# Patient Record
Sex: MALE | Race: WHITE | NOT HISPANIC OR LATINO | Employment: FULL TIME | URBAN - METROPOLITAN AREA
[De-identification: names, ages, dates, MRNs, and addresses within clinical notes are randomized per-mention and may not be internally consistent; named-entity substitution may affect disease eponyms.]

---

## 2017-02-02 ENCOUNTER — OFFICE VISIT (OUTPATIENT)
Dept: RHEUMATOLOGY | Facility: CLINIC | Age: 62
End: 2017-02-02
Attending: INTERNAL MEDICINE
Payer: COMMERCIAL

## 2017-02-02 VITALS
SYSTOLIC BLOOD PRESSURE: 140 MMHG | BODY MASS INDEX: 23.18 KG/M2 | WEIGHT: 161.9 LBS | TEMPERATURE: 97.5 F | HEART RATE: 75 BPM | DIASTOLIC BLOOD PRESSURE: 90 MMHG | OXYGEN SATURATION: 100 % | HEIGHT: 70 IN

## 2017-02-02 DIAGNOSIS — L40.50 PSORIATIC ARTHRITIS (H): Primary | ICD-10-CM

## 2017-02-02 PROCEDURE — 99212 OFFICE O/P EST SF 10 MIN: CPT | Mod: ZF

## 2017-02-02 ASSESSMENT — PAIN SCALES - GENERAL: PAINLEVEL: NO PAIN (0)

## 2017-02-02 NOTE — NURSING NOTE
Chief Complaint   Patient presents with     RECHECK     Psoriatic Arthritis   Pt roomed, vitals, meds, and allergies reviewed with pt. Pt ready for provider.  Nicholas Moyer, CMA

## 2017-02-02 NOTE — Clinical Note
"2/2/2017      RE: Prince Pereira  [address]  [facility] [address]       Rheumatology Visit     Prince Pereira MRN# [id number]   YOB: 1955 Age: 61 year old     Date of Visit: February 2, 2017  Primary care provider: Brendan Ndiaye          Assessment and Plan:   1. 60 yo male with 16+ year hx of Psoriatic Arthritis, well controlled on Enbrel. He did notice mild return of symptoms after about 7-8 weeks off drug in 2015 due to inguinal surgery complication. He is close to a clinical remission taking it less frequently often every 10 days.  2. Atrial Fibrillation, post ablation  3. Hx of Herpes Zoster, resolved.  4. Prior R temporal HA/facial pain, with negative evaluation including TA bx. This is resolved.  PLAN: discussed in detail with the patient  1. See lab orders, medication orders and follow-up plans for this encounter.   2. Additional treatment plan consists of continue Enbrel, renewed for one year  3. We discussed potential side effects   4. RTC: in 1 year(s)   5. Letter: no - he is going to see his PCP this spring  Tai Sanon MD            History of Present Illness:    60 yo  male with a past medical history of psoriatic arthritis and atrial fibrillation. He is here today for followup. I saw him for the first time in June 2012 and last in February 2016. EPIC Reviewed.  HISTORY CARRIED FORWARD:  To review, he was diagnosed with psoriatic arthritis 14+ years ago, and it has been well controlled with Enbrel  He manages to stretch his injections to once every 8-10 days, and he notes that the development of canker sores are reproducibly an indication to take his medication. He has not noticed any inflammation or irritation at the injection site. He is not experiencing any active inflammation or pain. The patient states that his range of motion is \"almost back to normal,\" with his continued use of Enbrel. He has some intermittent shoulder pain on " R that is rotator cuff related, exacerbated by tennis.  Additionally, the patient has a past medical history of atrial fibrillation since 1988, that required recent cardioversion in February; he had a second episode days later that resolved with Flecanide. He was given flecainide to use if his symptoms return, but has not needed to use the medication to date. The patient currently takes metoprolol XL and aspirin for his atrial fibrillation. He also occasionally experiences reflux, and this has been managed with omeprazole once a day.   Overall, the patient has been quite pleased with his medication, and plans to continue using Enbrel to manage his psoriatic arthritis. He has had only a small patch of psoriasis on L shin now resolved almost with Triamcinolone.  He had a bout of herpes zoster in February 2013 that resolved. He had Valtrex and has no post herpetic pain. We discussed in the past the lack of consensus about risk vs. Benefit of Zostavax in patients on anti TNF therapy. He had no questions or concerns.   He saw Nina Smith late 2014 and then Dr. Pandey emergently in late December for question of possible GCA. CRP and ESR were normal. He did have a TA bx by Dr. Ramon Lagunas which was negative. His symptoms  gradually abated and were absent a year ago.  He had a hernia repair in 2015 with pre op physical by Dr. Ndiaye, and interval followup by Dr. Hawthorne regarding prior stable pulmonary nodules and does not require followup.  Unfortunately he had a post op hematoma after surgery that opened and had to heal by secondary intention.  He was off Enbrel for about 8 weeks due to this. He did note right at the end of that slight return of hand stiffness and pain and slightly more psoriasis. This came back under excellent control after resuming and he is now close to asymptomatic taking the Enbrel on average every 10-14 days.  INTERVAL HISTORY:      Happily he has had an uneventful year.  No interval health  problems. No infections.     He has remained on long term Enbrel >14 years without problems. He often stretches it out to 10 days or so but after that will note some joint aching and mouth soreness.  No injection issues.    His mechanical foot issues are stable.    No recurrent of A Fib.     He is happy to continue on Enbrel.        Review of Systems:   Review Of Systems  Skin: see HPI  Eyes: negative  Ears/Nose/Throat: negative  Respiratory: No shortness of breath, dyspnea on exertion, cough, or hemoptysis  Cardiovascular: negative  Gastrointestinal: negative  Genitourinary: negative  Musculoskeletal: see HPI  Neurologic: negative  Psychiatric: negative  Hematologic/Lymphatic/Immunologic: negative  Endocrine: negative          Past Medical History:     Past Medical History   Diagnosis Date     psoriatic arthritis      Psoriasis      Atrial fibrillation (H)      paroxysmal with ablation 2014     Family history of prostate cancer      father  age 60     Pulmonary nodules 2014     multiple noncalcified up to 6 mm needs f/u     Herpes zoster 3/26/2013     Almendarez's neuroma of right foot      Arrhythmia        Patient Active Problem List    Diagnosis Date Noted     Herpes zoster      Priority: Medium     S/P ablation of atrial fibrillation 2014     Priority: Medium     Pulmonary nodules 2014     Priority: Medium     multiple noncalcified up to 6 mm needs f/u       Psoriatic arthritis (H) 2012     Priority: Medium     Atrial fibrillation (H) 1987     Priority: Medium             Past Surgical History:     Past Surgical History   Procedure Laterality Date     Anesthesia cardioversion  10/6/2011     Procedure:ANESTHESIA CARDIOVERSION; CARDIOVERSION; Surgeon:GENERIC ANESTHESIA PROVIDER; [facility]     Ent surgery       tonsillectomy     Anesthesia cardioversion  2/10/2012     Procedure:ANESTHESIA CARDIOVERSION; CARDIOVERSION; Surgeon:GENERIC ANESTHESIA PROVIDER; [facility]      Anesthesia cardioversion  2013     Procedure: ANESTHESIA CARDIOVERSION;  Cardioversion;  Surgeon: Provider, Generic Anesthesia;  [facility]     Anesthesia cardioversion  2013     Procedure: ANESTHESIA CARDIOVERSION;  Cardioversion;  Surgeon: Provider, Generic Anesthesia;  [facility]     Colonoscopy       Left atrial wide area circumferential radiofrequency ablation  2014       for atrial fib     Cataract iol, rt/lt Left 2015     Phacoemulsification clear cornea with standard intraocular lens implant Right 2015     Procedure: PHACOEMULSIFICATION CLEAR CORNEA WITH STANDARD INTRAOCULAR LENS IMPLANT;  Surgeon: Petr Moyer MD;  [facility]     Herniorrhaphy inguinal Right 7/15/2015     Procedure: HERNIORRHAPHY INGUINAL;  Surgeon: Bk Watts MD;  [facility]             Social History:     Social History   Substance Use Topics     Smoking status: Former Smoker -- 1 years     Types: Cigarettes     Smokeless tobacco: Never Used      Comment: 5-10 cips per day smoke only for a year     Alcohol Use: 5.0 oz/week     10 Standard drinks or equivalent per week      Comment: occasional             Family History:     Family History   Problem Relation Age of Onset     C.A.D. Maternal Grandfather       age 63     Hypertension Maternal Grandfather      Hypertension Mother      Prostate Cancer Father       age 60 agressive prostate ca     Glaucoma No family hx of      Macular Degeneration No family hx of      Alzheimer Disease Mother      Alzheimer Disease Maternal Grandmother             Allergies:     Allergies   Allergen Reactions     No Clinical Screening - See Comments Rash     Cardioversion pads cause rash.             Medications:     Current Outpatient Prescriptions   Medication Sig Dispense Refill     etanercept (ENBREL) 50 MG/ML SOSY prefilled syringe Inject 0.98 mLs (50 mg) Subcutaneous once a week Prefilled syringeHold for signs of infection, and  "then seek medical attention. 4 Syringe 11            Physical Exam:   /90 mmHg  Pulse 75  Temp(Src) 97.5  F (36.4  C) (Oral)  Ht 1.778 m (5' 10\")  Wt 73.437 kg (161 lb 14.4 oz)  BMI 23.23 kg/m2  SpO2 100%  Constitutional: WD-WN-WG cooperative   Eyes: nl conjunctiva, sclera   ENT: nl external ears, nose, throat   No mucous membrane lesions, normal saliva pool   Neck: no mass or thyroid enlargement    Lymph: no cervical, supraclavicular, inguinal or epitrochlear nodes   MS: All TMJ, neck, shoulder, elbow, wrist, MCP/PIP/DIP, spine, hip, knee, ankle, and foot MTP/IP joints were examined and found normal.. No active synovitis or deformity. Full ROM. Normal  strength   Skin: no nail pitting, alopecia, rash, nodules. He has a few patches of psoriasis on R shin  Neuro: nl cranial nerves, strength   Psych: nl affect.       Data:     Lab Results   Component Value Date    WBC 4.5 07/13/2015    WBC 6.4 11/18/2014    WBC 5.3 08/01/2014    HGB 13.5 07/13/2015    HGB 13.8 11/18/2014    HGB 13.2* 08/01/2014    HCT 39.2* 07/13/2015    HCT 40.2 11/18/2014    HCT 40.4 08/01/2014    MCV 86 07/13/2015    MCV 87 11/18/2014    MCV 88 08/01/2014     07/13/2015     11/18/2014     08/01/2014     Lab Results   Component Value Date    BUN 14 07/13/2015    BUN 12 08/01/2014    BUN 14 02/13/2014     No components found for: SEDRATE  Lab Results   Component Value Date    TSH 2.35 10/06/2011     Lab Results   Component Value Date    AST 26 08/01/2014    AST 26 11/06/2013    AST 24 04/11/2012    ALT 31 08/01/2014    ALT 30 11/06/2013    ALT 18 04/11/2012    ALKPHOS 77 08/01/2014    ALKPHOS 62 11/06/2013    ALKPHOS 59 04/11/2012     Reviewed Rheumatology lab flowsheet    Tai Sanon MD    "

## 2017-02-02 NOTE — PROGRESS NOTES
"Rheumatology Visit     Prince Pereira MRN# [id number]   YOB: 1955 Age: 61 year old     Date of Visit: February 2, 2017  Primary care provider: Brendan Ndiaye          Assessment and Plan:   1. 60 yo male with 16+ year hx of Psoriatic Arthritis, well controlled on Enbrel. He did notice mild return of symptoms after about 7-8 weeks off drug in 2015 due to inguinal surgery complication. He is close to a clinical remission taking it less frequently often every 10 days.  2. Atrial Fibrillation, post ablation  3. Hx of Herpes Zoster, resolved.  4. Prior R temporal HA/facial pain, with negative evaluation including TA bx. This is resolved.  PLAN: discussed in detail with the patient  1. See lab orders, medication orders and follow-up plans for this encounter.   2. Additional treatment plan consists of continue Enbrel, renewed for one year  3. We discussed potential side effects   4. RTC: in 1 year(s)   5. Letter: no - he is going to see his PCP this spring  Tai Sanon MD            History of Present Illness:    60 yo  male with a past medical history of psoriatic arthritis and atrial fibrillation. He is here today for followup. I saw him for the first time in June 2012 and last in February 2016. EPIC Reviewed.  HISTORY CARRIED FORWARD:  To review, he was diagnosed with psoriatic arthritis 14+ years ago, and it has been well controlled with Enbrel  He manages to stretch his injections to once every 8-10 days, and he notes that the development of canker sores are reproducibly an indication to take his medication. He has not noticed any inflammation or irritation at the injection site. He is not experiencing any active inflammation or pain. The patient states that his range of motion is \"almost back to normal,\" with his continued use of Enbrel. He has some intermittent shoulder pain on R that is rotator cuff related, exacerbated by tennis.  Additionally, the patient has a past " medical history of atrial fibrillation since 1988, that required recent cardioversion in February; he had a second episode days later that resolved with Flecanide. He was given flecainide to use if his symptoms return, but has not needed to use the medication to date. The patient currently takes metoprolol XL and aspirin for his atrial fibrillation. He also occasionally experiences reflux, and this has been managed with omeprazole once a day.   Overall, the patient has been quite pleased with his medication, and plans to continue using Enbrel to manage his psoriatic arthritis. He has had only a small patch of psoriasis on L shin now resolved almost with Triamcinolone.  He had a bout of herpes zoster in February 2013 that resolved. He had Valtrex and has no post herpetic pain. We discussed in the past the lack of consensus about risk vs. Benefit of Zostavax in patients on anti TNF therapy. He had no questions or concerns.   He saw Nina Smith late 2014 and then Dr. Pandey emergently in late December for question of possible GCA. CRP and ESR were normal. He did have a TA bx by Dr. Ramon Lagunas which was negative. His symptoms  gradually abated and were absent a year ago.  He had a hernia repair in 2015 with pre op physical by Dr. Ndiaye, and interval followup by Dr. Hawthorne regarding prior stable pulmonary nodules and does not require followup.  Unfortunately he had a post op hematoma after surgery that opened and had to heal by secondary intention.  He was off Enbrel for about 8 weeks due to this. He did note right at the end of that slight return of hand stiffness and pain and slightly more psoriasis. This came back under excellent control after resuming and he is now close to asymptomatic taking the Enbrel on average every 10-14 days.  INTERVAL HISTORY:      Happily he has had an uneventful year.  No interval health problems. No infections.     He has remained on long term Enbrel >14 years without  problems. He often stretches it out to 10 days or so but after that will note some joint aching and mouth soreness.  No injection issues.    His mechanical foot issues are stable.    No recurrent of A Fib.     He is happy to continue on Enbrel.        Review of Systems:   Review Of Systems  Skin: see HPI  Eyes: negative  Ears/Nose/Throat: negative  Respiratory: No shortness of breath, dyspnea on exertion, cough, or hemoptysis  Cardiovascular: negative  Gastrointestinal: negative  Genitourinary: negative  Musculoskeletal: see HPI  Neurologic: negative  Psychiatric: negative  Hematologic/Lymphatic/Immunologic: negative  Endocrine: negative          Past Medical History:     Past Medical History   Diagnosis Date     psoriatic arthritis      Psoriasis      Atrial fibrillation (H)      paroxysmal with ablation 2014     Family history of prostate cancer      father  age 60     Pulmonary nodules 2014     multiple noncalcified up to 6 mm needs f/u     Herpes zoster 3/26/2013     Almendarez's neuroma of right foot      Arrhythmia        Patient Active Problem List    Diagnosis Date Noted     Herpes zoster      Priority: Medium     S/P ablation of atrial fibrillation 2014     Priority: Medium     Pulmonary nodules 2014     Priority: Medium     multiple noncalcified up to 6 mm needs f/u       Psoriatic arthritis (H) 2012     Priority: Medium     Atrial fibrillation (H) 1987     Priority: Medium             Past Surgical History:     Past Surgical History   Procedure Laterality Date     Anesthesia cardioversion  10/6/2011     Procedure:ANESTHESIA CARDIOVERSION; CARDIOVERSION; Surgeon:GENERIC ANESTHESIA PROVIDER; [facility]     Ent surgery       tonsillectomy     Anesthesia cardioversion  2/10/2012     Procedure:ANESTHESIA CARDIOVERSION; CARDIOVERSION; Surgeon:GENERIC ANESTHESIA PROVIDER; [facility]     Anesthesia cardioversion  2013     Procedure: ANESTHESIA CARDIOVERSION;  Cardioversion;   Surgeon: Provider, Generic Anesthesia;  [facility]     Anesthesia cardioversion  2013     Procedure: ANESTHESIA CARDIOVERSION;  Cardioversion;  Surgeon: Provider, Generic Anesthesia;  [facility]     Colonoscopy       Left atrial wide area circumferential radiofrequency ablation  2014       for atrial fib     Cataract iol, rt/lt Left 2015     Phacoemulsification clear cornea with standard intraocular lens implant Right 2015     Procedure: PHACOEMULSIFICATION CLEAR CORNEA WITH STANDARD INTRAOCULAR LENS IMPLANT;  Surgeon: Petr Moyer MD;  [facility]     Herniorrhaphy inguinal Right 7/15/2015     Procedure: HERNIORRHAPHY INGUINAL;  Surgeon: Bk Watts MD;  [facility]             Social History:     Social History   Substance Use Topics     Smoking status: Former Smoker -- 1 years     Types: Cigarettes     Smokeless tobacco: Never Used      Comment: 5-10 cips per day smoke only for a year     Alcohol Use: 5.0 oz/week     10 Standard drinks or equivalent per week      Comment: occasional             Family History:     Family History   Problem Relation Age of Onset     C.A.D. Maternal Grandfather       age 63     Hypertension Maternal Grandfather      Hypertension Mother      Prostate Cancer Father       age 60 agressive prostate ca     Glaucoma No family hx of      Macular Degeneration No family hx of      Alzheimer Disease Mother      Alzheimer Disease Maternal Grandmother             Allergies:     Allergies   Allergen Reactions     No Clinical Screening - See Comments Rash     Cardioversion pads cause rash.             Medications:     Current Outpatient Prescriptions   Medication Sig Dispense Refill     etanercept (ENBREL) 50 MG/ML SOSY prefilled syringe Inject 0.98 mLs (50 mg) Subcutaneous once a week Prefilled syringeHold for signs of infection, and then seek medical attention. 4 Syringe 11            Physical Exam:   /90 mmHg   "Pulse 75  Temp(Src) 97.5  F (36.4  C) (Oral)  Ht 1.778 m (5' 10\")  Wt 73.437 kg (161 lb 14.4 oz)  BMI 23.23 kg/m2  SpO2 100%  Constitutional: WD-WN-WG cooperative   Eyes: nl conjunctiva, sclera   ENT: nl external ears, nose, throat   No mucous membrane lesions, normal saliva pool   Neck: no mass or thyroid enlargement    Lymph: no cervical, supraclavicular, inguinal or epitrochlear nodes   MS: All TMJ, neck, shoulder, elbow, wrist, MCP/PIP/DIP, spine, hip, knee, ankle, and foot MTP/IP joints were examined and found normal.. No active synovitis or deformity. Full ROM. Normal  strength   Skin: no nail pitting, alopecia, rash, nodules. He has a few patches of psoriasis on R shin  Neuro: nl cranial nerves, strength   Psych: nl affect.       Data:     Lab Results   Component Value Date    WBC 4.5 07/13/2015    WBC 6.4 11/18/2014    WBC 5.3 08/01/2014    HGB 13.5 07/13/2015    HGB 13.8 11/18/2014    HGB 13.2* 08/01/2014    HCT 39.2* 07/13/2015    HCT 40.2 11/18/2014    HCT 40.4 08/01/2014    MCV 86 07/13/2015    MCV 87 11/18/2014    MCV 88 08/01/2014     07/13/2015     11/18/2014     08/01/2014     Lab Results   Component Value Date    BUN 14 07/13/2015    BUN 12 08/01/2014    BUN 14 02/13/2014     No components found for: SEDRATE  Lab Results   Component Value Date    TSH 2.35 10/06/2011     Lab Results   Component Value Date    AST 26 08/01/2014    AST 26 11/06/2013    AST 24 04/11/2012    ALT 31 08/01/2014    ALT 30 11/06/2013    ALT 18 04/11/2012    ALKPHOS 77 08/01/2014    ALKPHOS 62 11/06/2013    ALKPHOS 59 04/11/2012     Reviewed Rheumatology lab flowsheet    Tai Sanon MD    "

## 2017-05-08 ENCOUNTER — RADIANT APPOINTMENT (OUTPATIENT)
Dept: GENERAL RADIOLOGY | Facility: CLINIC | Age: 62
End: 2017-05-08
Attending: PHYSICIAN ASSISTANT
Payer: COMMERCIAL

## 2017-05-08 ENCOUNTER — OFFICE VISIT (OUTPATIENT)
Dept: URGENT CARE | Facility: URGENT CARE | Age: 62
End: 2017-05-08
Payer: COMMERCIAL

## 2017-05-08 VITALS
DIASTOLIC BLOOD PRESSURE: 78 MMHG | WEIGHT: 160 LBS | TEMPERATURE: 97.7 F | BODY MASS INDEX: 22.96 KG/M2 | HEART RATE: 67 BPM | SYSTOLIC BLOOD PRESSURE: 140 MMHG | OXYGEN SATURATION: 99 %

## 2017-05-08 DIAGNOSIS — M54.6 ACUTE RIGHT-SIDED THORACIC BACK PAIN: ICD-10-CM
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
## 2017-05-08 DIAGNOSIS — M62.830 BACK MUSCLE SPASM: ICD-10-CM

## 2017-05-08 PROCEDURE — 71101 X-RAY EXAM UNILAT RIBS/CHEST: CPT | Mod: RT

## 2017-05-08 PROCEDURE — 99214 OFFICE O/P EST MOD 30 MIN: CPT | Performed by: PHYSICIAN ASSISTANT

## 2017-05-08 RX ORDER — HYDROCODONE BITARTRATE AND ACETAMINOPHEN 5; 325 MG/1; MG/1
1-2 TABLET ORAL EVERY 6 HOURS PRN
Qty: 20 TABLET | Refills: 0 | Status: SHIPPED | OUTPATIENT
Start: 2017-05-08 | End: 2018-01-31

## 2017-05-08 RX ORDER — METHOCARBAMOL 750 MG/1
750 TABLET, FILM COATED ORAL 4 TIMES DAILY PRN
Qty: 40 TABLET | Refills: 0 | Status: SHIPPED | OUTPATIENT
Start: 2017-05-08 | End: 2018-01-31

## 2017-05-08 NOTE — MR AVS SNAPSHOT
After Visit Summary   5/8/2017    Prince Pereira    MRN: [id number]           Patient Information     Date Of Birth          1955        Visit Information        Provider Department      5/8/2017 9:40 AM Greg Wing PA-C [facility]        Today's Diagnoses     Fall, initial encounter    -  1    Acute right-sided thoracic back pain        Back muscle spasm           Follow-ups after your visit        Your next 10 appointments already scheduled     Jan 31, 2018  7:00 AM CST   (Arrive by 6:45 AM)   Return Visit with Tai Sanon MD   [facility] ([facility])    [address]  3rd Floor  [place] [address] [phone]              Who to contact     If you have questions or need follow up information about today's clinic visit or your schedule please contact [facility] directly at [phone].  Normal or non-critical lab and imaging results will be communicated to you by MyChart, letter or phone within 4 business days after the clinic has received the results. If you do not hear from us within 7 days, please contact the clinic through [facility] or phone. If you have a critical or abnormal lab result, we will notify you by phone as soon as possible.  Submit refill requests through [employer] or call your pharmacy and they will forward the refill request to us. Please allow 3 business days for your refill to be completed.          Additional Information About Your Visit        MyChart Information     [employer] gives you secure access to your electronic health record. If you see a primary care provider, you can also send messages to your care team and make appointments. If you have questions, please call your primary care clinic.  If you do not have a primary care provider, please call [phone] and they will assist you.        Care EveryWhere ID     This is your Care EveryWhere  ID. This could be used by other organizations to access your [facility] medical records  [id number]        Your Vitals Were     Pulse Temperature Pulse Oximetry BMI (Body Mass Index)          67 97.7  F (36.5  C) 99% 22.96 kg/m2         Blood Pressure from Last 3 Encounters:   05/08/17 140/78   02/02/17 140/90   04/08/16 130/84    Weight from Last 3 Encounters:   05/08/17 160 lb (72.6 kg)   02/02/17 161 lb 14.4 oz (73.4 kg)   04/08/16 161 lb 4.8 oz (73.2 kg)                 Today's Medication Changes          These changes are accurate as of: 5/8/17 11:04 AM.  If you have any questions, ask your nurse or doctor.               Start taking these medicines.        Dose/Directions    HYDROcodone-acetaminophen 5-325 MG per tablet   Commonly known as:  NORCO   Used for:  Acute right-sided thoracic back pain   Started by:  Greg Wing PA-C        Dose:  1-2 tablet   Take 1-2 tablets by mouth every 6 hours as needed for moderate to severe pain   Quantity:  20 tablet   Refills:  0       methocarbamol 750 MG tablet   Commonly known as:  ROBAXIN   Used for:  Back muscle spasm   Started by:  Greg Wing PA-C        Dose:  750 mg   Take 1 tablet (750 mg) by mouth 4 times daily as needed for muscle spasms   Quantity:  40 tablet   Refills:  0            Where to get your medicines      Some of these will need a paper prescription and others can be bought over the counter.  Ask your nurse if you have questions.     Bring a paper prescription for each of these medications     HYDROcodone-acetaminophen 5-325 MG per tablet    methocarbamol 750 MG tablet                Primary Care Provider Office Phone # Fax #    Brendan Ndiaye [phone]        [facility] [address]  [facility] [address]        Thank you!     Thank you for choosing [facility]  for your care. Our goal is always to provide you with excellent care. Hearing back from our patients is one  way we can continue to improve our services. Please take a few minutes to complete the written survey that you may receive in the mail after your visit with us. Thank you!             Your Updated Medication List - Protect others around you: Learn how to safely use, store and throw away your medicines at www.disposemymeds.org.          This list is accurate as of: 5/8/17 11:04 AM.  Always use your most recent med list.                   Brand Name Dispense Instructions for use    etanercept 50 MG/ML injection    ENBREL    4 Syringe    Inject 0.98 mLs (50 mg) Subcutaneous once a week Prefilled syringeHold for signs of infection, and then seek medical attention.       HYDROcodone-acetaminophen 5-325 MG per tablet    NORCO    20 tablet    Take 1-2 tablets by mouth every 6 hours as needed for moderate to severe pain       methocarbamol 750 MG tablet    ROBAXIN    40 tablet    Take 1 tablet (750 mg) by mouth 4 times daily as needed for muscle spasms       NAPROXEN PO      Take by mouth as needed for moderate pain

## 2017-05-08 NOTE — PROGRESS NOTES
SUBJECTIVE:  Chief Complaint   Patient presents with     Back Pain     Pt states that he was fishing yesterday and fell backwards in his boat injuring his mid/lower back and right side.     Urgent Care     Prince Pereira is a 61 year old male presents with a chief complaint of right flank tenderness, pain and spasms .  The injury occurred 1 day(s) ago.   The injury happened while at home. How: working in boat.  The patient complained of moderate pain  and has had decreased ROM.  Pain exacerbated by movement.  Relieved by rest and ice.  He treated it initially with ice. This is the first time this type of injury has occurred to this patient.     Past Medical History:   Diagnosis Date     Arrhythmia      Atrial fibrillation (H)     paroxysmal with ablation 2014     Family history of prostate cancer     father  age 60     Herpes zoster 3/26/2013     Almendarez's neuroma of right foot      Psoriasis      psoriatic arthritis      Pulmonary nodules 2014    multiple noncalcified up to 6 mm needs f/u     Allergies   Allergen Reactions     No Clinical Screening - See Comments Rash     Cardioversion pads cause rash.     Social History   Substance Use Topics     Smoking status: Former Smoker     Years: 1.00     Types: Cigarettes     Quit date: 1980     Smokeless tobacco: Never Used     Alcohol use 5.0 oz/week     10 Standard drinks or equivalent per week      Comment: occasional       ROS:  CONSTITUTIONAL:NEGATIVE for fever, chills, change in weight  INTEGUMENTARY/SKIN: NEGATIVE for worrisome rashes, moles or lesions  ENT/MOUTH: NEGATIVE for ear, mouth and throat problems  RESP:NEGATIVE for significant cough or SOB  CV: NEGATIVE for chest pain, palpitations or peripheral edema  MUSCULOSKELETAL: POSITIVE  for right flank tenderness, pain, spasms  NEURO: NEGATIVE for weakness, dizziness or paresthesias    EXAM:   /78  Pulse 67  Temp 97.7  F (36.5  C)  Wt 160 lb (72.6 kg)  SpO2 99%  BMI 22.96  kg/m2  Gen: healthy,alert,no distress  Extremity: Full ROM of upper extremities bilaterally.   There is not compromise to the distal circulation.  Pulses are +2 and CRT is brisk  GENERAL APPEARANCE: healthy, alert and no distress  EXTREMITIES: peripheral pulses normal  MS:  Positive for right flank tenderness, pain, spasms  SKIN: no suspicious lesions or rashes  NEURO: Normal strength and tone, sensory exam grossly normal, mentation intact and speech normal    X-RAY was done and negative for acute changes including fracture Xray read by Greg Wing at time of visit    ASSESSMENT/PLAN:      ICD-10-CM    1. Fall, initial encounter W19.XXXA    2. Acute right-sided thoracic back pain M54.6 XR Ribs & Chest Right G/E 3 Views     HYDROcodone-acetaminophen (NORCO) 5-325 MG per tablet   3. Back muscle spasm M62.830 methocarbamol (ROBAXIN) 750 MG tablet       Ice compresses  ROM exercises  Follow up as needed

## 2017-05-08 NOTE — NURSING NOTE
"Chief Complaint   Patient presents with     Back Pain     Pt states that he was fishing yesterday and fell backwards in his boat injuring his mid/lower back and right side.     Urgent Care       Initial /78  Pulse 67  Temp 97.7  F (36.5  C)  Wt 160 lb (72.6 kg)  SpO2 99%  BMI 22.96 kg/m2 Estimated body mass index is 22.96 kg/(m^2) as calculated from the following:    Height as of 2/2/17: 5' 10\" (1.778 m).    Weight as of this encounter: 160 lb (72.6 kg).  Medication Reconciliation: complete    "

## 2017-05-11 ENCOUNTER — MEDICAL CORRESPONDENCE (OUTPATIENT)
Dept: HEALTH INFORMATION MANAGEMENT | Facility: CLINIC | Age: 62
End: 2017-05-11

## 2017-05-11 ENCOUNTER — TRANSFERRED RECORDS (OUTPATIENT)
Dept: HEALTH INFORMATION MANAGEMENT | Facility: CLINIC | Age: 62
End: 2017-05-11

## 2017-05-11 ENCOUNTER — TELEPHONE (OUTPATIENT)
Dept: CARDIOLOGY | Facility: CLINIC | Age: 62
End: 2017-05-11

## 2017-05-11 NOTE — TELEPHONE ENCOUNTER
Called and spoke to Dr Chappell. She has scheduled the patient with Dr Haynes for next week and does not need to speak with Dr Salcido.

## 2017-05-11 NOTE — TELEPHONE ENCOUNTER
Dr. Kortney Chappell would like to discuss patient care with Dr. Salcido. Please call back with pager #. [phone]

## 2017-05-12 ENCOUNTER — MEDICAL CORRESPONDENCE (OUTPATIENT)
Dept: HEALTH INFORMATION MANAGEMENT | Facility: CLINIC | Age: 62
End: 2017-05-12

## 2017-05-15 ENCOUNTER — MYC MEDICAL ADVICE (OUTPATIENT)
Dept: CARDIOLOGY | Facility: CLINIC | Age: 62
End: 2017-05-15

## 2017-05-15 ENCOUNTER — PRE VISIT (OUTPATIENT)
Dept: CARDIOLOGY | Facility: CLINIC | Age: 62
End: 2017-05-15

## 2017-05-15 ENCOUNTER — OFFICE VISIT (OUTPATIENT)
Dept: CARDIOLOGY | Facility: CLINIC | Age: 62
End: 2017-05-15
Attending: NURSE PRACTITIONER
Payer: COMMERCIAL

## 2017-05-15 ENCOUNTER — OFFICE VISIT (OUTPATIENT)
Dept: INTERNAL MEDICINE | Facility: CLINIC | Age: 62
End: 2017-05-15

## 2017-05-15 VITALS
WEIGHT: 160.6 LBS | HEART RATE: 72 BPM | OXYGEN SATURATION: 100 % | DIASTOLIC BLOOD PRESSURE: 89 MMHG | SYSTOLIC BLOOD PRESSURE: 137 MMHG | HEIGHT: 70 IN | BODY MASS INDEX: 22.99 KG/M2

## 2017-05-15 DIAGNOSIS — M54.50 ACUTE LOW BACK PAIN DUE TO TRAUMA: Primary | ICD-10-CM

## 2017-05-15 DIAGNOSIS — G89.11 ACUTE LOW BACK PAIN DUE TO TRAUMA: Primary | ICD-10-CM

## 2017-05-15 DIAGNOSIS — I48.91 ATRIAL FIBRILLATION, UNSPECIFIED TYPE (H): ICD-10-CM

## 2017-05-15 DIAGNOSIS — I48.91 ATRIAL FIBRILLATION, UNSPECIFIED TYPE (H): Primary | ICD-10-CM

## 2017-05-15 LAB — INTERPRETATION ECG - MUSE: NORMAL

## 2017-05-15 PROCEDURE — 93005 ELECTROCARDIOGRAM TRACING: CPT | Mod: ZF

## 2017-05-15 PROCEDURE — 93010 ELECTROCARDIOGRAM REPORT: CPT | Mod: ZP | Performed by: INTERNAL MEDICINE

## 2017-05-15 PROCEDURE — 99213 OFFICE O/P EST LOW 20 MIN: CPT | Mod: 25 | Performed by: NURSE PRACTITIONER

## 2017-05-15 RX ORDER — GABAPENTIN 300 MG/1
300 CAPSULE ORAL 3 TIMES DAILY
Qty: 90 CAPSULE | Refills: 3 | Status: SHIPPED | OUTPATIENT
Start: 2017-05-15 | End: 2018-01-31

## 2017-05-15 RX ORDER — METOPROLOL SUCCINATE 25 MG/1
25 TABLET, EXTENDED RELEASE ORAL DAILY
COMMUNITY
End: 2018-01-31

## 2017-05-15 RX ORDER — FLECAINIDE ACETATE 100 MG/1
100 TABLET ORAL 2 TIMES DAILY
Qty: 6 TABLET | Refills: 0 | Status: SHIPPED | OUTPATIENT
Start: 2017-05-15 | End: 2021-01-14

## 2017-05-15 RX ORDER — DABIGATRAN ETEXILATE 150 MG/1
150 CAPSULE ORAL 2 TIMES DAILY
Qty: 60 CAPSULE | Refills: 0 | Status: SHIPPED | OUTPATIENT
Start: 2017-05-15 | End: 2018-01-31

## 2017-05-15 RX ORDER — METHYLPREDNISOLONE 4 MG
TABLET, DOSE PACK ORAL
Qty: 21 TABLET | Refills: 0 | Status: SHIPPED | OUTPATIENT
Start: 2017-05-15 | End: 2018-01-31

## 2017-05-15 ASSESSMENT — PAIN SCALES - GENERAL
PAINLEVEL: EXTREME PAIN (8)
PAINLEVEL: NO PAIN (0)

## 2017-05-15 NOTE — PROGRESS NOTES
Clinical Cardiac Electrophysiology    Chief Complaint: Atrial fibrillation     HPI: Prince Pereira is a 61 year old male.  He is a psychiatrist.  His past medical history includes PVI for atrial fibrillation 2/2014, pulmonary nodules (followed by pulmonary nodule clinic), GERD, psoriasis and psoriasis arthritis.  He has had paroxsymal atrial fibrillation for the past 25 years with 10+ DCCV and a recent PVI on 2/2014 which was complicated with a 8 day stay in the hospital for femoral hematoma.  Prior to his PVI he was  taking flecainide as a pill in a pocket approach.  He was referred  from [facility] for recent episode of atrial fibrillation.      Today he presents stating he recently experienced significant back pain after falling backwards.  He has been taking methocarbamol since 5/8 and had 1 hydrocodone-acetaminophen.  Woke up one 3 days ago feeling extreme fatigue, palpitations, and nausea.  He checked his pulse and noted to be irregular; he proceeded to [facility].  His labs were checked and he was subsequently prescribed metoprolol XL 25 mg of which is he is tolerating.  He self converted in the clinic.  He states he has not had any atrial fibrillation episodes since having a PVI in 2/2014.  Prior to his recent back injury he was exercising regularly. Denies headaches, dizziness, syncope, angina, dyspnea at rest or with exertion, palpitations, orthopnea, PND, abdominal pain, pedal edema, claudication, or any new numbness/weakness, hematuria, hematochezia, and epistaxis.   In the near future he is planning on traveling to [facility] and on various family trips.    Today's EKG sinus rhythm with ventricular rate of 70 bpm.     Current Outpatient Prescriptions   Medication Sig Dispense Refill     NAPROXEN PO Take by mouth as needed for moderate pain       methocarbamol (ROBAXIN) 750 MG tablet Take 1 tablet (750 mg) by mouth 4 times daily as needed for muscle spasms 40 tablet 0      HYDROcodone-acetaminophen (NORCO) 5-325 MG per tablet Take 1-2 tablets by mouth every 6 hours as needed for moderate to severe pain 20 tablet 0     etanercept (ENBREL) 50 MG/ML injection Inject 0.98 mLs (50 mg) Subcutaneous once a week Prefilled syringeHold for signs of infection, and then seek medical attention. 4 Syringe 11       Past Medical History:   Diagnosis Date     Arrhythmia      Atrial fibrillation (H)     paroxysmal with ablation 2014     Family history of prostate cancer     father  age 60     Herpes zoster 3/26/2013     Almendarez's neuroma of right foot      Psoriasis      psoriatic arthritis      Pulmonary nodules 2014    multiple noncalcified up to 6 mm needs f/u       Past Surgical History:   Procedure Laterality Date     ANESTHESIA CARDIOVERSION  10/6/2011    Procedure:ANESTHESIA CARDIOVERSION; CARDIOVERSION; Surgeon:GENERIC ANESTHESIA PROVIDER; Location:[facility] OR     ANESTHESIA CARDIOVERSION  2/10/2012    Procedure:ANESTHESIA CARDIOVERSION; CARDIOVERSION; Surgeon:GENERIC ANESTHESIA PROVIDER; Location[facility]     ANESTHESIA CARDIOVERSION  2013    Procedure: ANESTHESIA CARDIOVERSION;  Cardioversion;  Surgeon: Provider, Generic Anesthesia;  Location: [facility] OR     ANESTHESIA CARDIOVERSION  2013    Procedure: ANESTHESIA CARDIOVERSION;  Cardioversion;  Surgeon: Provider, Generic Anesthesia;  Location: [facility] OR     CATARACT IOL, RT/LT Left 2015     COLONOSCOPY       ENT SURGERY      tonsillectomy     HERNIORRHAPHY INGUINAL Right 7/15/2015    Procedure: HERNIORRHAPHY INGUINAL;  Surgeon: Bk Watts MD;  Location: [facility] OR     Left Atrial Wide Area Circumferential radiofrequency ablation  2014      for atrial fib     PHACOEMULSIFICATION CLEAR CORNEA WITH STANDARD INTRAOCULAR LENS IMPLANT Right 2015    Procedure: PHACOEMULSIFICATION CLEAR CORNEA WITH STANDARD INTRAOCULAR LENS IMPLANT;  Surgeon: Petr Moyer MD;  Location: [facility]       Family History   Problem Relation  "Age of Onset     C.A.D. Maternal Grandfather       age 63     Hypertension Maternal Grandfather      Hypertension Mother      Alzheimer Disease Mother      Prostate Cancer Father       age 60 agressive prostate ca     Alzheimer Disease Maternal Grandmother      Glaucoma No family hx of      Macular Degeneration No family hx of        Social History   Substance Use Topics     Smoking status: Former Smoker     Years: 1.00     Types: Cigarettes     Quit date: 1980     Smokeless tobacco: Never Used     Alcohol use 5.0 oz/week     10 Standard drinks or equivalent per week      Comment: occasional       Allergies   Allergen Reactions     No Clinical Screening - See Comments Rash     Cardioversion pads cause rash.         ROS:   CONSTITUTIONAL:No report of fever, chills,  or change in weight  RESPIRATORY: No cough, wheezing, SOB, or hemoptysis  CARDIOVASCULAR: see HPI  MUSCULO-SKELETAL: Positive for back pain. No joint pain/swelling, no muslce pain  NEURO: No paresthesias, syncope, pre-syncope, light headness, dizziness or vertigo  ENDOCRINE: No temperature intolerance, no skin/hair changes  PSYCHIATRIC: No change in mood, feeling down/anxious, no change in sleep or appetite  GI: no melena or hematochezia, no change in bowel habits  : no hematuria or dysurea, no hesitancy, dribbling or incontinence  HEME: no easy bruising or bleeding, no history of anemia, no history of blood clots  SKIN: no rashes or sores, no unusual itching        Physical Examination:  Vitals: /89 (BP Location: Right arm, Patient Position: Chair, Cuff Size: Adult Regular)  Pulse 72  Ht 1.778 m (5' 10\")  Wt 72.8 kg (160 lb 9.6 oz)  SpO2 100%  BMI 23.04 kg/m2  BMI= There is no height or weight on file to calculate BMI.    GENERAL APPEARANCE: healthy, alert and no distress  HEENT: no icterus, no xanthelasmas, normal pupil size and reaction, normal palate, mucosa moist, no cyanosis.  NECK: no adenopathy, no asymmetry, masses, or " scars, carotid upstrokes are normal bilaterally,  JVP is not visible  CHEST: lungs clear to auscultation - no rales, rhonchi or wheezes, no use of accessory muscles, no retractions, respirations are unlabored, normal respiratory rate, no kyphosis, no scoliosis  CARDIOVASCULAR: regular rhythm, normal S1 with physiologic split S2, no S3 or S4 and no murmur, click or rub, precordium quiet with normal PMI.  ABDOMEN: soft, non tender, without hepatosplenomegaly, no masses palpable, bowel sounds normal,  no abdominal bruits  EXTREMITIES: no clubbing, cyanosis or edema  NEURO: alert and oriented to person/place/time, normal speech, gait and affect  VASC: Radial and posterior tibialis pulses are normal in volumes and symmetric bilaterally.  SKIN: no ecchymoses, no rashes      Laboratory:  Reviewed labs from [facility].  JESSIE on BMP and CBC.  No TSH was obtained    Assessment and recommendations:    Atrial fibrillation    We discussed in detail with the patient management/treatment options for Kala includin. Stroke Prophylaxis:  CHADSVASC=0  0, corresponding to a 0% annual stroke / systemic emolism event rate. He does not need long term oral anticoagulation.    However, if he has an episode of atrial fibrillation and desires cardioversion; he would need to take a NOAC 2 hours prior to LUC with DCCV and then 30 days of NOAC after the DCCV.  A prescription for dabigatran was sent to his pharmacy for this reason.  Will send the instructions via my chart.   2. Rate Control: Currently prescribed metoprolol XL 25 mg.    3. Rhythm Control: Cardioversion, Antiarrhythmics and/or ablation are options for rhythm control.   S/P PVI ablation on 2014 with no episodes of reoccurrence.  Sent a prescription for Flecainide pill in the pocket.  Discussed continuing on beta blocker during this time frame.        4. Risk Factor Management: Statin, BP control, and TREY evaluation as needed.        I appreciate the chance to help with   Augie stephenson. Please let me know if you have any questions or concerns.      ARASH Aldana, CNP

## 2017-05-15 NOTE — MR AVS SNAPSHOT
After Visit Summary   5/15/2017    Prince Pereira    MRN: [id number]           Patient Information     Date Of Birth          1955        Visit Information        Provider Department      5/15/2017 7:00 AM Tara Penn APRN CNP [facility]        Today's Diagnoses     Atrial fibrillation, unspecified type (H)          Care Instructions    Cardiology Provider you saw in clinic today: ARASH Aldana, CNP    Medication Changes:  Continue with Metoprolol XL 25 mg                              Will send flecainide order for pill in the pocket.                                           I may send an order for anticoagulation for future cardioversion's.  Will leave a message on voicemail and my chart on directions    Labs/Tests needed:  None      Follow-up Visit:      Further Instructions:      You will receive all normal lab and testing results via [employer] or mail if not reviewed in clinic today. Please contact our office if you need assistance with setting up MyChart.    If you need a medication refill please contact your pharmacy. Please allow 3 business days for your refill to be completed.     As always, thank you for trusting us with your health care needs!    If you have any questions regarding your visit please contact your care team:   Cardiology  Telephone Number    EP RN  Electrophysiology Nurse Coordinator [phone]     Call for EP procedure scheduling concerns  MYARA Cr  [phone]           Device Clinic (Pacemakers, ICDs, Loop)   RN's : Arabella Red Connie, Dawn During business hours: [phone]    After business hours:   [phone]- select option 4 and ask for job code [id number].                Follow-ups after your visit        Your next 10 appointments already scheduled     May 15, 2017 12:00 PM CDT   (Arrive by 11:45 AM)   Return Visit with Treasure Houser MD   [facility] ([facility]    [address]  4th Floor  [place] [address] [phone]            Jan 31, 2018  7:00 AM CST   (Arrive by 6:45 AM)   Return Visit with Tai Sanon MD   [facility] ([facility])    [address]  [address] [facility] [address] [phone]              Who to contact     If you have questions or need follow up information about today's clinic visit or your schedule please contact [facility] directly at [phone].  Normal or non-critical lab and imaging results will be communicated to you by [employer], letter or phone within 4 business days after the clinic has received the results. If you do not hear from us within 7 days, please contact the clinic through Senior[employer] or phone. If you have a critical or abnormal lab result, we will notify you by phone as soon as possible.  Submit refill requests through [employer] Moment or call your pharmacy and they will forward the refill request to us. Please allow 3 business days for your refill to be completed.          Additional Information About Your Visit        MyChart Information     The Moment gives you secure access to your electronic health record. If you see a primary care provider, you can also send messages to your care team and make appointments. If you have questions, please call your primary care clinic.  If you do not have a primary care provider, please call [phone] and they will assist you.        Care EveryWhere ID     This is your Care EveryWhere ID. This could be used by other organizations to access your [place] medical records  [id number]        Your Vitals Were     Pulse Height Pulse Oximetry BMI (Body Mass Index)          72 1.778 m (5' 10\") 100% 23.04 kg/m2         Blood Pressure from Last 3 Encounters:   05/15/17 137/89   05/08/17 140/78   02/02/17 140/90    Weight from Last 3 Encounters:   05/15/17 72.8 kg (160 lb 9.6 oz)   05/08/17 72.6 kg (160 lb) "   02/02/17 73.4 kg (161 lb 14.4 oz)              We Performed the Following     EKG 12-lead, tracing only (Future)        Primary Care Provider Office Phone # Fax #    Brendan Ndiaye [phone]        [facility] [address]  [place] [address]        Thank you!     Thank you for choosing [facility]  for your care. Our goal is always to provide you with excellent care. Hearing back from our patients is one way we can continue to improve our services. Please take a few minutes to complete the written survey that you may receive in the mail after your visit with us. Thank you!             Your Updated Medication List - Protect others around you: Learn how to safely use, store and throw away your medicines at www.disposemymeds.org.          This list is accurate as of: 5/15/17  7:45 AM.  Always use your most recent med list.                   Brand Name Dispense Instructions for use    diclofenac 1 % Gel topical gel    VOLTAREN     Place 2-4 g onto the skin 4 times daily       etanercept 50 MG/ML injection    ENBREL    4 Syringe    Inject 0.98 mLs (50 mg) Subcutaneous once a week Prefilled syringeHold for signs of infection, and then seek medical attention.       HYDROcodone-acetaminophen 5-325 MG per tablet    NORCO    20 tablet    Take 1-2 tablets by mouth every 6 hours as needed for moderate to severe pain       methocarbamol 750 MG tablet    ROBAXIN    40 tablet    Take 1 tablet (750 mg) by mouth 4 times daily as needed for muscle spasms       metoprolol 25 MG 24 hr tablet    TOPROL-XL     Take 25 mg by mouth daily       NAPROXEN PO      Take by mouth as needed for moderate pain

## 2017-05-15 NOTE — PATIENT INSTRUCTIONS
Cardiology Provider you saw in clinic today: ARASH Aldana, CNP    Medication Changes:  Continue with Metoprolol XL 25 mg                              Will send flecainide order for pill in the pocket.                                           I may send an order for anticoagulation for future cardioversion's.  Will leave a message on voicemail and my chart on directions    Labs/Tests needed:  None      Follow-up Visit:      Further Instructions:      You will receive all normal lab and testing results via [employer] or mail if not reviewed in clinic today. Please contact our office if you need assistance with setting up [employer].    If you need a medication refill please contact your pharmacy. Please allow 3 business days for your refill to be completed.     As always, thank you for trusting us with your health care needs!    If you have any questions regarding your visit please contact your care team:   Cardiology  Telephone Number    EP RN  Electrophysiology Nurse Coordinator [phone]     Call for EP procedure scheduling concerns  MAYRA Cr  [phone]           Device Clinic (Pacemakers, ICDs, Loop)   RN's : Arabella Red Connie, Dawn During business hours: [phone]    After business hours:   [phone]- select option 4 and ask for job code [phone].

## 2017-05-15 NOTE — NURSING NOTE
Chief Complaint   Patient presents with     New Patient     Recurrent Atrial Fibrillation-EKG

## 2017-05-15 NOTE — NURSING NOTE
Chief Complaint   Patient presents with     Back Pain     Patient here for back pain after a fall one week ago     Sherine Gonzalez LPN at 11:53 AM on 5/15/2017.

## 2017-05-15 NOTE — MR AVS SNAPSHOT
After Visit Summary   5/15/2017    Prince Pereira    MRN: [id number]           Patient Information     Date Of Birth          1955        Visit Information        Provider Department      5/15/2017 12:00 PM Treasure Sim MD [facility]        Today's Diagnoses     Acute low back pain due to trauma    -  1       Follow-ups after your visit        Your next 10 appointments already scheduled     Jan 31, 2018  7:00 AM CST   (Arrive by 6:45 AM)   Return Visit with Tai Sanon MD   [facility] ([facility])    [address] [place] [address] [phone]              Who to contact     Please call your clinic at [phone] to:    Ask questions about your health    Make or cancel appointments    Discuss your medicines    Learn about your test results    Speak to your doctor   If you have compliments or concerns about an experience at your clinic, or if you wish to file a complaint, please contact [facility] Patient Relations at [phone] or email us at [email or web address]         Additional Information About Your Visit        MyChart Information     [employer] gives you secure access to your electronic health record. If you see a primary care provider, you can also send messages to your care team and make appointments. If you have questions, please call your primary care clinic.  If you do not have a primary care provider, please call [phone] and they will assist you.      [employer] is an electronic gateway that provides easy, online access to your medical records. With [employer], you can request a clinic appointment, read your test results, renew a prescription or communicate with your care team.     To access your existing account, please contact your [facility] or call [phone] for assistance.        Care EveryWhere  ID     This is your Care EveryWhere ID. This could be used by other organizations to access your [place] medical records  [id number]         Blood Pressure from Last 3 Encounters:   05/15/17 137/89   05/08/17 140/78   02/02/17 140/90    Weight from Last 3 Encounters:   05/15/17 72.8 kg (160 lb 9.6 oz)   05/08/17 72.6 kg (160 lb)   02/02/17 73.4 kg (161 lb 14.4 oz)              Today, you had the following     No orders found for display         Today's Medication Changes          These changes are accurate as of: 5/15/17 12:28 PM.  If you have any questions, ask your nurse or doctor.               Start taking these medicines.        Dose/Directions    dabigatran ANTICOAGULANT 150 MG Caps capsule   Commonly known as:  PRADAXA ANTICOAGULANT   Used for:  Atrial fibrillation, unspecified type (H)   Started by:  Tara Penn APRN CNP        Dose:  150 mg   Take 1 capsule (150 mg) by mouth 2 times daily Store in original package; use within 120 days. Do not crush or open capsule.   Quantity:  60 capsule   Refills:  0       flecainide 100 MG tablet   Commonly known as:  TAMBOCOR   Used for:  Atrial fibrillation, unspecified type (H)   Started by:  Tara Penn APRN CNP        Dose:  100 mg   Take 1 tablet (100 mg) by mouth 2 times daily   Quantity:  6 tablet   Refills:  0       gabapentin 300 MG capsule   Commonly known as:  NEURONTIN   Used for:  Acute low back pain due to trauma   Started by:  Treasure Sim MD        Dose:  300 mg   Take 1 capsule (300 mg) by mouth 3 times daily   Quantity:  90 capsule   Refills:  3       methylPREDNISolone 4 MG tablet   Commonly known as:  MEDROL DOSEPAK   Used for:  Acute low back pain due to trauma   Started by:  Treasure Sim MD        Follow package instructions   Quantity:  21 tablet   Refills:  0            Where to get your medicines      These medications were sent to [facility] [place], [place] -  [address]  [address] [address]     Phone:  [phone]     dabigatran ANTICOAGULANT 150 MG Caps capsule    flecainide 100 MG tablet    gabapentin 300 MG capsule    methylPREDNISolone 4 MG tablet                Primary Care Provider Office Phone # Fax #    Brendan Ndiaye [phone]        [facility] [address] [facility] [address]  [facility] [address]        Thank you!     Thank you for choosing [facility]  for your care. Our goal is always to provide you with excellent care. Hearing back from our patients is one way we can continue to improve our services. Please take a few minutes to complete the written survey that you may receive in the mail after your visit with us. Thank you!             Your Updated Medication List - Protect others around you: Learn how to safely use, store and throw away your medicines at www.disposemymeds.org.          This list is accurate as of: 5/15/17 12:28 PM.  Always use your most recent med list.                   Brand Name Dispense Instructions for use    dabigatran ANTICOAGULANT 150 MG Caps capsule    PRADAXA ANTICOAGULANT    60 capsule    Take 1 capsule (150 mg) by mouth 2 times daily Store in original package; use within 120 days. Do not crush or open capsule.       diclofenac 1 % Gel topical gel    VOLTAREN     Place 2-4 g onto the skin 4 times daily       etanercept 50 MG/ML injection    ENBREL    4 Syringe    Inject 0.98 mLs (50 mg) Subcutaneous once a week Prefilled syringeHold for signs of infection, and then seek medical attention.       flecainide 100 MG tablet    TAMBOCOR    6 tablet    Take 1 tablet (100 mg) by mouth 2 times daily       gabapentin 300 MG capsule    NEURONTIN    90 capsule    Take 1 capsule (300 mg) by mouth 3 times daily       HYDROcodone-acetaminophen 5-325 MG per tablet    NORCO    20 tablet    Take 1-2 tablets by mouth every 6 hours as needed for moderate to severe pain        methocarbamol 750 MG tablet    ROBAXIN    40 tablet    Take 1 tablet (750 mg) by mouth 4 times daily as needed for muscle spasms       methylPREDNISolone 4 MG tablet    MEDROL DOSEPAK    21 tablet    Follow package instructions       metoprolol 25 MG 24 hr tablet    TOPROL-XL     Take 25 mg by mouth daily       NAPROXEN PO      Take by mouth as needed for moderate pain

## 2017-05-15 NOTE — LETTER
5/15/2017      RE: Prince Pereira  [address]  [facility] [address]       Dear Colleague,    Thank you for the opportunity to participate in the care of your patient, Prince Pereira, at the [facility]. Please see a copy of my visit note below.        Clinical Cardiac Electrophysiology    Chief Complaint: Atrial fibrillation     HPI: Prince Pereira is a 61 year old male.  He is a psychiatrist.  His past medical history includes PVI for atrial fibrillation 2/2014, pulmonary nodules (followed by pulmonary nodule clinic), GERD, psoriasis and psoriasis arthritis.  He has had paroxsymal atrial fibrillation for the past 25 years with 10+ DCCV and a recent PVI on 2/2014 which was complicated with a 8 day stay in the hospital for femoral hematoma.  Prior to his PVI he was  taking flecainide as a pill in a pocket approach.  He was referred  from [facility] for recent episode of atrial fibrillation.      Today he presents stating he recently experienced significant back pain after falling backwards.  He has been taking methocarbamol since 5/8 and had 1 hydrocodone-acetaminophen.  Woke up one 3 days ago feeling extreme fatigue, palpitations, and nausea.  He checked his pulse and noted to be irregular; he proceeded to [facility].  His labs were checked and he was subsequently prescribed metoprolol XL 25 mg of which is he is tolerating.  He self converted in the clinic.  He states he has not had any atrial fibrillation episodes since having a PVI in 2/2014.  Prior to his recent back injury he was exercising regularly. Denies headaches, dizziness, syncope, angina, dyspnea at rest or with exertion, palpitations, orthopnea, PND, abdominal pain, pedal edema, claudication, or any new numbness/weakness, hematuria, hematochezia, and epistaxis.   In the near future he is planning on traveling to remote [place] and on various family  trips.    Today's EKG sinus rhythm with ventricular rate of 70 bpm.     Current Outpatient Prescriptions   Medication Sig Dispense Refill     NAPROXEN PO Take by mouth as needed for moderate pain       methocarbamol (ROBAXIN) 750 MG tablet Take 1 tablet (750 mg) by mouth 4 times daily as needed for muscle spasms 40 tablet 0     HYDROcodone-acetaminophen (NORCO) 5-325 MG per tablet Take 1-2 tablets by mouth every 6 hours as needed for moderate to severe pain 20 tablet 0     etanercept (ENBREL) 50 MG/ML injection Inject 0.98 mLs (50 mg) Subcutaneous once a week Prefilled syringeHold for signs of infection, and then seek medical attention. 4 Syringe 11       Past Medical History:   Diagnosis Date     Arrhythmia      Atrial fibrillation (H)     paroxysmal with ablation 2014     Family history of prostate cancer     father  age 60     Herpes zoster 3/26/2013     Almendarez's neuroma of right foot      Psoriasis      psoriatic arthritis      Pulmonary nodules 2014    multiple noncalcified up to 6 mm needs f/u       Past Surgical History:   Procedure Laterality Date     ANESTHESIA CARDIOVERSION  10/6/2011    Procedure:ANESTHESIA CARDIOVERSION; CARDIOVERSION; Surgeon:GENERIC ANESTHESIA PROVIDER; Location:[facility] OR     ANESTHESIA CARDIOVERSION  2/10/2012    Procedure:ANESTHESIA CARDIOVERSION; CARDIOVERSION; Surgeon:GENERIC ANESTHESIA PROVIDER; Location[facility] OR     ANESTHESIA CARDIOVERSION  2013    Procedure: ANESTHESIA CARDIOVERSION;  Cardioversion;  Surgeon: Provider, Generic Anesthesia;  Location: [facility] OR     ANESTHESIA CARDIOVERSION  2013    Procedure: ANESTHESIA CARDIOVERSION;  Cardioversion;  Surgeon: Provider, Generic Anesthesia;  Location: [facility] OR     CATARACT IOL, RT/LT Left 2015     COLONOSCOPY       ENT SURGERY      tonsillectomy     HERNIORRHAPHY INGUINAL Right 7/15/2015    Procedure: HERNIORRHAPHY INGUINAL;  Surgeon: Bk Watts MD;  Location: [facility] OR     Left Atrial Wide Area  Circumferential radiofrequency ablation  2014      for atrial fib     PHACOEMULSIFICATION CLEAR CORNEA WITH STANDARD INTRAOCULAR LENS IMPLANT Right 2015    Procedure: PHACOEMULSIFICATION CLEAR CORNEA WITH STANDARD INTRAOCULAR LENS IMPLANT;  Surgeon: Petr Moyer MD;  Location: [facility]       Family History   Problem Relation Age of Onset     C.A.D. Maternal Grandfather       age 63     Hypertension Maternal Grandfather      Hypertension Mother      Alzheimer Disease Mother      Prostate Cancer Father       age 60 agressive prostate ca     Alzheimer Disease Maternal Grandmother      Glaucoma No family hx of      Macular Degeneration No family hx of        Social History   Substance Use Topics     Smoking status: Former Smoker     Years: 1.00     Types: Cigarettes     Quit date: 1980     Smokeless tobacco: Never Used     Alcohol use 5.0 oz/week     10 Standard drinks or equivalent per week      Comment: occasional       Allergies   Allergen Reactions     No Clinical Screening - See Comments Rash     Cardioversion pads cause rash.         ROS:   CONSTITUTIONAL:No report of fever, chills,  or change in weight  RESPIRATORY: No cough, wheezing, SOB, or hemoptysis  CARDIOVASCULAR: see HPI  MUSCULO-SKELETAL: Positive for back pain. No joint pain/swelling, no muslce pain  NEURO: No paresthesias, syncope, pre-syncope, light headness, dizziness or vertigo  ENDOCRINE: No temperature intolerance, no skin/hair changes  PSYCHIATRIC: No change in mood, feeling down/anxious, no change in sleep or appetite  GI: no melena or hematochezia, no change in bowel habits  : no hematuria or dysurea, no hesitancy, dribbling or incontinence  HEME: no easy bruising or bleeding, no history of anemia, no history of blood clots  SKIN: no rashes or sores, no unusual itching        Physical Examination:  Vitals: /89 (BP Location: Right arm, Patient Position: Chair, Cuff Size: Adult Regular)  Pulse 72  Ht 1.778 m  "(5' 10\")  Wt 72.8 kg (160 lb 9.6 oz)  SpO2 100%  BMI 23.04 kg/m2  BMI= There is no height or weight on file to calculate BMI.    GENERAL APPEARANCE: healthy, alert and no distress  HEENT: no icterus, no xanthelasmas, normal pupil size and reaction, normal palate, mucosa moist, no cyanosis.  NECK: no adenopathy, no asymmetry, masses, or scars, carotid upstrokes are normal bilaterally,  JVP is not visible  CHEST: lungs clear to auscultation - no rales, rhonchi or wheezes, no use of accessory muscles, no retractions, respirations are unlabored, normal respiratory rate, no kyphosis, no scoliosis  CARDIOVASCULAR: regular rhythm, normal S1 with physiologic split S2, no S3 or S4 and no murmur, click or rub, precordium quiet with normal PMI.  ABDOMEN: soft, non tender, without hepatosplenomegaly, no masses palpable, bowel sounds normal,  no abdominal bruits  EXTREMITIES: no clubbing, cyanosis or edema  NEURO: alert and oriented to person/place/time, normal speech, gait and affect  VASC: Radial and posterior tibialis pulses are normal in volumes and symmetric bilaterally.  SKIN: no ecchymoses, no rashes      Laboratory:  Reviewed labs from [facility].  [facility] on BMP and CBC.  No TSH was obtained    Assessment and recommendations:    Atrial fibrillation    We discussed in detail with the patient management/treatment options for Kala includin. Stroke Prophylaxis:  CHADSVASC=0  0, corresponding to a 0% annual stroke / systemic emolism event rate. He does not need long term oral anticoagulation.    However, if he has an episode of atrial fibrillation and desires cardioversion; he would need to take a NOAC 2 hours prior to LUC with DCCV and then 30 days of NOAC after the DCCV.  A prescription for dabigatran was sent to his pharmacy for this reason.  Will send the instructions via my chart.   2. Rate Control: Currently prescribed metoprolol XL 25 mg.    3. Rhythm Control: Cardioversion, Antiarrhythmics and/or ablation are " options for rhythm control.   S/P PVI ablation on 2/2014 with no episodes of reoccurrence.  Sent a prescription for Flecainide pill in the pocket.  Discussed continuing on beta blocker during this time frame.        4. Risk Factor Management: Statin, BP control, and TREY evaluation as needed.        I appreciate the chance to help with Mr. Augie stephenson. Please let me know if you have any questions or concerns.      ARASH Aldana, CNP

## 2017-05-16 NOTE — PROGRESS NOTES
"Prince was seen today for back pain.  He is a 61 year old male with a past medical history of Arrhythmia; Atrial fibrillation (H); Family history of prostate cancer; Herpes zoster (3/26/2013); Almendarez's neuroma of right foot; Psoriasis; psoriatic arthritis; and Pulmonary nodules (2/2014).     He fell backwards in a fishing boat about a week ago and hit the right side of his mid back/flank area on the hard surface of the middle seat.  He felt immediate pain, some mild swelling.  Over the past week, it is slowly improved, but not gone away entirely and the pain has changed to be a sharper, intermittent, spasm or \"shock\" of a pain, to him it seems neuropathic in nature because it travels down in the right hip and is associated with some numnbess tingling in that area.  He has tried Naproxen with minimal relief, also saw an urgent care who gave him a muscle relaxant which really didn't help (methocarbamol).  He is wondering if he needs further imaging such as an MRI.  He's hoping to get something for pain relief because he is leaving on a plane Friday morning.    On exam:  Gen:  Alert, NAD  MS:  Over the right flank, just below the rhomboid muscle is a resolving ecchymosis, yellow in color.  There is tenderness to palpation in this area as well.  There is no tenderness noted in the right SI, hip area, or greater trochanteric burse  Neuro:  Gait stable and symmetric, no foot drop    A/P:      Acute low back pain due to trauma.  Will start both a medrol dose dwayne for better antiinflammatory effects as well as gabapentin at hs for any neuropathic component.  Will hold off on MRI for now, explained that the reason to do this would be to evaluate for a disc bulge or something amenable to injection or surgery, and we would generally try other conservative measures first.  However, MRI is next step if symptoms do not improve.    -     methylPREDNISolone (MEDROL DOSEPAK) 4 MG tablet; Follow package instructions  -     gabapentin " (NEURONTIN) 300 MG capsule; Take 1 capsule (300 mg) by mouth 3 times daily    Mik Judge MD

## 2017-05-17 ENCOUNTER — MYC MEDICAL ADVICE (OUTPATIENT)
Dept: INTERNAL MEDICINE | Facility: CLINIC | Age: 62
End: 2017-05-17

## 2017-05-17 DIAGNOSIS — M54.50 ACUTE RIGHT-SIDED LOW BACK PAIN WITHOUT SCIATICA: Primary | ICD-10-CM

## 2017-05-17 RX ORDER — METHOCARBAMOL 750 MG/1
750 TABLET, FILM COATED ORAL 4 TIMES DAILY PRN
Qty: 40 TABLET | Refills: 0 | Status: SHIPPED | OUTPATIENT
Start: 2017-05-17 | End: 2017-05-18

## 2017-05-17 NOTE — TELEPHONE ENCOUNTER
Pt requesting refill of methocarbamol that was prescribed to him at urgent care clinic. Would also like to have MRI. Please advise. Medication pended if appropriate.    Kathy Noble RN

## 2017-05-18 RX ORDER — METHOCARBAMOL 750 MG/1
750 TABLET, FILM COATED ORAL 4 TIMES DAILY PRN
Qty: 40 TABLET | Refills: 0 | Status: SHIPPED | OUTPATIENT
Start: 2017-05-18 | End: 2018-01-31

## 2017-05-18 NOTE — TELEPHONE ENCOUNTER
Med rx sent to pharmacy. Per Dr. Judge, pt contacted her earlier today stating he is feeling better, and does not feel as though he needs an MRI. Message left on pt vm stating rx sent and to call back if needed.    Kathy Noble RN

## 2018-01-31 ENCOUNTER — OFFICE VISIT (OUTPATIENT)
Dept: RHEUMATOLOGY | Facility: CLINIC | Age: 63
End: 2018-01-31
Attending: INTERNAL MEDICINE
Payer: COMMERCIAL

## 2018-01-31 VITALS
SYSTOLIC BLOOD PRESSURE: 123 MMHG | DIASTOLIC BLOOD PRESSURE: 78 MMHG | WEIGHT: 161.6 LBS | BODY MASS INDEX: 23.13 KG/M2 | HEART RATE: 63 BPM | HEIGHT: 70 IN | OXYGEN SATURATION: 96 %

## 2018-01-31 DIAGNOSIS — L40.50 PSORIATIC ARTHRITIS (H): Primary | ICD-10-CM

## 2018-01-31 PROCEDURE — G0463 HOSPITAL OUTPT CLINIC VISIT: HCPCS | Mod: ZF

## 2018-01-31 ASSESSMENT — PAIN SCALES - GENERAL: PAINLEVEL: NO PAIN (0)

## 2018-01-31 NOTE — MR AVS SNAPSHOT
After Visit Summary   1/31/2018    Prince Pereira    MRN: [id number]           Patient Information     Date Of Birth          1955        Visit Information        Provider Department      1/31/2018 7:00 AM Tai Sanon MD [facility]        Today's Diagnoses     Psoriatic arthritis (H)    -  1       Follow-ups after your visit        Follow-up notes from your care team     Return in about 1 year (around 1/31/2019).      Your next 10 appointments already scheduled     Jan 30, 2019  7:00 AM CST   (Arrive by 6:45 AM)   Return Visit with Tai Sanon MD   [facility] ([facility])    [address]  [address]  [facility] [address] [phone]              Who to contact     If you have questions or need follow up information about today's clinic visit or your schedule please contact [facility] RHEUMATOLOG[facility] directly at [phone].  Normal or non-critical lab and imaging results will be communicated to you by MyChart, letter or phone within 4 business days after the clinic has received the results. If you do not hear from us within 7 days, please contact the clinic through [email or web address] or phone. If you have a critical or abnormal lab result, we will notify you by phone as soon as possible.  Submit refill requests through [employer] or call your pharmacy and they will forward the refill request to us. Please allow 3 business days for your refill to be completed.          Additional Information About Your Visit        [email or web address] Information     [employer] gives you secure access to your electronic health record. If you see a primary care provider, you can also send messages to your care team and make appointments. If you have questions, please call your primary care clinic.  If you do not have a primary care provider, please call [phone] and they will assist you.        Care EveryWhere ID     This is your Care EveryWhere ID. This could  "be used by other organizations to access your [place] medical records  [id number]        Your Vitals Were     Pulse Height Pulse Oximetry BMI (Body Mass Index)          63 1.778 m (5' 10\") 96% 23.19 kg/m2         Blood Pressure from Last 3 Encounters:   01/31/18 123/78   05/15/17 137/89   05/08/17 140/78    Weight from Last 3 Encounters:   01/31/18 73.3 kg (161 lb 9.6 oz)   05/15/17 72.8 kg (160 lb 9.6 oz)   05/08/17 72.6 kg (160 lb)              Today, you had the following     No orders found for display         Today's Medication Changes          These changes are accurate as of 1/31/18  7:28 AM.  If you have any questions, ask your nurse or doctor.               Stop taking these medicines if you haven't already. Please contact your care team if you have questions.     dabigatran ANTICOAGULANT 150 MG capsule   Commonly known as:  PRADAXA ANTICOAGULANT   Stopped by:  Tai Sanon MD           diclofenac 1 % Gel topical gel   Commonly known as:  VOLTAREN   Stopped by:  Tai Sanon MD           gabapentin 300 MG capsule   Commonly known as:  NEURONTIN   Stopped by:  Tai Sanon MD           HYDROcodone-acetaminophen 5-325 MG per tablet   Commonly known as:  NORCO   Stopped by:  Tai Sanon MD           methocarbamol 750 MG tablet   Commonly known as:  ROBAXIN   Stopped by:  Tai Sanon MD           methylPREDNISolone 4 MG tablet   Commonly known as:  MEDROL DOSEPAK   Stopped by:  Tai Sanon MD           metoprolol succinate 25 MG 24 hr tablet   Commonly known as:  TOPROL-XL   Stopped by:  Tai Sanon MD           NAPROXEN PO   Stopped by:  Tai Sanon MD                Where to get your medicines      Call your pharmacy to confirm that your medication is ready for pickup. It may take up to 24 hours for them to receive the prescription. If the prescription is not ready within 3 business days, please contact your clinic or your provider.     We will let you know when " these medications are ready. If you don't hear back within 3 business days, please contact us.     etanercept 50 MG/ML injection                Primary Care Provider Office Phone # Fax #    Brendan Ndiaye [phone]       [address] [place] [address]        Equal Access to Services     DOT PINEDA : Hadii aad ku hadasho Soomaali, waaxda luqadaha, qaybta kaalmada adeegyada, waxay idiin hayaan adejr kylecynthiavicente laruba sharp. So [facility] [phone].    ATENCIÓN: Si habla español, tiene a james disposición servicios gratuitos de asistencia lingüística. [facility] [phone].    We comply with applicable federal civil rights laws and [place] laws. We do not discriminate on the basis of race, color, national origin, age, disability, sex, sexual orientation, or gender identity.            Thank you!     Thank you for choosing [facility]HEUMAT[facility]  for your care. Our goal is always to provide you with excellent care. Hearing back from our patients is one way we can continue to improve our services. Please take a few minutes to complete the written survey that you may receive in the mail after your visit with us. Thank you!             Your Updated Medication List - Protect others around you: Learn how to safely use, store and throw away your medicines at www.disposemymeds.org.          This list is accurate as of 1/31/18  7:28 AM.  Always use your most recent med list.                   Brand Name Dispense Instructions for use Diagnosis    etanercept 50 MG/ML injection    ENBREL    4 Syringe    Inject 0.98 mLs (50 mg) Subcutaneous once a week Prefilled syringeHold for signs of infection, and then seek medical attention.    Psoriatic arthritis (H)       flecainide 100 MG tablet    TAMBOCOR    6 tablet    Take 1 tablet (100 mg) by mouth 2 times daily    Atrial fibrillation, unspecified type (H)

## 2018-01-31 NOTE — NURSING NOTE
"Chief Complaint   Patient presents with     RECHECK     Annual follow up for PSA       Initial /78  Pulse 63  Ht 1.778 m (5' 10\")  Wt 73.3 kg (161 lb 9.6 oz)  SpO2 96%  BMI 23.19 kg/m2 Estimated body mass index is 23.19 kg/(m^2) as calculated from the following:    Height as of this encounter: 1.778 m (5' 10\").    Weight as of this encounter: 73.3 kg (161 lb 9.6 oz).  Medication Reconciliation: complete   Mily Arciniega CMA    "

## 2018-01-31 NOTE — PROGRESS NOTES
"Rheumatology Visit     Prince Pereira MRN# [id number]   YOB: 1955 Age: 62 year old     Date of Visit: January 31, 2018  Primary care provider: Brendan Ndiaye          Assessment and Plan:   1. 61 yo male with 17+ year hx of Psoriatic Arthritis, well controlled on Enbrel. He did notice mild return of symptoms after about 7-8 weeks off drug in 2015 due to inguinal surgery complication. He is close to a clinical remission taking it less frequently often every 10 days.  2. Atrial Fibrillation, post ablation  3. Hx of Herpes Zoster, resolved.  4. Prior R temporal HA/facial pain, with negative evaluation including TA bx. This is resolved.  5. Change in bowel habits  PLAN: discussed in detail with the patient  1. See lab orders, medication orders and follow-up plans for this encounter.   2. Additional treatment plan consists of continue Enbrel, renewed for one year  3. We discussed potential side effects   4. RTC: in 1 year(s)   5. Letter: no - he is going to see his PCP this spring which I strongly encouraged especially to discuss bowel complaint.  Tai Sanon MD            History of Present Illness:    61 yo  male with a past medical history of psoriatic arthritis and atrial fibrillation. He is here today for followup. I saw him for the first time in June 2012 and last in February 2017. EPIC Reviewed.  HISTORY CARRIED FORWARD:  To review, he was diagnosed with psoriatic arthritis 14+ years ago, and it has been well controlled with Enbrel  He manages to stretch his injections to once every 8-10 days, and he notes that the development of canker sores are reproducibly an indication to take his medication. He has not noticed any inflammation or irritation at the injection site. He is not experiencing any active inflammation or pain. The patient states that his range of motion is \"almost back to normal,\" with his continued use of Enbrel. He has some intermittent shoulder pain on " R that is rotator cuff related, exacerbated by tennis.  Additionally, the patient has a past medical history of atrial fibrillation since 1988, that required recent cardioversion in February; he had a second episode days later that resolved with Flecanide. He was given flecainide to use if his symptoms return, but has not needed to use the medication to date. The patient currently takes metoprolol XL and aspirin for his atrial fibrillation. He also occasionally experiences reflux, and this has been managed with omeprazole once a day.   Overall, the patient has been quite pleased with his medication, and plans to continue using Enbrel to manage his psoriatic arthritis. He has had only a small patch of psoriasis on L shin now resolved almost with Triamcinolone.  He had a bout of herpes zoster in February 2013 that resolved. He had Valtrex and has no post herpetic pain. We discussed in the past the lack of consensus about risk vs. Benefit of Zostavax in patients on anti TNF therapy. He had no questions or concerns.   He saw Nina Smith late 2014 and then Dr. Pandey emergently in late December for question of possible GCA. CRP and ESR were normal. He did have a TA bx by Dr. Ramon Lagunas which was negative. His symptoms  gradually abated and were absent a year ago.  He had a hernia repair in 2015 with pre op physical by Dr. Ndiaye, and interval followup by Dr. Hawthorne regarding prior stable pulmonary nodules and does not require followup.  Unfortunately he had a post op hematoma after surgery that opened and had to heal by secondary intention.  He was off Enbrel for about 8 weeks due to this. He did note right at the end of that slight return of hand stiffness and pain and slightly more psoriasis. This came back under excellent control after resuming and he is now close to asymptomatic taking the Enbrel on average every 10-14 days.  INTERVAL HISTORY:       Happily he has had another uneventful year.  No interval  health problems. No infections.  He sees Cardiology yearly for management of his atrial fibrillation after ablation in .  He had a self limited episode last May at time of back injury.    His soft tissue back injury in May has almost resolved with minimal localized intermittent pain R lower back.     He has remained on long term Enbrel >15 years without problems. He often stretches it out to 10 days or so but after that will note some joint aching and mouth soreness.  No injection issues.     His mechanical foot issues are stable.    His only other concern is a mild change in bowel habits which sound irritable bowel in nature.  He had a colonoscopy at 54. He is overdue for routine visit with PCP and I encouraged that.    He apparently had lab work at [place] last May; I don't have those.     He is happy to continue on Enbrel.        Review of Systems:   Review Of Systems  Skin: see HPI  Eyes: negative  Ears/Nose/Throat: negative  Respiratory: No shortness of breath, dyspnea on exertion, cough, or hemoptysis  Cardiovascular: see HPI and CV note  Gastrointestinal: see HPI  Genitourinary: negative  Musculoskeletal: negative  Neurologic: negative  Psychiatric: negative  Hematologic/Lymphatic/Immunologic: negative  Endocrine: negative          Past Medical History:     Past Medical History:   Diagnosis Date     Arrhythmia      Atrial fibrillation (H)     paroxysmal with ablation 2014     Family history of prostate cancer     father  age 60     Herpes zoster 3/26/2013     Almendarez's neuroma of right foot      Psoriasis      psoriatic arthritis      Pulmonary nodules 2014    multiple noncalcified up to 6 mm needs f/u       Patient Active Problem List    Diagnosis Date Noted     Herpes zoster      Priority: Medium     S/P ablation of atrial fibrillation 2014     Priority: Medium     Pulmonary nodules 2014     Priority: Medium     multiple noncalcified up to 6 mm needs f/u       Psoriatic arthritis (H)  2012     Priority: Medium     Atrial fibrillation (H) 1987     Priority: Medium             Past Surgical History:     Past Surgical History:   Procedure Laterality Date     ANESTHESIA CARDIOVERSION  10/6/2011    Procedure:ANESTHESIA CARDIOVERSION; CARDIOVERSION; Surgeon:GENERIC ANESTHESIA PROVIDER; [facility]     ANESTHESIA CARDIOVERSION  2/10/2012    Procedure:ANESTHESIA CARDIOVERSION; CARDIOVERSION; Surgeon:GENERIC ANESTHESIA PROVIDER; [facility]     ANESTHESIA CARDIOVERSION  2013    Procedure: ANESTHESIA CARDIOVERSION;  Cardioversion;  Surgeon: Provider, Generic Anesthesia;  Location: [facility]     ANESTHESIA CARDIOVERSION  2013    Procedure: ANESTHESIA CARDIOVERSION;  Cardioversion;  Surgeon: Provider, Generic Anesthesia;  Location: [facility]     CATARACT IOL, RT/LT Left 2015     COLONOSCOPY       ENT SURGERY      tonsillectomy     HERNIORRHAPHY INGUINAL Right 7/15/2015    Procedure: HERNIORRHAPHY INGUINAL;  Surgeon: Bk Watts MD;  Location: [facility]     Left Atrial Wide Area Circumferential radiofrequency ablation  2014      for atrial fib     PHACOEMULSIFICATION CLEAR CORNEA WITH STANDARD INTRAOCULAR LENS IMPLANT Right 2015    Procedure: PHACOEMULSIFICATION CLEAR CORNEA WITH STANDARD INTRAOCULAR LENS IMPLANT;  Surgeon: Petr Moyer MD;  [facility]: [facility]             Social History:     Social History   Substance Use Topics     Smoking status: Former Smoker     Years: 1.00     Types: Cigarettes     Quit date: 1980     Smokeless tobacco: Never Used     Alcohol use 5.0 oz/week     10 Standard drinks or equivalent per week      Comment: occasional             Family History:     Family History   Problem Relation Age of Onset     C.A.D. Maternal Grandfather       age 63     Hypertension Maternal Grandfather      Hypertension Mother      Alzheimer Disease Mother      Prostate Cancer Father       age 60 agressive prostate ca     Alzheimer Disease  "Maternal Grandmother      Glaucoma No family hx of      Macular Degeneration No family hx of             Allergies:     Allergies   Allergen Reactions     No Clinical Screening - See Comments Rash     Cardioversion pads cause rash.             Medications:     Current Outpatient Prescriptions   Medication Sig Dispense Refill     etanercept (ENBREL) 50 MG/ML injection Inject 0.98 mLs (50 mg) Subcutaneous once a week Prefilled syringeHold for signs of infection, and then seek medical attention. 4 Syringe 11     flecainide (TAMBOCOR) 100 MG tablet Take 1 tablet (100 mg) by mouth 2 times daily (Patient not taking: Reported on 1/31/2018) 6 tablet 0            Physical Exam:   Blood pressure 123/78, pulse 63, height 1.778 m (5' 10\"), weight 73.3 kg (161 lb 9.6 oz), SpO2 96 %.  Constitutional: WD-WN-WG cooperative   Eyes: nl conjunctiva, sclera   ENT: nl external ears, nose, throat   No mucous membrane lesions, normal saliva pool   Neck: no mass or thyroid enlargement    Lymph: no cervical, supraclavicular, inguinal or epitrochlear nodes   MS: All TMJ, neck, shoulder, elbow, wrist, MCP/PIP/DIP, spine, hip, knee, ankle, and foot MTP/IP joints were examined and found normal.. No active synovitis or deformity. Full ROM. Normal  strength   Skin: no nail pitting, alopecia, rash, nodules. He has one patch of psoriasis on R lateral calf  Neuro: nl cranial nerves, strength   Psych: nl affect.       Data:     Lab Results   Component Value Date    WBC 4.5 07/13/2015    WBC 6.4 11/18/2014    WBC 5.3 08/01/2014    HGB 13.5 07/13/2015    HGB 13.8 11/18/2014    HGB 13.2 (L) 08/01/2014    HCT 39.2 (L) 07/13/2015    HCT 40.2 11/18/2014    HCT 40.4 08/01/2014    MCV 86 07/13/2015    MCV 87 11/18/2014    MCV 88 08/01/2014     07/13/2015     11/18/2014     08/01/2014     Lab Results   Component Value Date    BUN 14 07/13/2015    BUN 12 08/01/2014    BUN 14 02/13/2014     No components found for: SEDRATE  Lab Results "   Component Value Date    TSH 2.35 10/06/2011     Lab Results   Component Value Date    AST 26 08/01/2014    AST 26 11/06/2013    AST 24 04/11/2012    ALT 31 08/01/2014    ALT 30 11/06/2013    ALT 18 04/11/2012    ALKPHOS 77 08/01/2014    ALKPHOS 62 11/06/2013    ALKPHOS 59 04/11/2012     Reviewed Rheumatology lab flowsheet    Tai Sanon

## 2018-01-31 NOTE — LETTER
"1/31/2018    RE: Prince Pereira  [address]  [facility] [address]     Rheumatology Visit     Prince Pereira MRN# [id number]   YOB: 1955 Age: 62 year old     Date of Visit: January 31, 2018  Primary care provider: Brendan Ndiaye          Assessment and Plan:   1. 61 yo male with 17+ year hx of Psoriatic Arthritis, well controlled on Enbrel. He did notice mild return of symptoms after about 7-8 weeks off drug in 2015 due to inguinal surgery complication. He is close to a clinical remission taking it less frequently often every 10 days.  2. Atrial Fibrillation, post ablation  3. Hx of Herpes Zoster, resolved.  4. Prior R temporal HA/facial pain, with negative evaluation including TA bx. This is resolved.  5. Change in bowel habits  PLAN: discussed in detail with the patient  1. See lab orders, medication orders and follow-up plans for this encounter.   2. Additional treatment plan consists of continue Enbrel, renewed for one year  3. We discussed potential side effects   4. RTC: in 1 year(s)   5. Letter: no - he is going to see his PCP this spring which I strongly encouraged especially to discuss bowel complaint.  Tai Sanon MD            History of Present Illness:    [age] yo  male with a past medical history of psoriatic arthritis and atrial fibrillation. He is here today for followup. I saw him for the first time in June 2012 and last in February 2017. EPIC Reviewed.  HISTORY CARRIED FORWARD:  To review, he was diagnosed with psoriatic arthritis 14+ years ago, and it has been well controlled with Enbrel  He manages to stretch his injections to once every 8-10 days, and he notes that the development of canker sores are reproducibly an indication to take his medication. He has not noticed any inflammation or irritation at the injection site. He is not experiencing any active inflammation or pain. The patient states that his range of motion is \"almost " "back to normal,\" with his continued use of Enbrel. He has some intermittent shoulder pain on R that is rotator cuff related, exacerbated by tennis.  Additionally, the patient has a past medical history of atrial fibrillation since 1988, that required recent cardioversion in February; he had a second episode days later that resolved with Flecanide. He was given flecainide to use if his symptoms return, but has not needed to use the medication to date. The patient currently takes metoprolol XL and aspirin for his atrial fibrillation. He also occasionally experiences reflux, and this has been managed with omeprazole once a day.   Overall, the patient has been quite pleased with his medication, and plans to continue using Enbrel to manage his psoriatic arthritis. He has had only a small patch of psoriasis on L shin now resolved almost with Triamcinolone.  He had a bout of herpes zoster in February 2013 that resolved. He had Valtrex and has no post herpetic pain. We discussed in the past the lack of consensus about risk vs. Benefit of Zostavax in patients on anti TNF therapy. He had no questions or concerns.   He saw Nina Smith late 2014 and then Dr. Pandey emergently in late December for question of possible GCA. CRP and ESR were normal. He did have a TA bx by Dr. Ramon Lagunas which was negative. His symptoms  gradually abated and were absent a year ago.  He had a hernia repair in 2015 with pre op physical by Dr. Ndiaye, and interval followup by Dr. Hawthorne regarding prior stable pulmonary nodules and does not require followup.  Unfortunately he had a post op hematoma after surgery that opened and had to heal by secondary intention.  He was off Enbrel for about 8 weeks due to this. He did note right at the end of that slight return of hand stiffness and pain and slightly more psoriasis. This came back under excellent control after resuming and he is now close to asymptomatic taking the Enbrel on average every " 10-14 days.  INTERVAL HISTORY:       Happily he has had another uneventful year.  No interval health problems. No infections.  He sees Cardiology yearly for management of his atrial fibrillation after ablation in .  He had a self limited episode last May at time of back injury.    His soft tissue back injury in May has almost resolved with minimal localized intermittent pain R lower back.     He has remained on long term Enbrel >15 years without problems. He often stretches it out to 10 days or so but after that will note some joint aching and mouth soreness.  No injection issues.     His mechanical foot issues are stable.    His only other concern is a mild change in bowel habits which sound irritable bowel in nature.  He had a colonoscopy at 54. He is overdue for routine visit with PCP and I encouraged that.    He apparently had lab work at [place] last May; I don't have those.     He is happy to continue on Enbrel.        Review of Systems:   Review Of Systems  Skin: see HPI  Eyes: negative  Ears/Nose/Throat: negative  Respiratory: No shortness of breath, dyspnea on exertion, cough, or hemoptysis  Cardiovascular: see HPI and CV note  Gastrointestinal: see HPI  Genitourinary: negative  Musculoskeletal: negative  Neurologic: negative  Psychiatric: negative  Hematologic/Lymphatic/Immunologic: negative  Endocrine: negative          Past Medical History:     Past Medical History:   Diagnosis Date     Arrhythmia      Atrial fibrillation (H)     paroxysmal with ablation 2014     Family history of prostate cancer     father  age 60     Herpes zoster 3/26/2013     Almendarez's neuroma of right foot      Psoriasis      psoriatic arthritis      Pulmonary nodules 2014    multiple noncalcified up to 6 mm needs f/u       Patient Active Problem List    Diagnosis Date Noted     Herpes zoster      Priority: Medium     S/P ablation of atrial fibrillation 2014     Priority: Medium     Pulmonary nodules 2014      Priority: Medium     multiple noncalcified up to 6 mm needs f/u       Psoriatic arthritis (H) 2012     Priority: Medium     Atrial fibrillation (H) 1987     Priority: Medium             Past Surgical History:     Past Surgical History:   Procedure Laterality Date     ANESTHESIA CARDIOVERSION  10/6/2011    Procedure:ANESTHESIA CARDIOVERSION; CARDIOVERSION; Surgeon:GENERIC ANESTHESIA PROVIDER; Location[facility]     ANESTHESIA CARDIOVERSION  2/10/2012    Procedure:ANESTHESIA CARDIOVERSION; CARDIOVERSION; Surgeon:GENERIC ANESTHESIA PROVIDER; [facility]     ANESTHESIA CARDIOVERSION  2013    Procedure: ANESTHESIA CARDIOVERSION;  Cardioversion;  Surgeon: Provider, Generic Anesthesia;  Location: [facility]     ANESTHESIA CARDIOVERSION  2013    Procedure: ANESTHESIA CARDIOVERSION;  Cardioversion;  Surgeon: Provider, Generic Anesthesia;  Location: [facility]     CATARACT IOL, RT/LT Left 2015     COLONOSCOPY       ENT SURGERY      tonsillectomy     HERNIORRHAPHY INGUINAL Right 7/15/2015    Procedure: HERNIORRHAPHY INGUINAL;  Surgeon: Bk Watts MD;  Location: [facility]     Left Atrial Wide Area Circumferential radiofrequency ablation  2014      for atrial fib     PHACOEMULSIFICATION CLEAR CORNEA WITH STANDARD INTRAOCULAR LENS IMPLANT Right 2015    Procedure: PHACOEMULSIFICATION CLEAR CORNEA WITH STANDARD INTRAOCULAR LENS IMPLANT;  Surgeon: Petr Moyer MD;  Location: [facility]             Social History:     Social History   Substance Use Topics     Smoking status: Former Smoker     Years: 1.00     Types: Cigarettes     Quit date: 1980     Smokeless tobacco: Never Used     Alcohol use 5.0 oz/week     10 Standard drinks or equivalent per week      Comment: occasional             Family History:     Family History   Problem Relation Age of Onset     C.A.D. Maternal Grandfather       age 63     Hypertension Maternal Grandfather      Hypertension Mother      Alzheimer Disease  "Mother      Prostate Cancer Father       age 60 agressive prostate ca     Alzheimer Disease Maternal Grandmother      Glaucoma No family hx of      Macular Degeneration No family hx of             Allergies:     Allergies   Allergen Reactions     No Clinical Screening - See Comments Rash     Cardioversion pads cause rash.             Medications:     Current Outpatient Prescriptions   Medication Sig Dispense Refill     etanercept (ENBREL) 50 MG/ML injection Inject 0.98 mLs (50 mg) Subcutaneous once a week Prefilled syringeHold for signs of infection, and then seek medical attention. 4 Syringe 11     flecainide (TAMBOCOR) 100 MG tablet Take 1 tablet (100 mg) by mouth 2 times daily (Patient not taking: Reported on 2018) 6 tablet 0            Physical Exam:   Blood pressure 123/78, pulse 63, height 1.778 m (5' 10\"), weight 73.3 kg (161 lb 9.6 oz), SpO2 96 %.  Constitutional: WD-WN-WG cooperative   Eyes: nl conjunctiva, sclera   ENT: nl external ears, nose, throat   No mucous membrane lesions, normal saliva pool   Neck: no mass or thyroid enlargement    Lymph: no cervical, supraclavicular, inguinal or epitrochlear nodes   MS: All TMJ, neck, shoulder, elbow, wrist, MCP/PIP/DIP, spine, hip, knee, ankle, and foot MTP/IP joints were examined and found normal.. No active synovitis or deformity. Full ROM. Normal  strength   Skin: no nail pitting, alopecia, rash, nodules. He has one patch of psoriasis on R lateral calf  Neuro: nl cranial nerves, strength   Psych: nl affect.       Data:     Lab Results   Component Value Date    WBC 4.5 2015    WBC 6.4 2014    WBC 5.3 2014    HGB 13.5 2015    HGB 13.8 2014    HGB 13.2 (L) 2014    HCT 39.2 (L) 2015    HCT 40.2 2014    HCT 40.4 2014    MCV 86 2015    MCV 87 2014    MCV 88 2014     2015     2014     2014     Lab Results   Component Value Date    BUN 14 " 07/13/2015    BUN 12 08/01/2014    BUN 14 02/13/2014     No components found for: SEDRATE  Lab Results   Component Value Date    TSH 2.35 10/06/2011     Lab Results   Component Value Date    AST 26 08/01/2014    AST 26 11/06/2013    AST 24 04/11/2012    ALT 31 08/01/2014    ALT 30 11/06/2013    ALT 18 04/11/2012    ALKPHOS 77 08/01/2014    ALKPHOS 62 11/06/2013    ALKPHOS 59 04/11/2012     Reviewed Rheumatology lab flowsheet    Tai Sanon MD

## 2018-02-19 ENCOUNTER — MEDICAL CORRESPONDENCE (OUTPATIENT)
Dept: HEALTH INFORMATION MANAGEMENT | Facility: CLINIC | Age: 63
End: 2018-02-19

## 2018-02-19 ENCOUNTER — TRANSFERRED RECORDS (OUTPATIENT)
Dept: HEALTH INFORMATION MANAGEMENT | Facility: CLINIC | Age: 63
End: 2018-02-19

## 2018-02-19 ENCOUNTER — TELEPHONE (OUTPATIENT)
Dept: ORTHOPEDICS | Facility: CLINIC | Age: 63
End: 2018-02-19

## 2018-02-19 NOTE — TELEPHONE ENCOUNTER
APPT INFO    Date /Time: 2/20/18-    Reason for Appt: Rt 5th metatarsal fx   Ref Provider/Clinic: Dr. Archer    Are there internal records? Yes/No?  IF YES, list clinic names: No    Are there outside records? Yes/No? Yes   Patient Contact (Y/N) & Call Details: No- per telephone encounter, appt per Dr. Archer. Images are in PACS.    Action: Closing encounter.

## 2018-02-19 NOTE — TELEPHONE ENCOUNTER
Received message from Dr. Archer requesting that Prince be scheduled with Dr. Monsivais tomorrow for a right 5th Metatarsal fracture. Patient was given an appointment time for tomorrow at 5:50. Images are now available in PACs.  Patient verbalized understanding of appointment date, time and location.

## 2018-02-20 ENCOUNTER — OFFICE VISIT (OUTPATIENT)
Dept: ORTHOPEDICS | Facility: CLINIC | Age: 63
End: 2018-02-20
Payer: COMMERCIAL

## 2018-02-20 ENCOUNTER — PRE VISIT (OUTPATIENT)
Dept: ORTHOPEDICS | Facility: CLINIC | Age: 63
End: 2018-02-20

## 2018-02-20 DIAGNOSIS — M25.571 PAIN IN JOINT, ANKLE AND FOOT, RIGHT: Primary | ICD-10-CM

## 2018-02-20 ASSESSMENT — ENCOUNTER SYMPTOMS: RECTAL PAIN: 1

## 2018-02-20 NOTE — NURSING NOTE
Reason For Visit:   Chief Complaint   Patient presents with     Musculoskeletal Problem     Right 5th MT fx. DOI 2/18/18         Pain Assessment  Patient Currently in Pain: Yes  0-10 Pain Scale: 2  Primary Pain Location: Foot  Pain Orientation: Right  Pain Descriptors: Dull  Alleviating Factors: Rest  Aggravating Factors: Walking (contact)

## 2018-02-20 NOTE — LETTER
2/20/2018       RE: Prince Pereira  [address]  [facility] [address]     Dear Colleague,    Thank you for referring your patient, Prince Pereira, to the [facility]TH[facility] at [facility]. Please see a copy of my visit note below.    CHIEF COMPLAINT:  Right fifth metatarsal fracture sustained on 02/18/2018.      HISTORY OF PRESENT ILLNESS:  Mr. Ghosh is a 62-year-old male who presents today for evaluation of his right foot.  The patient reports to have sustained an injury while walking to his car 2 days ago.  He slipped on ice and sustained an acute onset of pain.  Subsequent to that, he was evaluated and diagnosed with a fifth metatarsal fracture.  The patient has been placed in a short CAM Walker and presents today for discussion of treatment options.      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      Reports otherwise to be a fairly active person and not to have any other significant problems with his foot.      PAST MEDICAL HISTORY:  Atrial fibrillation, status post ablation, as well as herpes zoster.      PAST SURGICAL HISTORY:  Relates to an inguinal hernia repair, cataract surgery, as well as the cardioversion for heart.      DRUG ALLERGIES:  None.      CURRENT MEDICATIONS:  Please refer to encounter form.      PHYSICAL EXAMINATION:  On today's visit, he presents as a pleasant male in no apparent distress with a height of 5 feet 10 inches and a weight of 161 pounds.  Denies to have any constitutional symptoms.      On today's visit, he presents with full range of motion of the right ankle and hindfoot.  Midfoot motion was limited by pain.  Presents with some swelling and ecchymosis along the lateral aspect of the foot.  The skin is intact.  There are no abrasions.  There are no fracture blisters.  Overall alignment of the forefoot is excellent.       RADIOGRAPHIC EVALUATION:  Plain x-rays from a previous visit were reviewed today which were significant for showing a minimally displaced oblique shaft fracture of the right fifth metatarsal.  The patient presents with excellent alignment and excellent apposition of both fragments.      ASSESSMENT:  Right fifth metatarsal fracture.      PLAN:  I discussed with the patient that at this point the recommendation would be to proceed with activities as tolerated as long as he remains comfortable.  I encouraged him to advance activities progressively, although I do not believe that he will be able to ambulate without the CAM Walker for at least 4 weeks.  At 6 weeks from now, he will obtain some x-rays and he will be communicating with me via email with regards to the timing for the x-rays and we will give further recommendations.      I do not expect him to require physical therapy during his recovery and I am estimating he will require 2-1/2 months to return to play tennis.      All questions were answered.  The patient was pleased with the discussion.  The patient will follow up accordingly.      TT 30 minutes, CT 20 minutes.         Again, thank you for allowing me to participate in the care of your patient.      Sincerely,    Dileep Monsivais MD

## 2018-02-20 NOTE — MR AVS SNAPSHOT
After Visit Summary   2/20/2018    Prince Pereira    MRN: [id number]           Patient Information     Date Of Birth          1955        Visit Information        Provider Department      2/20/2018 5:50 PM Dileep Monsivais MD [facility]        Today's Diagnoses     Pain in joint, ankle and foot, right    -  1       Follow-ups after your visit        Your next 10 appointments already scheduled     Jan 30, 2019  7:00 AM CST   (Arrive by 6:45 AM)   Return Visit with Tai Sanon MD   [facility] Rheumatology ([facility] [place])    [address] [place] [address]  [address] [place] [address] [phone]              Who to contact     Please call your clinic at [phone] to:    Ask questions about your health    Make or cancel appointments    Discuss your medicines    Learn about your test results    Speak to your doctor            Additional Information About Your Visit        MyChart Information     [employer] gives you secure access to your electronic health record. If you see a primary care provider, you can also send messages to your care team and make appointments. If you have questions, please call your primary care clinic.  If you do not have a primary care provider, please call [phone] and they will assist you.      [employer] is an electronic gateway that provides easy, online access to your medical records. With [employer], you can request a clinic appointment, read your test results, renew a prescription or communicate with your care team.     To access your existing account, please contact your [facility] or call [phone] for assistance.        Care EveryWhere ID     This is your Care EveryWhere ID. This could be used by other organizations to access your [place] medical records  [id number]         Blood Pressure from Last 3 Encounters:   01/31/18 123/78   05/15/17 137/89   05/08/17 140/78     Weight from Last 3 Encounters:   01/31/18 73.3 kg (161 lb 9.6 oz)   05/15/17 72.8 kg (160 lb 9.6 oz)   05/08/17 72.6 kg (160 lb)              Today, you had the following     No orders found for display       Primary Care Provider Office Phone # Fax #    Brendan Gonzalo Ndiaye [phone]       [address] [place] [address]  [facility] [address]        Equal Access to Services     DOT PINEDA : Hadii aad ku hadasho Soomaali, waaxda luqadaha, qaybta kaalmada adeegyada, waxay idiin hayaan adeeg kharash la'victor manuel sharp. So [facility] [phone].    ATENCIÓN: Si habla español, tiene a james disposición servicios gratuitos de asistencia lingüística. [facility] [phone].    We comply with applicable federal civil rights laws and [place] laws. We do not discriminate on the basis of race, color, national origin, age, disability, sex, sexual orientation, or gender identity.            Thank you!     Thank you for choosing [facility]TH[facility]  for your care. Our goal is always to provide you with excellent care. Hearing back from our patients is one way we can continue to improve our services. Please take a few minutes to complete the written survey that you may receive in the mail after your visit with us. Thank you!             Your Updated Medication List - Protect others around you: Learn how to safely use, store and throw away your medicines at www.disposemymeds.org.          This list is accurate as of 2/20/18 11:59 PM.  Always use your most recent med list.                   Brand Name Dispense Instructions for use Diagnosis    etanercept 50 MG/ML injection    ENBREL    4 Syringe    Inject 0.98 mLs (50 mg) Subcutaneous once a week Prefilled syringeHold for signs of infection, and then seek medical attention.    Psoriatic arthritis (H)       flecainide 100 MG tablet    TAMBOCOR    6 tablet    Take 1 tablet (100 mg) by mouth 2 times daily    Atrial fibrillation, unspecified type (H)

## 2018-02-21 NOTE — PROGRESS NOTES
CHIEF COMPLAINT:  Right fifth metatarsal fracture sustained on 02/18/2018.      HISTORY OF PRESENT ILLNESS:  Mr. Ghosh is a 62-year-old male who presents today for evaluation of his right foot.  The patient reports to have sustained an injury while walking to his car 2 days ago.  He slipped on ice and sustained an acute onset of pain.  Subsequent to that, he was evaluated and diagnosed with a fifth metatarsal fracture.  The patient has been placed in a short CAM Walker and presents today for discussion of treatment options.      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      Reports otherwise to be a fairly active person and not to have any other significant problems with his foot.      PAST MEDICAL HISTORY:  Atrial fibrillation, status post ablation, as well as herpes zoster.      PAST SURGICAL HISTORY:  Relates to an inguinal hernia repair, cataract surgery, as well as the cardioversion for heart.      DRUG ALLERGIES:  None.      CURRENT MEDICATIONS:  Please refer to encounter form.      PHYSICAL EXAMINATION:  On today's visit, he presents as a pleasant male in no apparent distress with a height of 5 feet 10 inches and a weight of 161 pounds.  Denies to have any constitutional symptoms.      On today's visit, he presents with full range of motion of the right ankle and hindfoot.  Midfoot motion was limited by pain.  Presents with some swelling and ecchymosis along the lateral aspect of the foot.  The skin is intact.  There are no abrasions.  There are no fracture blisters.  Overall alignment of the forefoot is excellent.      RADIOGRAPHIC EVALUATION:  Plain x-rays from a previous visit were reviewed today which were significant for showing a minimally displaced oblique shaft fracture of the right fifth metatarsal.  The patient presents with excellent alignment and excellent apposition of both fragments.      ASSESSMENT:  Right fifth  metatarsal fracture.      PLAN:  I discussed with the patient that at this point the recommendation would be to proceed with activities as tolerated as long as he remains comfortable.  I encouraged him to advance activities progressively, although I do not believe that he will be able to ambulate without the CAM Walker for at least 4 weeks.  At 6 weeks from now, he will obtain some x-rays and he will be communicating with me via email with regards to the timing for the x-rays and we will give further recommendations.      I do not expect him to require physical therapy during his recovery and I am estimating he will require 2-1/2 months to return to play tennis.      All questions were answered.  The patient was pleased with the discussion.  The patient will follow up accordingly.      TT 30 minutes, CT 20 minutes.

## 2018-07-31 ENCOUNTER — OFFICE VISIT (OUTPATIENT)
Dept: INTERNAL MEDICINE | Facility: CLINIC | Age: 63
End: 2018-07-31
Payer: COMMERCIAL

## 2018-07-31 ENCOUNTER — TELEPHONE (OUTPATIENT)
Dept: GASTROENTEROLOGY | Facility: CLINIC | Age: 63
End: 2018-07-31

## 2018-07-31 VITALS
DIASTOLIC BLOOD PRESSURE: 94 MMHG | HEART RATE: 74 BPM | OXYGEN SATURATION: 97 % | WEIGHT: 163.8 LBS | SYSTOLIC BLOOD PRESSURE: 138 MMHG | RESPIRATION RATE: 20 BRPM | BODY MASS INDEX: 23.5 KG/M2

## 2018-07-31 DIAGNOSIS — R19.7 DIARRHEA, UNSPECIFIED TYPE: Primary | ICD-10-CM

## 2018-07-31 DIAGNOSIS — R03.0 ELEVATED BLOOD PRESSURE READING WITHOUT DIAGNOSIS OF HYPERTENSION: ICD-10-CM

## 2018-07-31 DIAGNOSIS — K62.5 RECTAL BLEEDING: ICD-10-CM

## 2018-07-31 DIAGNOSIS — K64.4 EXTERNAL HEMORRHOIDS: ICD-10-CM

## 2018-07-31 NOTE — MR AVS SNAPSHOT
After Visit Summary   7/31/2018    Prince Pereira    MRN: [id number]           Patient Information     Date Of Birth          1955        Visit Information        Provider Department      7/31/2018 7:00 AM Joni Bangura MD [facility]        Today's Diagnoses     Diarrhea, unspecified type    -  1    Rectal bleeding        Elevated blood pressure reading without diagnosis of hypertension        External hemorrhoids          Care Instructions    Primary Care Center Medication Refill Request Information:  * Please contact your pharmacy regarding ANY request for medication refills.  ** [facility] Prescription Fax = [phone]  * Please allow 3 business days for routine medication refills.  * Please allow 5 business days for controlled substance medication refills.     Primary Care Center Test Result notification information:  *You will be notified with in 7-10 days of your appointment day regarding the results of your test.  If you are on MyChart you will be notified as soon as the provider has reviewed the results and signed off on them.    [facility]: [phone]           Follow-ups after your visit        Additional Services     GASTROENTEROLOGY ADULT REF PROCEDURE ONLY       Last Lab Result: Creatinine (mg/dL)       Date                     Value                 07/13/2015               0.86             ----------  Body mass index is 23.5 kg/(m^2).     Needed:  No  Language:  English    Patient will be contacted to schedule procedure.     Please be aware that coverage of these services is subject to the terms and limitations of your health insurance plan.  Call member services at your health plan with any benefit or coverage questions.  Any procedures must be performed at a [facility] [place] facility OR coordinated by your clinic's referral office.    Please bring the following with you to your appointment:    (1) Any X-Rays, CTs or MRIs which have  been performed.  Contact the facility where they were done to arrange for  prior to your scheduled appointment.    (2) List of current medications   (3) This referral request   (4) Any documents/labs given to you for this referral                  Your next 10 appointments already scheduled     Aug 15, 2018  7:45 AM CDT   (Arrive by 7:30 AM)   PHYSICAL with ARASH Mckoy CNP   [facility] Primary [facility] Clinic ([facility] [place])    [address]  [address]  [facility] [address] [phone]            Jan 30, 2019  7:00 AM CST   (Arrive by 6:45 AM)   Return Visit with Tai Sanon MD   [facility] Rheumatology ([facility])    [address]  [address]  [facility] [address] [phone]              Future tests that were ordered for you today     Open Future Orders        Priority Expected Expires Ordered    Stool Giardia antigen Routine 7/31/2018 7/31/2019 7/31/2018            Who to contact     Please call your clinic at [phone] to:    Ask questions about your health    Make or cancel appointments    Discuss your medicines    Learn about your test results    Speak to your doctor            Additional Information About Your Visit        [employer] Information     [employer] gives you secure access to your electronic health record. If you see a primary care provider, you can also send messages to your care team and make appointments. If you have questions, please call your primary care clinic.  If you do not have a primary care provider, please call [phone] and they will assist you.      [employer] is an electronic gateway that provides easy, online access to your medical records. With [employer], you can request a clinic appointment, read your test results, renew a prescription or communicate with your care team.     To access your existing account, please contact your [facility] or call [phone]  for assistance.        Care EveryWhere ID     This is your Care EveryWhere ID. This could be used by other organizations to access your [place] medical records  [id number]        Your Vitals Were     Pulse Respirations Pulse Oximetry BMI (Body Mass Index)          74 20 97% 23.5 kg/m2         Blood Pressure from Last 3 Encounters:   07/31/18 (!) 138/94   01/31/18 123/78   05/15/17 137/89    Weight from Last 3 Encounters:   07/31/18 74.3 kg (163 lb 12.8 oz)   01/31/18 73.3 kg (161 lb 9.6 oz)   05/15/17 72.8 kg (160 lb 9.6 oz)              We Performed the Following     GASTROENTEROLOGY ADULT REF PROCEDURE ONLY        Primary Care Provider Office Phone # Fax #    Brendan Ndiaye [phone]       [address] [place] [address]        Equal Access to Services     DOT PINEDA : Hadii aad ku hadasho Soesperanzaali, waaxda luqadaha, qaybta kaalmada adeegyada, waxay rejiin hayvictor manuel jones . So [facility] [phone].    ATENCIÓN: Si habla español, tiene a james disposición servicios gratuitos de asistencia lingüística. [facility] al [phone].    We comply with applicable federal civil rights laws and [place] laws. We do not discriminate on the basis of race, color, national origin, age, disability, sex, sexual orientation, or gender identity.            Thank you!     Thank you for choosing [facility]IMARY [facility]ARE CLINIC  for your care. Our goal is always to provide you with excellent care. Hearing back from our patients is one way we can continue to improve our services. Please take a few minutes to complete the written survey that you may receive in the mail after your visit with us. Thank you!             Your Updated Medication List - Protect others around you: Learn how to safely use, store and throw away your medicines at www.disposemymeds.org.          This list is accurate as of 7/31/18  7:15 PM.  Always use your most recent med list.                   Brand Name Dispense  Instructions for use Diagnosis    etanercept 50 MG/ML injection    ENBREL    4 Syringe    Inject 0.98 mLs (50 mg) Subcutaneous once a week Prefilled syringeHold for signs of infection, and then seek medical attention.    Psoriatic arthritis (H)       flecainide 100 MG tablet    TAMBOCOR    6 tablet    Take 1 tablet (100 mg) by mouth 2 times daily    Atrial fibrillation, unspecified type (H)

## 2018-07-31 NOTE — NURSING NOTE
Chief Complaint   Patient presents with     Gastrointestinal Problem     Irregular bowel problems ( formed to loose) that is causing hemorrhoid problems     Pain     joints of hand and feet   Peggy Henry LPN 6:53 AM on 7/31/2018    Rooming Note  Health Maintenance   Health Maintenance Due   Topic Date Due     HIV SCREEN (SYSTEM ASSIGNED)  10/07/1972     ADVANCE DIRECTIVE PLANNING Q5 YRS  10/07/2009    All health maintenance items discussed and pended.Rooming Note  Blood Pressure   BP Readings from Last 1 Encounters:   07/31/18 142/79    Single BP recheck started, 6:54 AM (4 minutes)  Peggy Henry LPN 6:54 AM on 7/31/2018    Information given to patient about Health Directive.Peggy Henry LPN 6:55 AM on 7/31/2018

## 2018-07-31 NOTE — PATIENT INSTRUCTIONS
[facility] Medication Refill Request Information:  * Please contact your pharmacy regarding ANY request for medication refills.  ** [facility] Prescription Fax = [phone]  * Please allow 3 business days for routine medication refills.  * Please allow 5 business days for controlled substance medication refills.     [facility] Test Result notification information:  *You will be notified with in 7-10 days of your appointment day regarding the results of your test.  If you are on MyChart you will be notified as soon as the provider has reviewed the results and signed off on them.    [facility]: [phone]

## 2018-08-01 ENCOUNTER — HOSPITAL ENCOUNTER (OUTPATIENT)
Facility: CLINIC | Age: 63
End: 2018-08-01
Attending: INTERNAL MEDICINE | Admitting: INTERNAL MEDICINE

## 2018-08-01 NOTE — PROGRESS NOTES
SUBJECTIVE: Chief complaint: Diarrhea and hemorrhoids.  This 62-year-old man reports a 2-3 year history of intermittent constipation followed by excessive flatulence, a single large formed stool, and 2-3 loose stools over a period of 1 hour, with cycles repeating every 4-6 days.  As result of his frequent bowel movements, he typically experiences painful, protuberant hemorrhoids which interfere with normal activities before subsiding with use of topical treatments and soaking in cool water.  He reports a several-month history of recurring diarrhea approximate 4 years ago, since which time he has not returned to his baseline of normal bowel movements.  He reports well water exposure with regular visits [place] near the [place] [facility] and occasional visits to a shared cabin in [place].  He denies change in medication, reporting rare use of flecainide and unchanged dosing of etanercept.  Most recent colonoscopy was completed at age 54.  He reports outside blood pressure readings in the 130s/80s on intermittent checks and questions whether treatment for hypertension is warranted based on recent more stringent guidelines.    Past Medical History: Reviewed and updated in patient health profile.     Adverse Drug Reactions: Reviewed and updated in patient health profile.     Current Medications: Reviewed and updated in patient health profile.     OBJECTIVE:     Vital signs: Reviewed in patient health profile.  Initial blood pressure 138/94; repeat reading 142/79.    General: Alert, neatly dressed and groomed, in no acute distress.  HEENT: Atraumatic and normocephalic. Eyelids, pupils, and conjunctivae appeared normal. Lips, teeth and gums appeared normal. Oropharynx showed moist mucous membranes, without exudate or erythema.   Neck: Supple, without thyromegaly, mass, or bruit. No cervical or supraclavicular lymphadenopathy.  Back: No spinal or costovertebral angle tenderness.  Chest: Clear  to auscultation and percussion. Normal respiratory effort.  Cardiovascular: No jugular venous distention. Regular rate and rhythm, normal S1, S2 without murmur.  Abdomen: Bowel sounds positive; soft, nontender, without rebound, guarding, hepatosplenomegaly or mass.  Extremities: No cyanosis or edema.     ASSESSMENT:    1.  Cyclical constipation and diarrhea with resultant hemorrhoids.  No identified triggers such as changes in diet or medication.  Known exposure to well water.  He reports the onset of symptoms coincided with persistent severe diarrhea several years ago.  We discussed potential symptoms, including Giardia and lactose intolerance, as well as options for further evaluation and treatment.  Because his most recent colonoscopy was in 2005, I recommended that he schedule a colonoscopy, especially given his report of intermittent rectal bleeding.  He denies abdominal pain and purulent discharge.  He was advised to follow a lactose-free diet, and to use fiber supplements with increased water intake in the event that symptoms persist despite elimination of dietary lactose.  Stool test for Giardia will be arranged.  Further recommendations will be based on response to these measures and test and colonoscopy results.    2.  Elevated blood pressure.  I recommended measurement of blood pressure at home, following usual guidelines regarding setting and technique.  He is planning to schedule a physical examination in the next few weeks and will review home blood pressure readings with his physician at that time.    3.  Presumed hemorrhoids.  No active symptoms.  Given report of intermittent symptoms related to frequent loose stools and associated with rectal bleeding, colonoscopy will be arranged, as noted above.  We discussed the strategy of fiber supplementation, copious water intake, and use of topical treatments as needed.  Further evaluation should be arranged in the event that symptoms persist despite above  measures.    PLAN:  See above.    Total time was 25 minutes.  Counseling time was 15 minutes.  We discussed potential causes of his symptoms, plans for further evaluation, treatment, and follow-up.     Please note that the above medical document was created with use of speech recognition software and may contain typographical errors.

## 2018-08-15 ENCOUNTER — OFFICE VISIT (OUTPATIENT)
Dept: INTERNAL MEDICINE | Facility: CLINIC | Age: 63
End: 2018-08-15
Payer: COMMERCIAL

## 2018-08-15 VITALS
HEART RATE: 87 BPM | HEIGHT: 70 IN | DIASTOLIC BLOOD PRESSURE: 82 MMHG | BODY MASS INDEX: 22.98 KG/M2 | SYSTOLIC BLOOD PRESSURE: 124 MMHG | WEIGHT: 160.5 LBS

## 2018-08-15 DIAGNOSIS — Z00.00 ROUTINE HISTORY AND PHYSICAL EXAMINATION OF ADULT: Primary | ICD-10-CM

## 2018-08-15 DIAGNOSIS — Z80.42 FAMILY HISTORY OF PROSTATE CANCER IN FATHER: ICD-10-CM

## 2018-08-15 DIAGNOSIS — Z13.220 SCREENING FOR HYPERLIPIDEMIA: ICD-10-CM

## 2018-08-15 ASSESSMENT — ENCOUNTER SYMPTOMS
BACK PAIN: 0
JAUNDICE: 0
VOMITING: 0
BOWEL INCONTINENCE: 0
SKIN CHANGES: 0
CONSTIPATION: 1
POOR WOUND HEALING: 0
NECK PAIN: 0
BLOOD IN STOOL: 0
MUSCLE WEAKNESS: 0
JOINT SWELLING: 1
NAIL CHANGES: 0
BLOATING: 0
MYALGIAS: 0
RECTAL PAIN: 1
ABDOMINAL PAIN: 0
STIFFNESS: 1
HEARTBURN: 1
DIARRHEA: 1
NAUSEA: 0
ARTHRALGIAS: 1
MUSCLE CRAMPS: 0

## 2018-08-15 ASSESSMENT — PAIN SCALES - GENERAL: PAINLEVEL: NO PAIN (0)

## 2018-08-15 NOTE — MR AVS SNAPSHOT
After Visit Summary   8/15/2018    Prince Pereira    MRN: [id number]           Patient Information     Date Of Birth          1955        Visit Information        Provider Department      8/15/2018 7:45 AM Armida Arauz APRN [facility]        Today's Diagnoses     Routine history and physical examination of adult    -  1    Family history of prostate cancer in father        Screening for hyperlipidemia          Care Instructions    Primary Care Center Medication Refill Request Information:  * Please contact your pharmacy regarding ANY request for medication refills.  ** [facility] Prescription Fax = [phone]  * Please allow 3 business days for routine medication refills.  * Please allow 5 business days for controlled substance medication refills.     Primary Care Center Test Result notification information:  *You will be notified with in 7-10 days of your appointment day regarding the results of your test.  If you are on MyChart you will be notified as soon as the provider has reviewed the results and signed off on them.    Primary Care Center: [phone]     Please go to the lab on the first floor before you leave today.             Follow-ups after your visit        Your next 10 appointments already scheduled     Sep 05, 2018  9:30 AM CDT   Colonoscopy with Lester Henderson MD   [facility] ([facility])    [address]  [address]  [facility] [facility] [id number] [phone]            Jan 30, 2019  7:00 AM CST   (Arrive by 6:45 AM)   Return Visit with Tai Sanon MD   [facility] ([facility])    [address]  [address]  [facility] [place] [facility] [id number] [phone]              Future tests that were ordered for you today     Open Future Orders        Priority Expected Expires Ordered    Lipid panel reflex to direct LDL Fasting Routine 8/15/2018 8/29/2018 8/15/2018     "PSA screen Routine 8/15/2018 8/15/2019 8/15/2018    Basic metabolic panel Routine 8/15/2018 8/29/2018 8/15/2018    CBC with platelets differential Routine 8/15/2018 8/29/2018 8/15/2018            Who to contact     Please call your clinic at [phone] to:    Ask questions about your health    Make or cancel appointments    Discuss your medicines    Learn about your test results    Speak to your doctor            Additional Information About Your Visit        [employer] Information     [employer] gives you secure access to your electronic health record. If you see a primary care provider, you can also send messages to your care team and make appointments. If you have questions, please call your primary care clinic.  If you do not have a primary care provider, please call [phone] and they will assist you.      [employer] is an electronic gateway that provides easy, online access to your medical records. With [employer], you can request a clinic appointment, read your test results, renew a prescription or communicate with your care team.     To access your existing account, please contact your [facility] or call [phone] for assistance.        Care EveryWhere ID     This is your Care EveryWhere ID. This could be used by other organizations to access your [place] medical records  [id number]        Your Vitals Were     Pulse Height BMI (Body Mass Index)             87 1.775 m (5' 9.88\") 23.11 kg/m2          Blood Pressure from Last 3 Encounters:   08/15/18 124/82   07/31/18 (!) 138/94   01/31/18 123/78    Weight from Last 3 Encounters:   08/15/18 72.8 kg (160 lb 8 oz)   07/31/18 74.3 kg (163 lb 12.8 oz)   01/31/18 73.3 kg (161 lb 9.6 oz)               Primary Care Provider Office Phone # Fax #    Brendan Ndiaye [phone]       [address] [place] [address]        Equal Access to Services     DOT PINEDA AH: Hadii pal Johnson " cam sandramaxx martínezsimon osman ah. So [facility] [place] [phone].    ATENCIÓN: Si ethan payton, tiene a james disposición servicios gratuitos de asistencia lingüística. Paulineame al [phone].    We comply with applicable federal civil rights laws and [place] laws. We do not discriminate on the basis of race, color, national origin, age, disability, sex, sexual orientation, or gender identity.            Thank you!     Thank you for choosing [facility]  for your care. Our goal is always to provide you with excellent care. Hearing back from our patients is one way we can continue to improve our services. Please take a few minutes to complete the written survey that you may receive in the mail after your visit with us. Thank you!             Your Updated Medication List - Protect others around you: Learn how to safely use, store and throw away your medicines at www.disposemymeds.org.          This list is accurate as of 8/15/18  8:04 AM.  Always use your most recent med list.                   Brand Name Dispense Instructions for use Diagnosis    etanercept 50 MG/ML injection    ENBREL    4 Syringe    Inject 0.98 mLs (50 mg) Subcutaneous once a week Prefilled syringeHold for signs of infection, and then seek medical attention.    Psoriatic arthritis (H)       flecainide 100 MG tablet    TAMBOCOR    6 tablet    Take 1 tablet (100 mg) by mouth 2 times daily    Atrial fibrillation, unspecified type (H)

## 2018-08-15 NOTE — PROGRESS NOTES
SUBJECTIVE:  Prince Pereira is a 62 year old male with pmh of   Past Medical History:   Diagnosis Date     Arrhythmia      Atrial fibrillation (H)     paroxysmal with ablation 2014     Family history of prostate cancer     father  age 60     Herpes zoster 3/26/2013     Almendarez's neuroma of right foot      Psoriasis      psoriatic arthritis      Pulmonary nodules 2014    multiple noncalcified up to 6 mm needs f/u     Who comes in for preventive examination today.     -Bowel issues: lactose free diet has made a difference in bowel regularity. Previously had several days without BM, and then loose/explosive stools. Now more regular and soft/formed stools.  -Prostate Ca in father,  at age 61. Would like to repeat screening today.  -One brief self correcting episode of a-fib since ablation in   -1 year ago May 2017, landed on back from fall in a fishing boat, had intense nerve pain--steroids, gabapentin, gradually resolved. Still will flare up with occasional tenderness.     Answers for HPI/ROS submitted by the patient on 8/15/2018   General Symptoms: No  Skin Symptoms: Yes  HENT Symptoms: No  EYE SYMPTOMS: No  HEART SYMPTOMS: No  LUNG SYMPTOMS: No  INTESTINAL SYMPTOMS: Yes  URINARY SYMPTOMS: No  REPRODUCTIVE SYMPTOMS: No  SKELETAL SYMPTOMS: Yes  BLOOD SYMPTOMS: No  NERVOUS SYSTEM SYMPTOMS: No  MENTAL HEALTH SYMPTOMS: No  Changes in hair: No  Changes in moles/birth marks: No  Itching: Yes  Rashes: Yes  Changes in nails: No  Acne: No  Change in facial hair: No  Warts: No  Non-healing sores: No  Scarring: No  Flaking of skin: Yes--Psoriatic arthritis, on Enbrel  Color changes of hands/feet in cold : No  Sun sensitivity: No  Skin thickening: No  Heart burn or indigestion: Yes--Uses Tums 4-5 times per week, improves symptoms.  Nausea: No  Vomiting: No  Abdominal pain: No  Bloating: No  Constipation: Yes  Diarrhea: Yes  Blood in stool: No  Black stools: No  Rectal or Anal pain: Yes  Fecal  "incontinence: No  Yellowing of skin or eyes: No  Vomit with blood: No  Change in stools: Yes--see above  Back pain: No  Muscle aches: No  Neck pain: No  Swollen joints: Yes--on Enbrel for psoriatic arthritis. Plans to schedule f/u with Rheumatology  Joint pain: Yes  Bone pain: No  Muscle cramps: No  Muscle weakness: No  Joint stiffness: Yes  Bone fracture: No      Medications and allergies were reviewed by me today.     Family History   Problem Relation Age of Onset     C.A.D. Maternal Grandfather       age 63     Hypertension Maternal Grandfather      Hypertension Mother      Alzheimer Disease Mother      Prostate Cancer Father       age 60 agressive prostate ca     Alzheimer Disease Maternal Grandmother      Glaucoma No family hx of      Macular Degeneration No family hx of        Social History   Substance Use Topics     Smoking status: Former Smoker     Years: 1.00     Types: Cigarettes     Quit date: 1980     Smokeless tobacco: Never Used     Alcohol use 5.0 oz/week     10 Standard drinks or equivalent per week      Comment: occasional       OBJECTIVE:    /82  Pulse 87  Ht 1.775 m (5' 9.88\")  Wt 72.8 kg (160 lb 8 oz)  BMI 23.11 kg/m2    GENERAL APPEARANCE: healthy, alert and no distress  EYES: Eyes grossly normal to inspection, PERRL and conjunctivae and sclerae normal  HENT: ear canals and TM's normal and nose and mouth without ulcers or lesions  NECK: no adenopathy, no asymmetry, masses, or scars and thyroid normal to palpation  RESP: lungs clear to auscultation - no rales, rhonchi or wheezes  BREAST: normal without masses, tenderness or nipple discharge and no palpable axillary masses or adenopathy  CV: regular rates and rhythm, normal S1 S2, no S3 or S4 and no murmur, click or rub  LYMPHATICS: no cervical adenopathy  GI: soft, nontender, without hepatosplenomegaly or masses, scar from R inguinal hernia repair  : external hemorrhoid at 3 o'clock position, prostate normal  MS: " extremities normal- no gross deformities noted  SKIN: no suspicious lesions or rashes, hyperpigmented psoriatic patch on lateral R ankle  NEURO: Normal strength and tone, mentation intact and speech normal, patellar DTR 2+ bilat  PSYCH: mentation appears normal and affect normal/bright        ASSESSMENT/PLAN:  Pt is a 62 year old male here for preventive examination    1. Routine history and physical examination of adult  Healthy lifestyle reviewed and encouraged. Work on increasing physical activity, good nutrition. Reviewed back stretches for lower back pain. He does not have an advance health care directive, this was briefly discussed, and he will provide records in the future.  - Basic metabolic panel; Future  - CBC with platelets differential; Future    2. Family history of prostate cancer in father  Will recheck today given hx of aggressive prostate ca in his father, diagnosed at age 60.  - PSA screen; Future    3. Screening for hyperlipidemia  Borderline within the past few years; +[facility] hyperlipidemia. He is fasting today, will recheck.  - Lipid panel reflex to direct LDL Fasting; Future    FOLLOW UP: next preventive care visit, or as needed for any changes or concerns    Colorectal screening not indicated    Osteoporosis screening not indicated.    Immunizations reviewed  Labs ordered  As above     ARASH Wilson CNP

## 2018-08-15 NOTE — PATIENT INSTRUCTIONS
[facility] Medication Refill Request Information:  * Please contact your pharmacy regarding ANY request for medication refills.  ** [facility] Prescription Fax = [phone]  * Please allow 3 business days for routine medication refills.  * Please allow 5 business days for controlled substance medication refills.     [facility] Test Result notification information:  *You will be notified with in 7-10 days of your appointment day regarding the results of your test.  If you are on MyChart you will be notified as soon as the provider has reviewed the results and signed off on them.    [facility]: [phone]     Please go to the lab on the first floor before you leave today.

## 2018-08-20 DIAGNOSIS — Z00.00 ROUTINE HISTORY AND PHYSICAL EXAMINATION OF ADULT: ICD-10-CM

## 2018-08-20 DIAGNOSIS — Z80.42 FAMILY HISTORY OF PROSTATE CANCER IN FATHER: ICD-10-CM

## 2018-08-20 DIAGNOSIS — Z13.220 SCREENING FOR HYPERLIPIDEMIA: ICD-10-CM

## 2018-08-20 LAB
ANION GAP SERPL CALCULATED.3IONS-SCNC: 6 MMOL/L (ref 3–14)
BASOPHILS # BLD AUTO: 0 10E9/L (ref 0–0.2)
BASOPHILS NFR BLD AUTO: 0.4 %
BUN SERPL-MCNC: 14 MG/DL (ref 7–30)
CALCIUM SERPL-MCNC: 8.5 MG/DL (ref 8.5–10.1)
CHLORIDE SERPL-SCNC: 109 MMOL/L (ref 94–109)
CHOLEST SERPL-MCNC: 174 MG/DL
CO2 SERPL-SCNC: 26 MMOL/L (ref 20–32)
CREAT SERPL-MCNC: 0.89 MG/DL (ref 0.66–1.25)
DIFFERENTIAL METHOD BLD: ABNORMAL
EOSINOPHIL # BLD AUTO: 0.2 10E9/L (ref 0–0.7)
EOSINOPHIL NFR BLD AUTO: 3.4 %
ERYTHROCYTE [DISTWIDTH] IN BLOOD BY AUTOMATED COUNT: 12.2 % (ref 10–15)
GFR SERPL CREATININE-BSD FRML MDRD: 86 ML/MIN/1.7M2
GLUCOSE SERPL-MCNC: 99 MG/DL (ref 70–99)
HCT VFR BLD AUTO: 39.1 % (ref 40–53)
HDLC SERPL-MCNC: 62 MG/DL
HGB BLD-MCNC: 13.3 G/DL (ref 13.3–17.7)
IMM GRANULOCYTES # BLD: 0 10E9/L (ref 0–0.4)
IMM GRANULOCYTES NFR BLD: 0.4 %
LDLC SERPL CALC-MCNC: 105 MG/DL
LYMPHOCYTES # BLD AUTO: 1.9 10E9/L (ref 0.8–5.3)
LYMPHOCYTES NFR BLD AUTO: 37.9 %
MCH RBC QN AUTO: 30.9 PG (ref 26.5–33)
MCHC RBC AUTO-ENTMCNC: 34 G/DL (ref 31.5–36.5)
MCV RBC AUTO: 91 FL (ref 78–100)
MONOCYTES # BLD AUTO: 0.6 10E9/L (ref 0–1.3)
MONOCYTES NFR BLD AUTO: 11.4 %
NEUTROPHILS # BLD AUTO: 2.3 10E9/L (ref 1.6–8.3)
NEUTROPHILS NFR BLD AUTO: 46.5 %
NONHDLC SERPL-MCNC: 112 MG/DL
NRBC # BLD AUTO: 0 10*3/UL
NRBC BLD AUTO-RTO: 0 /100
PLATELET # BLD AUTO: 150 10E9/L (ref 150–450)
POTASSIUM SERPL-SCNC: 3.9 MMOL/L (ref 3.4–5.3)
PSA SERPL-ACNC: 2.24 UG/L (ref 0–4)
RBC # BLD AUTO: 4.31 10E12/L (ref 4.4–5.9)
SODIUM SERPL-SCNC: 142 MMOL/L (ref 133–144)
TRIGL SERPL-MCNC: 35 MG/DL
WBC # BLD AUTO: 5 10E9/L (ref 4–11)

## 2018-08-28 ENCOUNTER — TELEPHONE (OUTPATIENT)
Dept: GASTROENTEROLOGY | Facility: OUTPATIENT CENTER | Age: 63
End: 2018-08-28

## 2018-08-28 DIAGNOSIS — K62.5 RECTAL BLEEDING: Primary | ICD-10-CM

## 2018-08-28 NOTE — TELEPHONE ENCOUNTER
Patient taking any blood thinners ? No    Heart disease ? History of Atrial fibrillation. Has had an ablation     Lung disease ? denies      Sleep apnea ?denies    Diabetic ?denies    Kidney disease ?denies    Dialysis ?n/a    Electronic implanted medical devices ? denies    Are you taking any narcotic pain medication ? No     What is your daily dosage ?    PTSD ? n/a    Prep instructions reviewed with patient ? patient declined review.  policy, MAC sedation plan reviewed. Advised patient to have someone stay with him post exam    Pharm[facility] : [facility]    Indication for procedure : Rectal bleeding [K62.5]     Referring provider :Joni Bangura MD     Arrival Time : 8;30 AM

## 2018-09-05 ENCOUNTER — DOCUMENTATION ONLY (OUTPATIENT)
Dept: GASTROENTEROLOGY | Facility: OUTPATIENT CENTER | Age: 63
End: 2018-09-05
Payer: COMMERCIAL

## 2018-09-05 ENCOUNTER — TRANSFERRED RECORDS (OUTPATIENT)
Dept: HEALTH INFORMATION MANAGEMENT | Facility: CLINIC | Age: 63
End: 2018-09-05

## 2018-09-07 LAB — COPATH REPORT: NORMAL

## 2019-01-02 ENCOUNTER — OFFICE VISIT (OUTPATIENT)
Dept: RHEUMATOLOGY | Facility: CLINIC | Age: 64
End: 2019-01-02
Attending: INTERNAL MEDICINE
Payer: COMMERCIAL

## 2019-01-02 VITALS
SYSTOLIC BLOOD PRESSURE: 143 MMHG | BODY MASS INDEX: 23.74 KG/M2 | HEIGHT: 70 IN | DIASTOLIC BLOOD PRESSURE: 93 MMHG | OXYGEN SATURATION: 97 % | TEMPERATURE: 98 F | HEART RATE: 73 BPM | WEIGHT: 165.8 LBS

## 2019-01-02 DIAGNOSIS — Z79.899 ENCOUNTER FOR LONG-TERM (CURRENT) USE OF MEDICATIONS: ICD-10-CM

## 2019-01-02 DIAGNOSIS — L40.50 PSORIATIC ARTHRITIS (H): Primary | ICD-10-CM

## 2019-01-02 PROCEDURE — G0463 HOSPITAL OUTPT CLINIC VISIT: HCPCS | Mod: ZF

## 2019-01-02 ASSESSMENT — MIFFLIN-ST. JEOR: SCORE: 1548.31

## 2019-01-02 ASSESSMENT — PAIN SCALES - GENERAL: PAINLEVEL: NO PAIN (0)

## 2019-01-02 NOTE — NURSING NOTE
"Chief Complaint   Patient presents with     RECHECK     1 year f/u Psoriatic arthritis     BP (!) 143/93   Pulse 73   Temp 98  F (36.7  C) (Oral)   Ht 1.778 m (5' 10\")   Wt 75.2 kg (165 lb 12.8 oz)   SpO2 97%   BMI 23.79 kg/m    Vera Alexandre CMA    "

## 2019-01-02 NOTE — LETTER
1/2/2019      RE: Prince Pereira  [address]  [facility] [address]       Rheumatology Visit     Prince Pereira MRN# [id number]   YOB: 1955 Age: 63 year old     Date of Visit: January 2, 2019  Primary care provider: Brendan Ndiaye          Assessment and Plan:   1. 64 yo male with 18+ year hx of Psoriatic Arthritis, well controlled on Enbrel. He did notice mild return of symptoms after about 7-8 weeks off drug in 2015 due to inguinal surgery complication. He is close to a clinical remission taking it every 7-10 days.    His mild increased hand pain in the summer interestingly improved with a lactose free diet. No inflammation on exam today.    2. Atrial Fibrillation, post ablation  3. Hx of Herpes Zoster, resolved.  4. Prior R temporal HA/facial pain, with negative evaluation including TA bx. This is resolved.  5. Change in bowel habits evaluated in 2019 here in [facility]    PLAN: discussed in detail with the patient    1. See lab orders, medication orders and follow-up plans for this encounter.   2. Additional treatment plan consists of continue Enbrel, renewed for one year  3. We discussed potential side effects   4. RTC: in 1 year(s)   5. Letter: no - lab current in August 2018    Tai Sanno MD            History of Present Illness:   64 yo  male with a past medical history of psoriatic arthritis and atrial fibrillation. He is here today for followup. I saw him for the first time in June 2012 and last in January 2018. EPIC Reviewed.    HISTORY CARRIED FORWARD:    To review, he was diagnosed with psoriatic arthritis 14+ years ago, and it has been well controlled with Enbrel  He manages to stretch his injections to once every 8-10 days, and he notes that the development of canker sores are reproducibly an indication to take his medication. He has not noticed any inflammation or irritation at the injection site. He is not experiencing any active  "inflammation or pain. The patient states that his range of motion is \"almost back to normal,\" with his continued use of Enbrel. He has some intermittent shoulder pain on R that is rotator cuff related, exacerbated by tennis.  Additionally, the patient has a past medical history of atrial fibrillation since 1988, that required recent cardioversion in February; he had a second episode days later that resolved with Flecanide. He was given flecainide to use if his symptoms return, but has not needed to use the medication to date. The patient currently takes metoprolol XL and aspirin for his atrial fibrillation. He also occasionally experiences reflux, and this has been managed with omeprazole once a day.   Overall, the patient has been quite pleased with his medication, and plans to continue using Enbrel to manage his psoriatic arthritis. He has had only a small patch of psoriasis on L shin now resolved almost with Triamcinolone.  He had a bout of herpes zoster in February 2013 that resolved. He had Valtrex and has no post herpetic pain. We discussed in the past the lack of consensus about risk vs. Benefit of Zostavax in patients on anti TNF therapy. He had no questions or concerns.   He saw Nina Smith late 2014 and then Dr. Pandey emergently in late December for question of possible GCA. CRP and ESR were normal. He did have a TA bx by Dr. Ramon Lagunas which was negative. His symptoms  gradually abated and were absent a year ago.  He had a hernia repair in 2015 with pre op physical by Dr. Ndiaye, and interval followup by Dr. Hawthorne regarding prior stable pulmonary nodules and does not require followup.  Unfortunately he had a post op hematoma after surgery that opened and had to heal by secondary intention.  He was off Enbrel for about 8 weeks due to this. He did note right at the end of that slight return of hand stiffness and pain and slightly more psoriasis. This came back under excellent control after " resuming and he is now close to asymptomatic taking the Enbrel on average every 10-14 days.    INTERVAL HISTORY:       Happily he has had largely another uneventful year.  No interval health problems. No infections.  He sees Cardiology yearly for management of his atrial fibrillation after ablation in .       He has remained on long term Enbrel >15 years without problems. He had mild increased hand pain in the summer interestingly improved with a lactose free diet.  He takes Enbrel every 7 to 10 days or so but after that will note some joint aching and mouth soreness.  No injection issues.     His mechanical foot issues are stable.  He had a R 5th metatarsal fracture due to running injury that healed.     Last year his only other concern was a mild change in bowel habits which sound irritable bowel in nature.  He had a colonoscopy at 54. He saw Dr. Bangura in  and had colonoscopy by Dr. Henderson.      He had lab work in August which was unremarkable.     He is happy to continue on Enbrel.        Review of Systems:   Review Of Systems  Skin: negative  Eyes: negative  Ears/Nose/Throat: negative  Respiratory: No shortness of breath, dyspnea on exertion, cough, or hemoptysis  Cardiovascular: negative  Gastrointestinal: negative  Genitourinary: negative  Musculoskeletal: see HPI  Neurologic: negative  Psychiatric: negative  Hematologic/Lymphatic/Immunologic: negative  Endocrine: negative          Past Medical History:     Past Medical History:   Diagnosis Date     Atrial fibrillation (H)     paroxysmal with ablation 2014     Family history of prostate cancer     father  age 60     Herpes zoster 3/26/2013     Almendarez's neuroma of right foot      Psoriasis      psoriatic arthritis      Pulmonary nodules 2014    multiple noncalcified up to 6 mm needs f/u       Patient Active Problem List    Diagnosis Date Noted     Herpes zoster      Priority: Medium     S/P ablation of atrial fibrillation 2014     Priority:  Medium     Pulmonary nodules 2014     Priority: Medium     multiple noncalcified up to 6 mm needs f/u       Psoriatic arthritis (H) 2012     Priority: Medium     Atrial fibrillation (H) 1987     Priority: Medium             Past Surgical History:     Past Surgical History:   Procedure Laterality Date     ANESTHESIA CARDIOVERSION  10/6/2011    Procedure:ANESTHESIA CARDIOVERSION; CARDIOVERSION; Surgeon:GENERIC ANESTHESIA PROVIDER; Location:[facility]     ANESTHESIA CARDIOVERSION  2/10/2012    Procedure:ANESTHESIA CARDIOVERSION; CARDIOVERSION; Surgeon:GENERIC ANESTHESIA PROVIDER; Location:[facility]     ANESTHESIA CARDIOVERSION  2013    Procedure: ANESTHESIA CARDIOVERSION;  Cardioversion;  Surgeon: Provider, Generic Anesthesia;  Location: [facility] OR     ANESTHESIA CARDIOVERSION  2013    Procedure: ANESTHESIA CARDIOVERSION;  Cardioversion;  Surgeon: Provider, Generic Anesthesia;  Location: [facility] OR     CATARACT IOL, RT/LT Left 2015     COLONOSCOPY       ENT SURGERY      tonsillectomy     HERNIORRHAPHY INGUINAL Right 7/15/2015    Procedure: HERNIORRHAPHY INGUINAL;  Surgeon: Bk Watts MD;  Location: [facility] OR     Left Atrial Wide Area Circumferential radiofrequency ablation  2014      for atrial fib     PHACOEMULSIFICATION CLEAR CORNEA WITH STANDARD INTRAOCULAR LENS IMPLANT Right 2015    Procedure: PHACOEMULSIFICATION CLEAR CORNEA WITH STANDARD INTRAOCULAR LENS IMPLANT;  Surgeon: Petr Moyer MD;  Location: [facility]             Social History:     Social History     Tobacco Use     Smoking status: Former Smoker     Years: 1.00     Types: Cigarettes     Last attempt to quit: 1980     Years since quittin.0     Smokeless tobacco: Never Used   Substance Use Topics     Alcohol use: Yes     Alcohol/week: 5.0 oz     Types: 10 Standard drinks or equivalent per week     Comment: occasional             Family History:     Family History   Problem Relation Age of Onset     C.A.D.  "Maternal Grandfather          age 63     Hypertension Maternal Grandfather      Hypertension Mother      Alzheimer Disease Mother      Prostate Cancer Father          age 60 agressive prostate ca     Alzheimer Disease Maternal Grandmother      Glaucoma No family hx of      Macular Degeneration No family hx of             Allergies:     Allergies   Allergen Reactions     No Clinical Screening - See Comments Rash     Cardioversion pads cause rash.             Medications:     Current Outpatient Medications   Medication Sig Dispense Refill     etanercept (ENBREL) 50 MG/ML injection Inject 0.98 mLs (50 mg) Subcutaneous once a week Prefilled syringeHold for signs of infection, and then seek medical attention. 4 Syringe 11     flecainide (TAMBOCOR) 100 MG tablet Take 1 tablet (100 mg) by mouth 2 times daily 6 tablet 0            Physical Exam:   Vital signs:  Temp: 98  F (36.7  C) Temp src: Oral BP: (!) 143/93 Pulse: 73     SpO2: 97 %     Height: 177.8 cm (5' 10\") Weight: 75.2 kg (165 lb 12.8 oz)  Estimated body mass index is 23.79 kg/m  as calculated from the following:    Height as of this encounter: 1.778 m (5' 10\").    Weight as of this encounter: 75.2 kg (165 lb 12.8 oz).  Constitutional: WD-WN-WG cooperative   Eyes: nl conjunctiva, sclera   ENT: nl external ears, nose, throat   No mucous membrane lesions, normal saliva pool   Neck: no mass or thyroid enlargement    Lymph: no cervical, supraclavicular, inguinal or epitrochlear nodes   MS: All TMJ, neck, shoulder, elbow, wrist, MCP/PIP/DIP, spine, hip, knee, ankle, and foot MTP/IP joints were examined and found normal.. No active synovitis or deformity. Full ROM. Normal  strength   Skin: no nail pitting, alopecia, rash, nodules. He has one patch of psoriasis on R lateral calf  Neuro: nl cranial nerves, strength   Psych: nl affect.       Data:     Lab Results   Component Value Date    WBC 5.0 2018    WBC 4.5 2015    WBC 6.4 2014    HGB " 13.3 08/20/2018    HGB 13.5 07/13/2015    HGB 13.8 11/18/2014    HCT 39.1 (L) 08/20/2018    HCT 39.2 (L) 07/13/2015    HCT 40.2 11/18/2014    MCV 91 08/20/2018    MCV 86 07/13/2015    MCV 87 11/18/2014     08/20/2018     07/13/2015     11/18/2014     Lab Results   Component Value Date    BUN 14 08/20/2018    BUN 14 07/13/2015    BUN 12 08/01/2014     No components found for: SEDRATE  Lab Results   Component Value Date    TSH 2.35 10/06/2011     Lab Results   Component Value Date    AST 26 08/01/2014    AST 26 11/06/2013    AST 24 04/11/2012    ALT 31 08/01/2014    ALT 30 11/06/2013    ALT 18 04/11/2012    ALKPHOS 77 08/01/2014    ALKPHOS 62 11/06/2013    ALKPHOS 59 04/11/2012     Reviewed Rheumatology lab flowsheet    Tai Sanon

## 2019-01-02 NOTE — PROGRESS NOTES
"Rheumatology Visit     Prince Pereira MRN# [id number]   YOB: 1955 Age: 63 year old     Date of Visit: January 2, 2019  Primary care provider: Brendan Ndiaye          Assessment and Plan:   1. 62 yo male with 18+ year hx of Psoriatic Arthritis, well controlled on Enbrel. He did notice mild return of symptoms after about 7-8 weeks off drug in 2015 due to inguinal surgery complication. He is close to a clinical remission taking it every 7-10 days.    His mild increased hand pain in the summer interestingly improved with a lactose free diet. No inflammation on exam today.    2. Atrial Fibrillation, post ablation  3. Hx of Herpes Zoster, resolved.  4. Prior R temporal HA/facial pain, with negative evaluation including TA bx. This is resolved.  5. Change in bowel habits evaluated in 2019 here in [facility]    PLAN: discussed in detail with the patient    1. See lab orders, medication orders and follow-up plans for this encounter.   2. Additional treatment plan consists of continue Enbrel, renewed for one year  3. We discussed potential side effects   4. RTC: in 1 year(s)   5. Letter: no - lab current in August 2018    Tai Sanon MD            History of Present Illness:   62 yo  male with a past medical history of psoriatic arthritis and atrial fibrillation. He is here today for followup. I saw him for the first time in June 2012 and last in January 2018. EPIC Reviewed.    HISTORY CARRIED FORWARD:    To review, he was diagnosed with psoriatic arthritis 14+ years ago, and it has been well controlled with Enbrel  He manages to stretch his injections to once every 8-10 days, and he notes that the development of canker sores are reproducibly an indication to take his medication. He has not noticed any inflammation or irritation at the injection site. He is not experiencing any active inflammation or pain. The patient states that his range of motion is \"almost back to normal,\" " with his continued use of Enbrel. He has some intermittent shoulder pain on R that is rotator cuff related, exacerbated by tennis.  Additionally, the patient has a past medical history of atrial fibrillation since 1988, that required recent cardioversion in February; he had a second episode days later that resolved with Flecanide. He was given flecainide to use if his symptoms return, but has not needed to use the medication to date. The patient currently takes metoprolol XL and aspirin for his atrial fibrillation. He also occasionally experiences reflux, and this has been managed with omeprazole once a day.   Overall, the patient has been quite pleased with his medication, and plans to continue using Enbrel to manage his psoriatic arthritis. He has had only a small patch of psoriasis on L shin now resolved almost with Triamcinolone.  He had a bout of herpes zoster in February 2013 that resolved. He had Valtrex and has no post herpetic pain. We discussed in the past the lack of consensus about risk vs. Benefit of Zostavax in patients on anti TNF therapy. He had no questions or concerns.   He saw Nina Smith late 2014 and then Dr. Pandey emergently in late December for question of possible GCA. CRP and ESR were normal. He did have a TA bx by Dr. Ramon Lagunas which was negative. His symptoms  gradually abated and were absent a year ago.  He had a hernia repair in 2015 with pre op physical by Dr. Ndiaye, and interval followup by Dr. Hawthorne regarding prior stable pulmonary nodules and does not require followup.  Unfortunately he had a post op hematoma after surgery that opened and had to heal by secondary intention.  He was off Enbrel for about 8 weeks due to this. He did note right at the end of that slight return of hand stiffness and pain and slightly more psoriasis. This came back under excellent control after resuming and he is now close to asymptomatic taking the Enbrel on average every 10-14  days.    INTERVAL HISTORY:       Happily he has had largely another uneventful year.  No interval health problems. No infections.  He sees Cardiology yearly for management of his atrial fibrillation after ablation in .       He has remained on long term Enbrel >15 years without problems. He had mild increased hand pain in the summer interestingly improved with a lactose free diet.  He takes Enbrel every 7 to 10 days or so but after that will note some joint aching and mouth soreness.  No injection issues.     His mechanical foot issues are stable.  He had a R 5th metatarsal fracture due to running injury that healed.     Last year his only other concern was a mild change in bowel habits which sound irritable bowel in nature.  He had a colonoscopy at 54. He saw Dr. Bangura in  and had colonoscopy by Dr. Henderson.      He had lab work in August which was unremarkable.     He is happy to continue on Enbrel.        Review of Systems:   Review Of Systems  Skin: negative  Eyes: negative  Ears/Nose/Throat: negative  Respiratory: No shortness of breath, dyspnea on exertion, cough, or hemoptysis  Cardiovascular: negative  Gastrointestinal: negative  Genitourinary: negative  Musculoskeletal: see HPI  Neurologic: negative  Psychiatric: negative  Hematologic/Lymphatic/Immunologic: negative  Endocrine: negative          Past Medical History:     Past Medical History:   Diagnosis Date     Atrial fibrillation (H)     paroxysmal with ablation 2014     Family history of prostate cancer     father  age 60     Herpes zoster 3/26/2013     Almendarez's neuroma of right foot      Psoriasis      psoriatic arthritis      Pulmonary nodules 2014    multiple noncalcified up to 6 mm needs f/u       Patient Active Problem List    Diagnosis Date Noted     Herpes zoster      Priority: Medium     S/P ablation of atrial fibrillation 2014     Priority: Medium     Pulmonary nodules 2014     Priority: Medium     multiple  noncalcified up to 6 mm needs f/u       Psoriatic arthritis (H) 2012     Priority: Medium     Atrial fibrillation (H) 1987     Priority: Medium             Past Surgical History:     Past Surgical History:   Procedure Laterality Date     ANESTHESIA CARDIOVERSION  10/6/2011    Procedure:ANESTHESIA CARDIOVERSION; CARDIOVERSION; Surgeon:GENERIC ANESTHESIA PROVIDER; Location:[facility]     ANESTHESIA CARDIOVERSION  2/10/2012    Procedure:ANESTHESIA CARDIOVERSION; CARDIOVERSION; Surgeon:GENERIC ANESTHESIA PROVIDER; Location:[facility]     ANESTHESIA CARDIOVERSION  2013    Procedure: ANESTHESIA CARDIOVERSION;  Cardioversion;  Surgeon: Provider, Generic Anesthesia;  Location: [facility]     ANESTHESIA CARDIOVERSION  2013    Procedure: ANESTHESIA CARDIOVERSION;  Cardioversion;  Surgeon: Provider, Generic Anesthesia;  Location: [facility] OR     CATARACT IOL, RT/LT Left 2015     COLONOSCOPY       ENT SURGERY      tonsillectomy     HERNIORRHAPHY INGUINAL Right 7/15/2015    Procedure: HERNIORRHAPHY INGUINAL;  Surgeon: Bk Watts MD;  Location: [facility] OR     Left Atrial Wide Area Circumferential radiofrequency ablation  2014      for atrial fib     PHACOEMULSIFICATION CLEAR CORNEA WITH STANDARD INTRAOCULAR LENS IMPLANT Right 2015    Procedure: PHACOEMULSIFICATION CLEAR CORNEA WITH STANDARD INTRAOCULAR LENS IMPLANT;  Surgeon: Petr Moyer MD;  Location: [facility]             Social History:     Social History     Tobacco Use     Smoking status: Former Smoker     Years: 1.00     Types: Cigarettes     Last attempt to quit: 1980     Years since quittin.0     Smokeless tobacco: Never Used   Substance Use Topics     Alcohol use: Yes     Alcohol/week: 5.0 oz     Types: 10 Standard drinks or equivalent per week     Comment: occasional             Family History:     Family History   Problem Relation Age of Onset     C.A.D. Maternal Grandfather          age 63     Hypertension Maternal  "Grandfather      Hypertension Mother      Alzheimer Disease Mother      Prostate Cancer Father          age 60 agressive prostate ca     Alzheimer Disease Maternal Grandmother      Glaucoma No family hx of      Macular Degeneration No family hx of             Allergies:     Allergies   Allergen Reactions     No Clinical Screening - See Comments Rash     Cardioversion pads cause rash.             Medications:     Current Outpatient Medications   Medication Sig Dispense Refill     etanercept (ENBREL) 50 MG/ML injection Inject 0.98 mLs (50 mg) Subcutaneous once a week Prefilled syringeHold for signs of infection, and then seek medical attention. 4 Syringe 11     flecainide (TAMBOCOR) 100 MG tablet Take 1 tablet (100 mg) by mouth 2 times daily 6 tablet 0            Physical Exam:   Vital signs:  Temp: 98  F (36.7  C) Temp src: Oral BP: (!) 143/93 Pulse: 73     SpO2: 97 %     Height: 177.8 cm (5' 10\") Weight: 75.2 kg (165 lb 12.8 oz)  Estimated body mass index is 23.79 kg/m  as calculated from the following:    Height as of this encounter: 1.778 m (5' 10\").    Weight as of this encounter: 75.2 kg (165 lb 12.8 oz).  Constitutional: [id number] cooperative   Eyes: nl conjunctiva, sclera   ENT: nl external ears, nose, throat   No mucous membrane lesions, normal saliva pool   Neck: no mass or thyroid enlargement    Lymph: no cervical, supraclavicular, inguinal or epitrochlear nodes   MS: All TMJ, neck, shoulder, elbow, wrist, MCP/PIP/DIP, spine, hip, knee, ankle, and foot MTP/IP joints were examined and found normal.. No active synovitis or deformity. Full ROM. Normal  strength   Skin: no nail pitting, alopecia, rash, nodules. He has one patch of psoriasis on R lateral calf  Neuro: nl cranial nerves, strength   Psych: nl affect.       Data:     Lab Results   Component Value Date    WBC 5.0 2018    WBC 4.5 2015    WBC 6.4 2014    HGB 13.3 2018    HGB 13.5 2015    HGB 13.8 2014    HCT " 39.1 (L) 08/20/2018    HCT 39.2 (L) 07/13/2015    HCT 40.2 11/18/2014    MCV 91 08/20/2018    MCV 86 07/13/2015    MCV 87 11/18/2014     08/20/2018     07/13/2015     11/18/2014     Lab Results   Component Value Date    BUN 14 08/20/2018    BUN 14 07/13/2015    BUN 12 08/01/2014     No components found for: SEDRATE  Lab Results   Component Value Date    TSH 2.35 10/06/2011     Lab Results   Component Value Date    AST 26 08/01/2014    AST 26 11/06/2013    AST 24 04/11/2012    ALT 31 08/01/2014    ALT 30 11/06/2013    ALT 18 04/11/2012    ALKPHOS 77 08/01/2014    ALKPHOS 62 11/06/2013    ALKPHOS 59 04/11/2012     Reviewed Rheumatology lab flowsheet    Tai Sanon

## 2019-03-04 ENCOUNTER — TELEPHONE (OUTPATIENT)
Dept: RHEUMATOLOGY | Facility: CLINIC | Age: 64
End: 2019-03-04

## 2019-11-02 ENCOUNTER — HEALTH MAINTENANCE LETTER (OUTPATIENT)
Age: 64
End: 2019-11-02

## 2020-01-15 DIAGNOSIS — L40.50 PSORIATIC ARTHRITIS (H): Primary | ICD-10-CM

## 2020-02-06 ENCOUNTER — TELEPHONE (OUTPATIENT)
Dept: RHEUMATOLOGY | Facility: CLINIC | Age: 65
End: 2020-02-06

## 2020-02-06 NOTE — TELEPHONE ENCOUNTER
Left message for pt reminding them of upcoming appointment.  Instructed pt to bring updated medications list.  anil roger

## 2020-02-06 NOTE — PROGRESS NOTES
"[facility]  Rheumatology Clinic  Steve Pandey MD  2020     Name: rPince Pereira  MRN: [id number]  Age: 64 year old  : 1955  Referring provider: Brendan Ndiaye     Assessment and Plan:  # Psoriatic arthritis (diagnosed before ; less than 5% body surface area psoriasis: [id number]; RX Enbrel since ):   The patient relates absence of inflammatory joint symptoms. He has occasional focal psoriasis, responsive to topical therapy. Exam shows early osteoarthritis changes in both hands, but no inflammatory joint changes.     Psoriatic arthritis remains completely suppressed on continuing etanercept monotherapy. I recommend etanercept 50 mg subcutaneous once weekly. He may use ibuprofen 400 to 600 mg as needed for occasional joint pain. Continue PRN topical therapy for mild psoriasis. Check CBC, creatinine, and LFTs today.    # L thumb swelling: likely represents synovial cyst. No treatment necessary.    Return to clinic in one year.     - Creatinine  - CRP inflammation  - CBC with platelets  - AST  - ALT  - etanercept (ENBREL) 50 MG/ML injection  Dispense: 4 Syringe; Refill: 11     Follow-up: Return in about 1 year (around 2021).     HPI:   Prince Chang is a 64 year old male with a history of psoriatic arthritis, psoriasis, and atrial fibrillation status post ablation who presents for follow up.     In the past, the patient has been following with Dr. Tai Sanon of Rheumatology service who last saw the patient on  2019. A summary of his history from Dr. Sanon can be noted in the history \"carried forward\" section noted below. During his last visit with Dr. Sanon the patient the patient had a largely uneventful year and has remained on long term Enbrel without incident taking Enbrel every 7 to 10 days. The patient did note symptoms that sounded to be irritable bowel in nature as well as right 5th metatarsal fracture than has since healed. He had no " other complaints. Plan was to follow up in one year and continue on Enbrel as prescribed.     Today,  the patient notes very occasional psoriasis back in the 1970s, but has very rare skin involvement in general with the exception of a patch that he applied hydrocortisone to behind his ear and behind his knee. The patient states that his joint symptoms started in 1999 with plantar fasciitis that progressed to hand, feet, and lateral hip pain. He states he started with Naproxen, moved on to Methotrexate topical, then oral methotrexate, and finally Enbrel. He states the Methotrexate did not really help his symptoms. However, he reports that the Enbrel has nearly resolved his symptoms and also seems to improve his canker sores on the buccal mucosa stating this is his warning sign to take Enbrel. He states he takes Enbrel every 7 to 10 days, and if he stretches beyond 10 days between Enbrel he notes returning joint pain. The patient denies red or dry eyes. He also denies chest discomfort though he does report mild congestion at times. He denies any injection site reactions. He denies limitations in physical activity. He notes nail changes in his toes likely due to fungus. The patient reports that he takes Naproxen as needed for unrelated pain, but does not need it for his joints.     He does report new redness and swelling in his left thumb new in the last 6 months. He states that it is always on the ulnar aspect of his left thumb. He notes it is unrelated to activity and seems to come and go though with palpation he does feel a tender spot. The patient is right handed and notes that in the past his right hand seems to be most effected.     The patient does note intermittent GI symptoms for the past 4 to 5 years with intermittent loose stools and diarrhea and then several days of constipation for which he was told to take fiber. He denies blood in the stool or abdominal pain. The patient denies a familial history of IBS.  "    HISTORY CARRIED FORWARD:  To review, he was diagnosed with psoriatic arthritis 14+ years ago, and it has been well controlled with Enbrel  He manages to stretch his injections to once every 8-10 days, and he notes that the development of canker sores are reproducibly an indication to take his medication. He has not noticed any inflammation or irritation at the injection site. He is not experiencing any active inflammation or pain. The patient states that his range of motion is \"almost back to normal,\" with his continued use of Enbrel. He has some intermittent shoulder pain on R that is rotator cuff related, exacerbated by tennis.  Additionally, the patient has a past medical history of atrial fibrillation since 1988, that required recent cardioversion in February; he had a second episode days later that resolved with Flecanide. He was given flecainide to use if his symptoms return, but has not needed to use the medication to date. The patient currently takes metoprolol XL and aspirin for his atrial fibrillation. He also occasionally experiences reflux, and this has been managed with omeprazole once a day.   Overall, the patient has been quite pleased with his medication, and plans to continue using Enbrel to manage his psoriatic arthritis. He has had only a small patch of psoriasis on L shin now resolved almost with Triamcinolone.  He had a bout of herpes zoster in February 2013 that resolved. He had Valtrex and has no post herpetic pain. We discussed in the past the lack of consensus about risk vs. Benefit of Zostavax in patients on anti TNF therapy. He had no questions or concerns.   He saw Nina Smith late 2014 and then Dr. Pandey emergently in late December for question of possible GCA. CRP and ESR were normal. He did have a TA bx by Dr. Ramon Lagunas which was negative. His symptoms  gradually abated and were absent a year ago.  He had a hernia repair in 2015 with pre op physical by Dr. Ndiaye, and " interval followup by Dr. Hawthorne regarding prior stable pulmonary nodules and does not require followup.  Unfortunately he had a post op hematoma after surgery that opened and had to heal by secondary intention.  He was off Enbrel for about 8 weeks due to this. He did note right at the end of that slight return of hand stiffness and pain and slightly more psoriasis. This came back under excellent control after resuming and he is now close to asymptomatic taking the Enbrel on average every 10-14 days.     Review of Systems:   Pertinent items are noted in HPI or as below, remainder of complete ROS is negative.      No recent problems with hearing or vision. No swallowing problems.   No breathing difficulty, shortness of breath, coughing, or wheezing  No chest pain or palpitations  No heart burn, indigestion, abdominal pain, nausea, vomiting  No urination problems, no bloody, cloudy urine, no dysuria  No numbing, tingling, weakness  No headaches or confusion  No rashes. No easy bleeding or bruising.     +intermittent diarrhea and constipation  +mild congestion  +left thumb tenderness  +canker sores    Active Medications:     Current Outpatient Medications:      etanercept (ENBREL) 50 MG/ML injection, Inject 0.98 mLs (50 mg) Subcutaneous once a week Prefilled syringeHold for signs of infection, and then seek medical attention., Disp: 4 Syringe, Rfl: 11     flecainide (TAMBOCOR) 100 MG tablet, Take 1 tablet (100 mg) by mouth 2 times daily, Disp: 6 tablet, Rfl: 0      Allergies:   No clinical screening - see comments      Past Medical History:  Atrial fibrillation   Herpes Zoster  Almendarez's neuroma of the right foot  Psoriasis   Psoriatic arthritis   Pulmonary nodules   Esophageal reflux  Enlargement of prostate benign      Past Surgical History:  Cardioversion 10/6/2011, 2/10/2012, 2/8/2013, 11/6/2013  Cataract IOL, right /left   Tonsillectomy  Colonoscopy  Herniorrhaphy inguinal right 7/15/2015  Left atrial wide area  "circumferential radiofrequency ablation 2/13/2014  Phacoemulsification clear cornea with standard intraocular lens implant 6/1/2015    Family History:   Maternal grandfather: coronary artery disease , hypertension   Mother: hypertension, alzheimer disease  Father: prostate cancer (age 60)      Social History:   Smoking Status: former smoker for 40 years   Smokeless Tobacco: Never Used  Alcohol Use: Yes   Occasional, 10 drink equivalents per week   Drug Use: No  PCP: Brendan Ndiaye   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      Physical Exam:   /88   Pulse 87   Temp 97.5  F (36.4  C) (Oral)   Ht 1.778 m (5' 10\")   Wt 74.4 kg (164 lb 1.6 oz)   SpO2 97%   BMI 23.55 kg/m     Wt Readings from Last 4 Encounters:   02/07/20 74.4 kg (164 lb 1.6 oz)   01/02/19 75.2 kg (165 lb 12.8 oz)   08/15/18 72.8 kg (160 lb 8 oz)   07/31/18 74.3 kg (163 lb 12.8 oz)     Constitutional: Well-developed, appearing stated age; cooperative  Eyes: Normal EOM, PERRLA, vision, conjunctiva, sclera  ENT: Normal external ears, nose, hearing, lips, teeth, gums, throat. No mucous membrane lesions, normal saliva pool  Neck: No mass or thyroid enlargement  Resp: Lungs clear to auscultation, nl to palpation  CV: RRR, no murmurs, rubs or gallops, no edema  GI: No ABD mass or tenderness, no HSM  : Not tested  Lymph: No cervical, supraclavicular, inguinal or epitrochlear nodes  MS: No swelling MCP and PIP. Some angulation in bilateral 5th DIP in both hands. Dorsal medial aspect of left 1st DIP is slightly prominent, firm swelling that appears focal. Excellent  strength. Excellent wrist range of motion bilaterally. Dorsal subluxation of multiple MTPS on the left. No evidence of swelling. No SI tenderness. The TMJ, neck, shoulder, elbow, wrist, spine, hip, knee, ankle, and foot MTP/IP joints were examined and found normal.   Skin: No nail pitting, alopecia, rash, nodules or lesions  Neuro: Normal cranial nerves, strength, " sensation, DTRs.   Psych: Normal judgement, orientation, memory, affect.     Laboratory:   RHEUM RESULTS Latest Ref Rng & Units 12/31/2014 7/13/2015 8/20/2018   SED RATE 0 - 20 mm/h 6 - -   CRP, INFLAMMATION 0.0 - 8.0 mg/L <2.9 - -   AST 0 - 45 U/L - - -   ALT 0 - 70 U/L - - -   ALBUMIN 3.3 - 4.9 g/dL - - -   WBC 4.0 - 11.0 10e9/L - 4.5 5.0   RBC 4.4 - 5.9 10e12/L - 4.54 4.31(L)   HGB 13.3 - 17.7 g/dL - 13.5 13.3   HCT 40.0 - 53.0 % - 39.2(L) 39.1(L)   MCV 78 - 100 fl - 86 91   MCHC 31.5 - 36.5 g/dL - 34.4 34.0   RDW 10.0 - 15.0 % - 12.7 12.2    - 450 10e9/L - 165 150   CREATININE 0.66 - 1.25 mg/dL - 0.86 0.89   GFR ESTIMATE, IF BLACK >60 mL/min/1.7m2 - >90  African American GFR Calc   >90   GFR ESTIMATE >60 mL/min/1.7m2 - 90 86   HEPATITIS C ANTIBODY NEG - - -     Scribe Disclosure:  Kathy TOMLINSON, am serving as a scribe to document services personally performed by Steve Pandey MD at this visit, based upon the provider's statements to me. All documentation has been reviewed by the aforementioned provider prior to being entered into the official medical record.

## 2020-02-07 ENCOUNTER — OFFICE VISIT (OUTPATIENT)
Dept: RHEUMATOLOGY | Facility: CLINIC | Age: 65
End: 2020-02-07
Attending: INTERNAL MEDICINE
Payer: COMMERCIAL

## 2020-02-07 VITALS
WEIGHT: 164.1 LBS | OXYGEN SATURATION: 97 % | DIASTOLIC BLOOD PRESSURE: 88 MMHG | BODY MASS INDEX: 23.49 KG/M2 | TEMPERATURE: 97.5 F | SYSTOLIC BLOOD PRESSURE: 133 MMHG | HEART RATE: 87 BPM | HEIGHT: 70 IN

## 2020-02-07 DIAGNOSIS — L40.50 PSORIATIC ARTHRITIS (H): ICD-10-CM

## 2020-02-07 DIAGNOSIS — Z79.899 ENCOUNTER FOR LONG-TERM (CURRENT) USE OF MEDICATIONS: Primary | ICD-10-CM

## 2020-02-07 PROCEDURE — G0463 HOSPITAL OUTPT CLINIC VISIT: HCPCS | Mod: ZF

## 2020-02-07 ASSESSMENT — PAIN SCALES - GENERAL: PAINLEVEL: NO PAIN (0)

## 2020-02-07 ASSESSMENT — MIFFLIN-ST. JEOR: SCORE: 1535.6

## 2020-02-07 NOTE — PATIENT INSTRUCTIONS
Diagnosis: psoriatic arthritis, quiescent  1. Osteoarthritis, hands, mild  2. Synovial cyst, L thumb    Plan:   - Continue Enbrel 50 mg weekly   - Repeat blood work CBC, creatinine, and LFTs today

## 2020-02-07 NOTE — NURSING NOTE
"Chief Complaint   Patient presents with     RECHECK     Psoriatic arthritis      /88   Pulse 87   Temp 97.5  F (36.4  C) (Oral)   Ht 1.778 m (5' 10\")   Wt 74.4 kg (164 lb 1.6 oz)   SpO2 97%   BMI 23.55 kg/m    Dot Guajardo, [facility]  2/7/2020 7:27 AM    "

## 2020-02-07 NOTE — LETTER
"2020      RE: Prince Pereira  [address]  [place] [address]       [facility]  Rheumatology Clinic  Steve Pandey MD  2020     Name: Prince Pereira  MRN: [id number]  Age: 64 year old  : 1955  Referring provider: Brendan Ndiaye     Assessment and Plan:  # Psoriatic arthritis (diagnosed before ; less than 5% body surface area psoriasis: [id number]; RX Enbrel since ):   The patient relates absence of inflammatory joint symptoms. He has occasional focal psoriasis, responsive to topical therapy. Exam shows early osteoarthritis changes in both hands, but no inflammatory joint changes.     Psoriatic arthritis remains completely suppressed on continuing etanercept monotherapy. I recommend etanercept 50 mg subcutaneous once weekly. He may use ibuprofen 400 to 600 mg as needed for occasional joint pain. Continue PRN topical therapy for mild psoriasis. Check CBC, creatinine, and LFTs today.    # L thumb swelling: likely represents synovial cyst. No treatment necessary.    Return to clinic in one year.     - Creatinine  - CRP inflammation  - CBC with platelets  - AST  - ALT  - etanercept (ENBREL) 50 MG/ML injection  Dispense: 4 Syringe; Refill: 11     Follow-up: Return in about 1 year (around 2021).     HPI:   Prince Chang is a 64 year old male with a history of psoriatic arthritis, psoriasis, and atrial fibrillation status post ablation who presents for follow up.     In the past, the patient has been following with Dr. Tai Sanon of Rheumatology service who last saw the patient on  2019. A summary of his history from Dr. Sanon can be noted in the history \"carried forward\" section noted below. During his last visit with Dr. Sanon the patient the patient had a largely uneventful year and has remained on long term Enbrel without incident taking Enbrel every 7 to 10 days. The patient did note symptoms that sounded to be " irritable bowel in nature as well as right 5th metatarsal fracture than has since healed. He had no other complaints. Plan was to follow up in one year and continue on Enbrel as prescribed.     Today,  the patient notes very occasional psoriasis back in the 1970s, but has very rare skin involvement in general with the exception of a patch that he applied hydrocortisone to behind his ear and behind his knee. The patient states that his joint symptoms started in 1999 with plantar fasciitis that progressed to hand, feet, and lateral hip pain. He states he started with Naproxen, moved on to Methotrexate topical, then oral methotrexate, and finally Enbrel. He states the Methotrexate did not really help his symptoms. However, he reports that the Enbrel has nearly resolved his symptoms and also seems to improve his canker sores on the buccal mucosa stating this is his warning sign to take Enbrel. He states he takes Enbrel every 7 to 10 days, and if he stretches beyond 10 days between Enbrel he notes returning joint pain. The patient denies red or dry eyes. He also denies chest discomfort though he does report mild congestion at times. He denies any injection site reactions. He denies limitations in physical activity. He notes nail changes in his toes likely due to fungus. The patient reports that he takes Naproxen as needed for unrelated pain, but does not need it for his joints.     He does report new redness and swelling in his left thumb new in the last 6 months. He states that it is always on the ulnar aspect of his left thumb. He notes it is unrelated to activity and seems to come and go though with palpation he does feel a tender spot. The patient is right handed and notes that in the past his right hand seems to be most effected.     The patient does note intermittent GI symptoms for the past 4 to 5 years with intermittent loose stools and diarrhea and then several days of constipation for which he was told to take  "fiber. He denies blood in the stool or abdominal pain. The patient denies a familial history of IBS.     HISTORY CARRIED FORWARD:  To review, he was diagnosed with psoriatic arthritis 14+ years ago, and it has been well controlled with Enbrel  He manages to stretch his injections to once every 8-10 days, and he notes that the development of canker sores are reproducibly an indication to take his medication. He has not noticed any inflammation or irritation at the injection site. He is not experiencing any active inflammation or pain. The patient states that his range of motion is \"almost back to normal,\" with his continued use of Enbrel. He has some intermittent shoulder pain on R that is rotator cuff related, exacerbated by tennis.  Additionally, the patient has a past medical history of atrial fibrillation since 1988, that required recent cardioversion in February; he had a second episode days later that resolved with Flecanide. He was given flecainide to use if his symptoms return, but has not needed to use the medication to date. The patient currently takes metoprolol XL and aspirin for his atrial fibrillation. He also occasionally experiences reflux, and this has been managed with omeprazole once a day.   Overall, the patient has been quite pleased with his medication, and plans to continue using Enbrel to manage his psoriatic arthritis. He has had only a small patch of psoriasis on L shin now resolved almost with Triamcinolone.  He had a bout of herpes zoster in February 2013 that resolved. He had Valtrex and has no post herpetic pain. We discussed in the past the lack of consensus about risk vs. Benefit of Zostavax in patients on anti TNF therapy. He had no questions or concerns.   He saw Nina Smith late 2014 and then Dr. Pandey emergently in late December for question of possible GCA. CRP and ESR were normal. He did have a TA bx by Dr. Ramon Lagunas which was negative. His symptoms  gradually abated and " were absent a year ago.  He had a hernia repair in 2015 with pre op physical by Dr. Ndiaye, and interval followup by Dr. Hawthorne regarding prior stable pulmonary nodules and does not require followup.  Unfortunately he had a post op hematoma after surgery that opened and had to heal by secondary intention.  He was off Enbrel for about 8 weeks due to this. He did note right at the end of that slight return of hand stiffness and pain and slightly more psoriasis. This came back under excellent control after resuming and he is now close to asymptomatic taking the Enbrel on average every 10-14 days.     Review of Systems:   Pertinent items are noted in HPI or as below, remainder of complete ROS is negative.      No recent problems with hearing or vision. No swallowing problems.   No breathing difficulty, shortness of breath, coughing, or wheezing  No chest pain or palpitations  No heart burn, indigestion, abdominal pain, nausea, vomiting  No urination problems, no bloody, cloudy urine, no dysuria  No numbing, tingling, weakness  No headaches or confusion  No rashes. No easy bleeding or bruising.     +intermittent diarrhea and constipation  +mild congestion  +left thumb tenderness  +canker sores    Active Medications:     Current Outpatient Medications:      etanercept (ENBREL) 50 MG/ML injection, Inject 0.98 mLs (50 mg) Subcutaneous once a week Prefilled syringeHold for signs of infection, and then seek medical attention., Disp: 4 Syringe, Rfl: 11     flecainide (TAMBOCOR) 100 MG tablet, Take 1 tablet (100 mg) by mouth 2 times daily, Disp: 6 tablet, Rfl: 0      Allergies:   No clinical screening - see comments      Past Medical History:  Atrial fibrillation   Herpes Zoster  Almendarez's neuroma of the right foot  Psoriasis   Psoriatic arthritis   Pulmonary nodules   Esophageal reflux  Enlargement of prostate benign      Past Surgical History:  Cardioversion 10/6/2011, 2/10/2012, 2/8/2013, 11/6/2013  Cataract IOL, right  "/left   Tonsillectomy  Colonoscopy  Herniorrhaphy inguinal right 7/15/2015  Left atrial wide area circumferential radiofrequency ablation 2/13/2014  Phacoemulsification clear cornea with standard intraocular lens implant 6/1/2015    Family History:   Maternal grandfather: coronary artery disease , hypertension   Mother: hypertension, alzheimer disease  Father: prostate cancer (age 60)      Social History:   Smoking Status: former smoker for 40 years   Smokeless Tobacco: Never Used  Alcohol Use: Yes   Occasional, 10 drink equivalents per week   Drug Use: No  PCP: Brendan Ndiaye   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      Physical Exam:   /88   Pulse 87   Temp 97.5  F (36.4  C) (Oral)   Ht 1.778 m (5' 10\")   Wt 74.4 kg (164 lb 1.6 oz)   SpO2 97%   BMI 23.55 kg/m      Wt Readings from Last 4 Encounters:   02/07/20 74.4 kg (164 lb 1.6 oz)   01/02/19 75.2 kg (165 lb 12.8 oz)   08/15/18 72.8 kg (160 lb 8 oz)   07/31/18 74.3 kg (163 lb 12.8 oz)     Constitutional: Well-developed, appearing stated age; cooperative  Eyes: Normal EOM, PERRLA, vision, conjunctiva, sclera  ENT: Normal external ears, nose, hearing, lips, teeth, gums, throat. No mucous membrane lesions, normal saliva pool  Neck: No mass or thyroid enlargement  Resp: Lungs clear to auscultation, nl to palpation  CV: RRR, no murmurs, rubs or gallops, no edema  GI: No ABD mass or tenderness, no HSM  : Not tested  Lymph: No cervical, supraclavicular, inguinal or epitrochlear nodes  MS: No swelling MCP and PIP. Some angulation in bilateral 5th DIP in both hands. Dorsal medial aspect of left 1st DIP is slightly prominent, firm swelling that appears focal. Excellent  strength. Excellent wrist range of motion bilaterally. Dorsal subluxation of multiple MTPS on the left. No evidence of swelling. No SI tenderness. The TMJ, neck, shoulder, elbow, wrist, spine, hip, knee, ankle, and foot MTP/IP joints were examined and found normal.   Skin: " No nail pitting, alopecia, rash, nodules or lesions  Neuro: Normal cranial nerves, strength, sensation, DTRs.   Psych: Normal judgement, orientation, memory, affect.     Laboratory:   RHEUM RESULTS Latest Ref Rng & Units 12/31/2014 7/13/2015 8/20/2018   SED RATE 0 - 20 mm/h 6 - -   CRP, INFLAMMATION 0.0 - 8.0 mg/L <2.9 - -   AST 0 - 45 U/L - - -   ALT 0 - 70 U/L - - -   ALBUMIN 3.3 - 4.9 g/dL - - -   WBC 4.0 - 11.0 10e9/L - 4.5 5.0   RBC 4.4 - 5.9 10e12/L - 4.54 4.31(L)   HGB 13.3 - 17.7 g/dL - 13.5 13.3   HCT 40.0 - 53.0 % - 39.2(L) 39.1(L)   MCV 78 - 100 fl - 86 91   MCHC 31.5 - 36.5 g/dL - 34.4 34.0   RDW 10.0 - 15.0 % - 12.7 12.2    - 450 10e9/L - 165 150   CREATININE 0.66 - 1.25 mg/dL - 0.86 0.89   GFR ESTIMATE, IF BLACK >60 mL/min/1.7m2 - >90  African American GFR Calc   >90   GFR ESTIMATE >60 mL/min/1.7m2 - 90 86   HEPATITIS C ANTIBODY NEG - - -     Scribe Disclosure:  IKathy, am serving as a scribe to document services personally performed by Steve Pandey MD at this visit, based upon the provider's statements to me. All documentation has been reviewed by the aforementioned provider prior to being entered into the official medical record.      Steve Pandey MD

## 2020-02-10 ENCOUNTER — HEALTH MAINTENANCE LETTER (OUTPATIENT)
Age: 65
End: 2020-02-10

## 2020-05-23 ENCOUNTER — TELEPHONE (OUTPATIENT)
Dept: CARDIOLOGY | Facility: CLINIC | Age: 65
End: 2020-05-23

## 2020-05-23 NOTE — TELEPHONE ENCOUNTER
Prince Pereira is a 61 year old male.  He is a psychiatrist.  His past medical history includes PVI for atrial fibrillation 2/2014, pulmonary nodules (followed by pulmonary nodule clinic), GERD, psoriasis and psoriasis arthritis.  He has had paroxsymal atrial fibrillation for the past 25 years with 10+ DCCV and a recent PVI on 2/2014 which was complicated with a 8 day stay in the hospital for femoral hematoma.  Prior to his PVI he was  taking flecainide as a pill in a pocket approach.     Patient thinks he is back on afib , his HR ~120s. He is on vacation in his cabin and he was feeling more tired today. No chest pain, sob, palpitations , no dizziness. Patient does not know his BP. I advised him to go to the ED for evaluation however he is far away from nearest ED. I advised him not to take flecainide he has in his pocket, I told him it would be ok to take metoprolol 25 mg XL for now. I advised if he has worsening symptoms to visit the ED. If he has no discomfort he would need to follow up with EP as an outpatient for discussion about starting anticoagulation as CHADSVASC would be at least 1 and also discuss about options for rhythm control which was his preferred strategy in the past (DCCVs and PVI).

## 2020-06-02 ENCOUNTER — VIRTUAL VISIT (OUTPATIENT)
Dept: INTERNAL MEDICINE | Facility: CLINIC | Age: 65
End: 2020-06-02
Payer: COMMERCIAL

## 2020-06-02 DIAGNOSIS — J31.2 CHRONIC SORE THROAT: Primary | ICD-10-CM

## 2020-06-02 RX ORDER — OMEPRAZOLE 40 MG/1
40 CAPSULE, DELAYED RELEASE ORAL DAILY
Qty: 60 CAPSULE | Refills: 0 | Status: SHIPPED | OUTPATIENT
Start: 2020-06-02 | End: 2020-08-01

## 2020-06-02 NOTE — PATIENT INSTRUCTIONS
- take omeprazole 40 mg once daily  - if symptoms do not improve after 1 month would follow-up with PCP or ENT (referral placed)  - if develop fevers, difficulty swallowing, neck lump, difficulty breathing you should see a doctor

## 2020-06-02 NOTE — PROGRESS NOTES
"Prince Pereira is a 64 year old male who is being evaluated via a billable video visit.      The patient has been notified of following:     \"This video visit will be conducted via a call between you and your physician/provider. We have found that certain health care needs can be provided without the need for an in-person physical exam.  This service lets us provide the care you need with a video conversation.  If a prescription is necessary we can send it directly to your pharmacy.  If lab work is needed we can place an order for that and you can then stop by our lab to have the test done at a later time.    Video visits are billed at different rates depending on your insurance coverage.  Please reach out to your insurance provider with any questions.    If during the course of the call the physician/provider feels a video visit is not appropriate, you will not be charged for this service.\"    Patient has given verbal consent for Video visit? Yes    How would you like to obtain your AVS? MyChart    Patient would like the video invitation sent by: email    Will anyone else be joining your video visit? No      Subjective     Prince Pereira is a 64 year old male who presents today via video visit for the following health issues:    HPI     Patient reports 2.5 months of sore throat.     Had sore throat, headache, congestion started on March 12th. No cough or fever.  Took a sick day from work (wouldn't normally stay home given symptoms however given concern for COVID stayed home).  Symptoms have never gone completely away.  Often had sore throat and headache, occasional chills, congestion. Never fever or cough.  Most of that has cleared by early April but the recurring sore throat has persisted.  He will wake up and it will be fine then through the course of the day with talking will feel sore and have a \"filmy\" feeling in his throat with some discomfort in the throat less in the morning and more " "as the day progresses.  Is getting a bit worried. Discomfort worse when he swallows. No bleeding, no difficulty swallowing food. Feels voice is a little bit hoarse. Denies lump in neck.      No history of allergies.  Thought about taking loratadine and see if things can change.        Never had anything like this happen before.  No fevers, no nasal congestion, no chest pain or feeling short of breath.  Has felt a bit lower in energy. Weight has been stable, no night sweats.     Has worked entirely from home with the last 2 months.  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      Stopped taking embril for 5-6 weeks because was worried about COVID.  On this for psoriatic arthritis. Started taking it again towards end of April.     Has looked and hasn't seen anything abnormal. No tender lymph nodes.      Lives with wife and adult son home currently.  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 Was tested for COVID after exposure in late March but tested negative.  In late March was sick for several days with congestion/cough. Wife has been fine.     Does not smoke.  Smoked in college years a long time ago (total 1/2 pack year).  Nothing else inhaled denies illicits.      Had a spontaneous episode of afib last week and self-converted a few times later. Has follow-up with cardiology upcoming.     Have had reflux symptoms and used to take omeprazole OTC a few years ago.  Now only takes tums once in a while.     Reviewed and updated as needed this visit by Provider         Review of Systems   Comprehensive 10 point ROS negative unless listed above      Objective    There were no vitals taken for this visit.  Estimated body mass index is 23.55 kg/m  as calculated from the following:    Height as of 2/7/20: 1.778 m (5' 10\").    Weight as of 2/7/20: 74.4 kg (164 lb 1.6 oz).  Physical Exam     GENERAL: Healthy, alert and no distress  EYES: Eyes grossly normal to inspection.  No " discharge or erythema, or obvious scleral/conjunctival abnormalities.  RESP: No audible wheeze, cough, or visible cyanosis.  No visible retractions or increased work of breathing.    SKIN: Visible skin clear. No significant rash, abnormal pigmentation or lesions.  NEURO: Cranial nerves grossly intact.  Mentation and speech appropriate for age.  PSYCH: Mentation appears normal, affect normal/bright, judgement and insight intact, normal speech and appearance well-groomed.            Assessment & Plan     1. Chronic sore throat  Patient reports 2.5 months of chronic sore throat and low energy without fever, cough, shortness of breath.  Consider silent reflux causing throat irritation, will trial omeprazole. Low suspicion for strep throat or mono given lack adenopathy or lack of reported tonsillar exudates per patient. Patient without red flag symptoms at this time (no difficulty swallowing, neck mass, adenopathy, fevers, weight loss, night sweats).  Given patient age and chronicity of symptoms would not want to miss underlying malignancy if symptoms do not improve. Given ENT referral for patient to follow-up with if symptoms worsen or do not improve with omeprazole.   - omeprazole (PRILOSEC) 40 MG DR capsule; Take 1 capsule (40 mg) by mouth daily  Dispense: 60 capsule; Refill: 0  - OTOLARYNGOLOGY REFERRAL     Return in about 4 weeks (around 6/30/2020) for Video Visit.    Plan of care discussed with Dr. Lanier.     Debbie Antonio MD PhD   Internal Medicine, PGY-3  [phone]     Video-Visit Details    Type of service:  Video Visit    Video Start Time: 8:00 am   Video End Time: 8:40 am     Originating Location (pt. Location): Home    Distant Location (provider location):  [facility]     Platform used for Video Visit: [facility]    Teaching Physician Note:    I, Dr. Lanier, was with the resident on the virtual visit for the critical and key portions on the visit. I agree with the resident's findings and  plan of care as documented in the resident's note.     Peggy Lanier M.D.  Internal Medicine  Primary Care Center   pager [phone]

## 2020-06-02 NOTE — NURSING NOTE
"Prince Pereira is a 64 year old male who is being evaluated via a billable video visit.      The patient has been notified of following:     \"This video visit will be conducted via a call between you and your physician/provider. We have found that certain health care needs can be provided without the need for an in-person physical exam.  This service lets us provide the care you need with a video conversation.  If a prescription is necessary we can send it directly to your pharmacy.  If lab work is needed we can place an order for that and you can then stop by our lab to have the test done at a later time.    Video visits are billed at different rates depending on your insurance coverage.  Please reach out to your insurance provider with any questions.    If during the course of the call the physician/provider feels a video visit is not appropriate, you will not be charged for this service.\"    Patient has given verbal consent for Video visit? Yes    How would you like to obtain your AVS? Peyton    Patient would like the video invitation sent by: Send to e-mail at: [email or web address]    Will anyone else be joining your video visit? No          "

## 2020-06-05 NOTE — TELEPHONE ENCOUNTER
RECORDS RECEIVED FROM: internal    DATE RECEIVED: 6.9.20    NOTES STATUS DETAILS   OFFICE NOTE from referring provider    n/a Per appt notes self referred    OFFICE NOTE from other cardiologist    Jennifer ramachandran, nick quan,    DISCHARGE SUMMARY from hospital    n/a    DISCHARGE REPORT from the ER   n/a    OPERATIVE REPORT    n/a    MEDICATION LIST   n/a    LABS     BMP   Internal  8/20/18   CBC   Internal  2015   CMP   Internal  2014   Lipids   Internal  8/20/18   TSH   n/a    DIAGNOSTIC PROCEDURES     EKG   n/a    Monitor Reports   n/a    IMAGING (DISC & REPORT)      Echo   n/a    Stress Tests   n/a    Cath   n/a    MRI/MRA   n/a    CT/CTA   n/a       Action 6.5.20 sv   Action Taken Pt was seen last 3 years ago

## 2020-06-09 ENCOUNTER — VIRTUAL VISIT (OUTPATIENT)
Dept: CARDIOLOGY | Facility: CLINIC | Age: 65
End: 2020-06-09
Attending: INTERNAL MEDICINE
Payer: COMMERCIAL

## 2020-06-09 ENCOUNTER — PRE VISIT (OUTPATIENT)
Dept: CARDIOLOGY | Facility: CLINIC | Age: 65
End: 2020-06-09

## 2020-06-09 DIAGNOSIS — I48.0 PAROXYSMAL ATRIAL FIBRILLATION (H): Primary | ICD-10-CM

## 2020-06-09 PROCEDURE — 99203 OFFICE O/P NEW LOW 30 MIN: CPT | Mod: 95 | Performed by: INTERNAL MEDICINE

## 2020-06-09 ASSESSMENT — PAIN SCALES - GENERAL: PAINLEVEL: NO PAIN (0)

## 2020-06-09 NOTE — PROGRESS NOTES
"Electrophysiology Clinic Video Virtual Visit    Prince Pereira is a 64 year old male who is being evaluated via a billable video visit.      The patient has been notified of following:     \"This video visit will be conducted via a call between you and your physician/provider. We have found that certain health care needs can be provided without the need for an in-person physical exam.  This service lets us provide the care you need with a video conversation.  If a prescription is necessary we can send it directly to your pharmacy.  If lab work is needed we can place an order for that and you can then stop by our lab to have the test done at a later time.    If during the course of the call the physician/provider feels a video visit is not appropriate, you will not be charged for this service.\"     Physician has received verbal consent for a Video Visit from the patient? Yes    Patient would like the video invitation sent by: Send to e-mail at: [email or web address]    Video Start Time: 11:33    Video Stop Time: 12:03    Mode of Communication:  Video Conference via [employer]    [facility] Location (pt. 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 He was last seen in cardiology clinic by Dr. Char Rodriguez on 11/3/2014. He underwent pulmonary vein isolation for AF on 2/13/2014 and warfarin/metoprolol were discontinued 4/2014. He's had two episodes of recurrent AF since 2014: one in setting of severe back pain lasted 4 hours May 2017; the other one recently on l 5/23/2020 when he was on vacation: it was the morning, he had been running pretty hard back and forth from his cabin to the boat, carrying fishing gear, then had half a bloody lex: he thinks AF started with all the running and not with the bloody lex. He checked his pulse and it was irregular at about 120 bpm, lasted 2-3 hours total. He had called the on call team " after an hour of AF; he had some metoprolol XL 25 mg and flecainide from 2017, took one of metoprolol. During the time he was in AF he continued to complete his morning activities. Other than irregular and fullness when he's in afib, he has no associated chest discomfort, dyspnea, dizziness.    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 Plays tennis regularly without getting AF. He has recently bought a BP cuff and reports usual home BP around 140/90.      PAST MEDICAL HISTORY:  1. Atrial fibrillation, first episode , s/p PVI 2014, prolonged hospital stay due to femoral hematoma. Multiple cardioversions prior to PVI. Recurrent  and May 2020, each time 2-4 hours.  2. Pulmonary nodules  3. 4. Psoriatic arthritis  Herpes zoster    CURRENT MEDICATIONS:  Omeprazole 40 qd  Enbrel injections    ALLERGIES:     Allergies   Allergen Reactions     No Clinical Screening - See Comments Rash     Cardioversion pads cause rash.       FAMILY HISTORY:  Family History   Problem Relation Age of Onset     C.A.D. Maternal Grandfather          age 63     Hypertension Maternal Grandfather      Hypertension Mother      Alzheimer Disease Mother      Prostate Cancer Father          age 60 agressive prostate ca     Alzheimer Disease Maternal Grandmother      Glaucoma No family hx of      Macular Degeneration No family hx of        SOCIAL HISTORY:  Social History     Tobacco Use     Smoking status: Former Smoker     Years: 1.00     Types: Cigarettes     Last attempt to quit: 1980     Years since quittin.4     Smokeless tobacco: Never Used   Substance Use Topics     Alcohol use: Yes     Alcohol/week: 8.3 standard drinks     Types: 10 Standard drinks or equivalent per week     Comment: occasional     Drug use: No       ROS:  10 point ROS neg other than the symptoms noted above in the HPI.    Labs:  2018 - cr 0.89    Testing/Procedures:  ECHOCARDIOGRAM:  2014: normal EF normal atria    EKG:  5/15/2017: sinus 70 bpm, normal intervals    Exam:  The rest of a comprehensive physical examination is deferred due to public health emergency video visit restrictions.  CONSITUTIONAL: no acute distress  HEENT: no icterus, no redness or discharge, neck supple  CV: no visible edema of visualized extremities. No JVD.   RESPIRATORY: respirations nonlabored, no cough  NEURO: AA&Ox3, speech fluent/appropriate, motor grossly nonfocal  PSYCH: cooperative, affect appropriate  DERM: no rashes on visualized face/neck/upper extremities      Assessment and Plan:   1. Atrial fibrillation, formerly persistent, s/p PVI 2014, only 2 episodes since, each spontaneously terminated after 2-4 hours. He is mildly symptomatic. [id number] score currently 1 for age 64, although he is likely developing hypertension. Discussed management strategies with him, including offering him reassurance for 2 episodes in the last 6 years. Recommend keeping track of overall burden - if increase in frequency or duration and associated with significant symptoms, options are to start an antiarrhythmic drug or proceed with repeat ablation. Discussed with him management if he should develop persistent AF over 24 hours: we can cardiovert him within 48 hours without anticoagulation since he is reliable as to when AF starts.  2. BP control. He is scheduled to see PCP next week. If home BP consistently 140/90 as he is reporting, suggest aggressive therapy which will help in reducing future AF burden. Metoprolol would be a good agent as it can reduce BP and control AF rate. Diagnosis of HTN would increase his [id number] score to 2, and long term anticoagulation should be considered.      In addition to video time documented above, I spent an additional 15 minutes on data review and documentation.    I have reviewed the note as documented above.  This accurately captures the substance of my virtual visit with the patient. The patient states understanding and  is agreeable with the plan.     Vera Rodriguez MD  Cardiology        CC  SELF, REFERRED

## 2020-06-09 NOTE — PROGRESS NOTES
"Prince Pereira is a 64 year old male who is being evaluated via a billable video visit.      The patient has been notified of following:     \"This video visit will be conducted via a call between you and your physician/provider. We have found that certain health care needs can be provided without the need for an in-person physical exam.  This service lets us provide the care you need with a video conversation.  If a prescription is necessary we can send it directly to your pharmacy.  If lab work is needed we can place an order for that and you can then stop by our lab to have the test done at a later time.    Video visits are billed at different rates depending on your insurance coverage.  Please reach out to your insurance provider with any questions.    If during the course of the call the physician/provider feels a video visit is not appropriate, you will not be charged for this service.\"    Patient has given verbal consent for Video visit? Yes    How would you like to obtain your AVS? Peyton    Patient would like the video invitation sent by: Send to e-mail at: [email or web address]    Will anyone else be joining your video visit? No      Medications were reconciled.     Henrietta Wright CMA    11:27 AM                "

## 2020-06-09 NOTE — LETTER
"6/9/2020      RE: Prince Pereira  [address]  [facility] [address]       Dear Colleague,    Thank you for the opportunity to participate in the care of your patient, Prince Pereira, at the [facility]. Please see a copy of my visit note below.    Prince Pereira is a 64 year old male who is being evaluated via a billable video visit.        Electrophysiology Clinic Video Virtual Visit    Prince Pereira is a 64 year old male who is being evaluated via a billable video visit.      The patient has been notified of following:     \"This video visit will be conducted via a call between you and your physician/provider. We have found that certain health care needs can be provided without the need for an in-person physical exam.  This service lets us provide the care you need with a video conversation.  If a prescription is necessary we can send it directly to your pharmacy.  If lab work is needed we can place an order for that and you can then stop by our lab to have the test done at a later time.    If during the course of the call the physician/provider feels a video visit is not appropriate, you will not be charged for this service.\"     Physician has received verbal consent for a Video Visit from the patient? Yes    Patient would like the video invitation sent by: Send to e-mail at: [email or web address]    Video Start Time: 11:33    Video Stop Time: 12:03    Mode of Communication:  Video Conference via Epuls    Originating Location (pt. 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 Char Rodriguez on 11/3/2014. He underwent pulmonary vein isolation for AF on 2/13/2014 and warfarin/metoprolol were discontinued 4/2014. He's had two episodes of recurrent AF since 2014: one in setting " of severe back pain lasted 4 hours May 2017; the other one recently on l 2020 when he was on vacation: it was the morning, he had been running pretty hard back and forth from his cabin to the boat, carrying fishing gear, then had half a bloody lex: he thinks AF started with all the running and not with the bloody lex. He checked his pulse and it was irregular at about 120 bpm, lasted 2-3 hours total. He had called the on call team after an hour of AF; he had some metoprolol XL 25 mg and flecainide from , took one of metoprolol. During the time he was in AF he continued to complete his morning activities. Other than irregular and fullness when he's in afib, he has no associated chest discomfort, dyspnea, dizziness.    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 Plays tennis regularly without getting AF. He has recently bought a BP cuff and reports usual home BP around 140/90.      PAST MEDICAL HISTORY:  1. Atrial fibrillation, first episode , s/p PVI 2014, prolonged hospital stay due to femoral hematoma. Multiple cardioversions prior to PVI. Recurrent  and May 2020, each time 2-4 hours.  2. Pulmonary nodules  3. 4. Psoriatic arthritis  Herpes zoster    CURRENT MEDICATIONS:  Omeprazole 40 qd  Enbrel injections    ALLERGIES:     Allergies   Allergen Reactions     No Clinical Screening - See Comments Rash     Cardioversion pads cause rash.       FAMILY HISTORY:  Family History   Problem Relation Age of Onset     C.A.D. Maternal Grandfather          age 63     Hypertension Maternal Grandfather      Hypertension Mother      Alzheimer Disease Mother      Prostate Cancer Father          age 60 agressive prostate ca     Alzheimer Disease Maternal Grandmother      Glaucoma No family hx of      Macular Degeneration No family hx of        SOCIAL HISTORY:  Social History     Tobacco Use     Smoking status: Former Smoker     Years: 1.00     Types: Cigarettes     Last attempt to  quit: 1980     Years since quittin.4     Smokeless tobacco: Never Used   Substance Use Topics     Alcohol use: Yes     Alcohol/week: 8.3 standard drinks     Types: 10 Standard drinks or equivalent per week     Comment: occasional     Drug use: No       ROS:  10 point ROS neg other than the symptoms noted above in the HPI.    Labs:  2018 - cr 0.89    Testing/Procedures:  ECHOCARDIOGRAM:  2014: normal EF normal atria    EK/15/2017: sinus 70 bpm, normal intervals    Exam:  The rest of a comprehensive physical examination is deferred due to public health emergency video visit restrictions.  CONSITUTIONAL: no acute distress  HEENT: no icterus, no redness or discharge, neck supple  CV: no visible edema of visualized extremities. No JVD.   RESPIRATORY: respirations nonlabored, no cough  NEURO: AA&Ox3, speech fluent/appropriate, motor grossly nonfocal  PSYCH: cooperative, affect appropriate  DERM: no rashes on visualized face/neck/upper extremities      Assessment and Plan:   1. Atrial fibrillation, formerly persistent, s/p PVI , only 2 episodes since, each spontaneously terminated after 2-4 hours. He is mildly symptomatic. [id number] score currently 1 for age 64, although he is likely developing hypertension. Discussed management strategies with him, including offering him reassurance for 2 episodes in the last 6 years. Recommend keeping track of overall burden - if increase in frequency or duration and associated with significant symptoms, options are to start an antiarrhythmic drug or proceed with repeat ablation. Discussed with him management if he should develop persistent AF over 24 hours: we can cardiovert him within 48 hours without anticoagulation since he is reliable as to when AF starts.  2. BP control. He is scheduled to see PCP next week. If home BP consistently 140/90 as he is reporting, suggest aggressive therapy which will help in reducing future AF burden. Metoprolol would be a  good agent as it can reduce BP and control AF rate. Diagnosis of HTN would increase his [id number]-[id number] score to 2, and long term anticoagulation should be considered.      In addition to video time documented above, I spent an additional 15 minutes on data review and documentation.    I have reviewed the note as documented above.  This accurately captures the substance of my virtual visit with the patient. The patient states understanding and is agreeable with the plan.     Vera Rodriguez MD  Cardiology      Please do not hesitate to contact me if you have any questions/concerns.     Sincerely,     Vera Rodriguez MD

## 2020-06-09 NOTE — PATIENT INSTRUCTIONS
"You were evaluated today in the Cardiovascular Telemedicine Clinic at the [facility].     Cardiology Provider during your visit: Dr. Vera Rodriguez    Diagnosis:  Atrial fibrillation    Results: discussed with patient    Orders:   None    Medication Changes:   None    Recommendations:   Keep track of blood pressure - may need to treat.  High blood pressure and your age of 64 increases your risk of stroke to 2-3% per year, and anticoagulation with warfarin or Eliquis and equivalent should be consider.      Follow-up:   Please contact me with further atrial fibrillation episodes.      Please follow up with primary care provider for medication refills         Please feel free to call me with any questions or concerns.       Flor Carbajal RN     Questions and [phone].   First press #1 for the [employer] and then press #3 for \"Medical Questions\" to reach us Cardiology Nurses.      On Call Cardiologist for after hours or on weekends: [phone]   option #4 and ask to speak to the on-call Cardiologist.          If you need a medication refill please contact your pharmacy.  Please allow 3 business days for your refill to be completed.   "

## 2020-07-06 ENCOUNTER — VIRTUAL VISIT (OUTPATIENT)
Dept: INTERNAL MEDICINE | Facility: CLINIC | Age: 65
End: 2020-07-06
Payer: COMMERCIAL

## 2020-07-06 ENCOUNTER — TELEPHONE (OUTPATIENT)
Dept: INTERNAL MEDICINE | Facility: CLINIC | Age: 65
End: 2020-07-06

## 2020-07-06 DIAGNOSIS — J02.9 SORE THROAT: Primary | ICD-10-CM

## 2020-07-06 RX ORDER — LORATADINE 10 MG/1
10 TABLET ORAL DAILY
COMMUNITY
End: 2021-01-14

## 2020-07-06 ASSESSMENT — PAIN SCALES - GENERAL: PAINLEVEL: MILD PAIN (2)

## 2020-07-06 NOTE — NURSING NOTE
Chief Complaint   Patient presents with     Pharyngitis     Follow up on sore throat. Symproms are the same.      Depression Response    Patient completed the PHQ-9 assessment for depression and scored >9? Yes  Question 9 on the PHQ-9 was positive for suicidality? No    I personally notified the following: visit provider      GINI Everett 7:37 AM  7/6/2020

## 2020-07-06 NOTE — PROGRESS NOTES
Phone visit for 3-4 month history of sore and scratchy throat resistant to omeprazole and anti-histamines.  Needs ENT evaluation and referral placed.  Brendan Ndiaye MD

## 2020-07-07 ENCOUNTER — TELEPHONE (OUTPATIENT)
Dept: OTOLARYNGOLOGY | Facility: CLINIC | Age: 65
End: 2020-07-07

## 2020-07-07 NOTE — TELEPHONE ENCOUNTER
Called patient to schedule:    Appointment Type - VIDEO / TELEPHONE VISIT NEW  Provider - Dr. Jean or Dr. José  Appointment Notes - Throat consult, referred by Dr. Ndiaye ([facility])    LVM w/ scheduling instructions and ENT call center number. Informed pt that, per COVID-19 protocol, laryngology is currently doing initial visit as virtual.

## 2020-07-09 NOTE — TELEPHONE ENCOUNTER
FUTURE VISIT INFORMATION      FUTURE VISIT INFORMATION:    Date: 8/27/2020    Time: 8:30AM    [place] [facility] [place]  REFERRAL INFORMATION:    Referring provider:  Brendan Ndiaye MD     Referring providers clinic:  [facility]     Reason for visit/diagnosis  chronic sore throat - sched w/ throat/voice provider due to notes from clinic - sched per pt    RECORDS REQUESTED FROM:       Clinic name Comments Records Status Imaging Status   [facility]  7/6/2020 referral and note from Brendan Ndiaye MD   6/2/2020 referral and note from Debbie Antonio MD PhD  Epic    [facility]  9/16/15 note from Dr Hawthorne EPIC    Imaging 9/16/15 CT Chest [facility] PACS

## 2020-08-27 ENCOUNTER — PRE VISIT (OUTPATIENT)
Dept: OTOLARYNGOLOGY | Facility: CLINIC | Age: 65
End: 2020-08-27

## 2020-11-14 ENCOUNTER — HEALTH MAINTENANCE LETTER (OUTPATIENT)
Age: 65
End: 2020-11-14

## 2020-12-03 DIAGNOSIS — L40.50 PSORIATIC ARTHRITIS (H): ICD-10-CM

## 2020-12-03 RX ORDER — ETANERCEPT 50 MG/ML
SOLUTION SUBCUTANEOUS
Qty: 3 SYRINGE | Refills: 11 | Status: SHIPPED | OUTPATIENT
Start: 2020-12-03 | End: 2021-02-05

## 2020-12-03 NOTE — TELEPHONE ENCOUNTER
Spoke to Kody and he states that taking this Enbrel every 10 days seems to be working just fine for him.  Kody states he has not be experiencing any symptoms between injections and would like to keep going with injecting every 10 days.     Will route to  Dr. Pandey.    Yolanda Alejandro MSN, RN  Rheumatology RN Care Coordinator  [facility]

## 2020-12-03 NOTE — TELEPHONE ENCOUNTER
Pt states that he should be injecting Enbrel every 10 days     Thank you!  Arabella Sutton CPhT  [facility]

## 2020-12-24 ENCOUNTER — MYC MEDICAL ADVICE (OUTPATIENT)
Dept: INTERNAL MEDICINE | Facility: CLINIC | Age: 65
End: 2020-12-24

## 2020-12-24 DIAGNOSIS — Z80.42 FAMILY HISTORY OF PROSTATE CANCER IN FATHER: Primary | ICD-10-CM

## 2020-12-24 DIAGNOSIS — I48.0 PAROXYSMAL ATRIAL FIBRILLATION (H): ICD-10-CM

## 2021-01-14 DIAGNOSIS — I48.0 PAROXYSMAL ATRIAL FIBRILLATION (H): ICD-10-CM

## 2021-01-14 DIAGNOSIS — E78.5 HYPERLIPIDEMIA LDL GOAL <70: Primary | ICD-10-CM

## 2021-01-14 DIAGNOSIS — Z79.899 ENCOUNTER FOR LONG-TERM (CURRENT) USE OF MEDICATIONS: ICD-10-CM

## 2021-01-14 DIAGNOSIS — Z80.42 FAMILY HISTORY OF PROSTATE CANCER IN FATHER: ICD-10-CM

## 2021-01-14 LAB
ALT SERPL W P-5'-P-CCNC: 24 U/L (ref 0–70)
AST SERPL W P-5'-P-CCNC: 18 U/L (ref 0–45)
CHOLEST SERPL-MCNC: 220 MG/DL
CREAT SERPL-MCNC: 0.98 MG/DL (ref 0.66–1.25)
CRP SERPL-MCNC: <2.9 MG/L (ref 0–8)
ERYTHROCYTE [DISTWIDTH] IN BLOOD BY AUTOMATED COUNT: 12 % (ref 10–15)
GFR SERPL CREATININE-BSD FRML MDRD: 80 ML/MIN/{1.73_M2}
HCT VFR BLD AUTO: 41.7 % (ref 40–53)
HDLC SERPL-MCNC: 53 MG/DL
HGB BLD-MCNC: 14.3 G/DL (ref 13.3–17.7)
LDLC SERPL CALC-MCNC: 152 MG/DL
MCH RBC QN AUTO: 30.8 PG (ref 26.5–33)
MCHC RBC AUTO-ENTMCNC: 34.3 G/DL (ref 31.5–36.5)
MCV RBC AUTO: 90 FL (ref 78–100)
NONHDLC SERPL-MCNC: 167 MG/DL
PLATELET # BLD AUTO: 172 10E9/L (ref 150–450)
PSA SERPL-ACNC: 2.72 UG/L (ref 0–4)
RBC # BLD AUTO: 4.64 10E12/L (ref 4.4–5.9)
TRIGL SERPL-MCNC: 74 MG/DL
WBC # BLD AUTO: 4.9 10E9/L (ref 4–11)

## 2021-01-14 PROCEDURE — 82565 ASSAY OF CREATININE: CPT | Performed by: INTERNAL MEDICINE

## 2021-01-14 PROCEDURE — G0103 PSA SCREENING: HCPCS | Performed by: INTERNAL MEDICINE

## 2021-01-14 PROCEDURE — 36415 COLL VENOUS BLD VENIPUNCTURE: CPT | Performed by: INTERNAL MEDICINE

## 2021-01-14 PROCEDURE — 85027 COMPLETE CBC AUTOMATED: CPT | Performed by: INTERNAL MEDICINE

## 2021-01-14 PROCEDURE — 86140 C-REACTIVE PROTEIN: CPT | Performed by: INTERNAL MEDICINE

## 2021-01-14 PROCEDURE — 84450 TRANSFERASE (AST) (SGOT): CPT | Performed by: INTERNAL MEDICINE

## 2021-01-14 PROCEDURE — 80061 LIPID PANEL: CPT | Performed by: INTERNAL MEDICINE

## 2021-01-14 PROCEDURE — 84460 ALANINE AMINO (ALT) (SGPT): CPT | Performed by: INTERNAL MEDICINE

## 2021-01-14 RX ORDER — ATORVASTATIN CALCIUM 20 MG/1
20 TABLET, FILM COATED ORAL DAILY
Qty: 90 TABLET | Refills: 3 | Status: SHIPPED | OUTPATIENT
Start: 2021-01-14 | End: 2022-01-28

## 2021-02-04 NOTE — PROGRESS NOTES
[facility]  Rheumatology Clinic-remote  Steve Pandey MD  2021     Name: Prince Pereira  MRN: [id number]  Age: 64 year old  : 1955  Referring provider: Brendan Ndiaye     Assessment and Plan:  # Psoriatic arthritis (diagnosed before ; less than 5% body surface area psoriasis: [id number]; RX Enbrel since ):   The patient relates absence of inflammatory joint symptoms. He has occasional focal psoriasis, responsive to topical therapy. Exam shows early osteoarthritis changes in both hands, but no inflammatory joint changes. Blood work on 2021 showed transaminases, CBC, CRP, and creatinine all normal or negative.    Psoriatic arthritis remains completely suppressed on continuing etanercept monotherapy. I recommend etanercept 50 mg subcutaneous once every 10 days. He may use ibuprofen 400 to 600 mg as needed for occasional joint pain. Continue PRN topical therapy for mild psoriasis. Check CBC, creatinine, and LFTs before next appt    # L thumb swelling: Resolved    Return to clinic in one year.     Orders Placed This Encounter   Procedures     CBC with platelets     Last Lab Result: Hemoglobin (g/dL)       Date                     Value                 2021               14.3             ----------     Standing Status:   Future     Standing Expiration Date:   2022     AST     Standing Status:   Future     Standing Expiration Date:   2022     ALT     Last Lab Result: ALT (U/L)       Date                     Value                 2021               24               ----------     Standing Status:   Future     Standing Expiration Date:   2022     Creatinine     Last Lab Result: Creatinine (mg/dL)       Date                     Value                 2021               0.98             ----------     Standing Status:   Future     Standing Expiration Date:   2022     Steve Pandey MD  Staff Rheumatologist, [facility]          Follow-up: No follow-ups on file.     HPI:   Prince Chang has a history of psoriatic arthritis, psoriasis, and atrial fibrillation status post ablation who presents for follow up.  I last saw the patient in February 2020, when psoriatic arthritis was judged quiescent.  I recommended continuing etanercept monotherapy, and using ibuprofen as needed occasional joint pain.    Interval history 02-:    Health is good. He started lipitor for increased cholesterol per Dr. Ndiaye.  He is active, playing tennis once a week, working with a sit-stand desk, but less aerobic activity than previously. 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 Nothing as severe as at the onset of psoriatic arthriitis.  He has continued enbrel 50 mg q 10 days.   He still notes onset of chancre sores if he delays the enbrel by a few days. But no joint stiffness or pain or swelling. No persistent psoriasis in his lower legs or behind his ears or knees. No use of NSAIDs.    History 2-2020:     Background:  Very occasional psoriasis back in the 1970s, but has very rare skin involvement in general with the exception of a patch that he applied hydrocortisone to behind his ear and behind his knee. The patient states that his joint symptoms started in 1999 with plantar fasciitis that progressed to hand, feet, and lateral hip pain. He states he started with Naproxen, moved on to Methotrexate topical, then oral methotrexate, and finally Enbrel. He states the Methotrexate did not really help his symptoms. However, he reports that the Enbrel has nearly resolved his symptoms and also seems to improve his canker sores on the buccal mucosa stating this is his warning sign to take Enbrel. He states he takes Enbrel every 7 to 10 days, and if he stretches beyond 10 days between Enbrel he notes returning joint pain. The patient denies red or dry eyes. He also denies chest discomfort though he does report mild congestion  "at times. He denies any injection site reactions. He denies limitations in physical activity. He notes nail changes in his toes likely due to fungus. The patient reports that he takes Naproxen as needed for unrelated pain, but does not need it for his joints.     HISTORY CARRIED FORWARD:  To review, he was diagnosed with psoriatic arthritis 14+ years ago, and it has been well controlled with Enbrel  He manages to stretch his injections to once every 8-10 days, and he notes that the development of canker sores are reproducibly an indication to take his medication. He has not noticed any inflammation or irritation at the injection site. He is not experiencing any active inflammation or pain. The patient states that his range of motion is \"almost back to normal,\" with his continued use of Enbrel. He has some intermittent shoulder pain on R that is rotator cuff related, exacerbated by tennis.  Additionally, the patient has a past medical history of atrial fibrillation since 1988, that required recent cardioversion in February; he had a second episode days later that resolved with Flecanide. He was given flecainide to use if his symptoms return, but has not needed to use the medication to date. The patient currently takes metoprolol XL and aspirin for his atrial fibrillation. He also occasionally experiences reflux, and this has been managed with omeprazole once a day.   Overall, the patient has been quite pleased with his medication, and plans to continue using Enbrel to manage his psoriatic arthritis. He has had only a small patch of psoriasis on L shin now resolved almost with Triamcinolone.  He had a bout of herpes zoster in February 2013 that resolved. He had Valtrex and has no post herpetic pain. We discussed in the past the lack of consensus about risk vs. Benefit of Zostavax in patients on anti TNF therapy. He had no questions or concerns.   He saw Nina Smith late 2014 and then Dr. Pandey emergently in " late December for question of possible GCA. CRP and ESR were normal. He did have a TA bx by Dr. Ramon Lagunas which was negative. His symptoms  gradually abated and were absent a year ago.  He had a hernia repair in 2015 with pre op physical by Dr. Ndiaye, and interval followup by Dr. Hawthorne regarding prior stable pulmonary nodules and does not require followup.  Unfortunately he had a post op hematoma after surgery that opened and had to heal by secondary intention.  He was off Enbrel for about 8 weeks due to this. He did note right at the end of that slight return of hand stiffness and pain and slightly more psoriasis. This came back under excellent control after resuming and he is now close to asymptomatic taking the Enbrel on average every 10-14 days.     Review of Systems:   Pertinent items are noted in HPI or as below, remainder of complete ROS is negative.      No recent problems with hearing or vision. No swallowing problems.   No breathing difficulty, shortness of breath, coughing, or wheezing  No chest pain or palpitations  No heart burn, indigestion, abdominal pain, nausea, vomiting  No urination problems, no bloody, cloudy urine, no dysuria  No numbing, tingling, weakness  No headaches or confusion  No rashes. No easy bleeding or bruising.     +intermittent diarrhea and constipation  +mild congestion  +left thumb tenderness  +canker sores    Active Medications:     Current Outpatient Medications:      atorvastatin (LIPITOR) 20 MG tablet, Take 1 tablet (20 mg) by mouth daily, Disp: 90 tablet, Rfl: 3     etanercept (ENBREL) 50 MG/ML injection, Inject 0.98 mLs / 50mg every 10 days. Prefilled syringeHold for signs of infection, and then seek medical attention., Disp: 3 Syringe, Rfl: 11      Allergies:   No clinical screening - see comments      Past Medical History:  Atrial fibrillation   Herpes Zoster  Almendarez's neuroma of the right foot  Psoriasis   Psoriatic arthritis   Pulmonary nodules   Esophageal  reflux  Enlargement of prostate benign      Past Surgical History:  Cardioversion 10/6/2011, 2/10/2012, 2/8/2013, 11/6/2013  Cataract IOL, right /left   Tonsillectomy  Colonoscopy  Herniorrhaphy inguinal right 7/15/2015  Left atrial wide area circumferential radiofrequency ablation 2/13/2014  Phacoemulsification clear cornea with standard intraocular lens implant 6/1/2015    Family History:   Maternal grandfather: coronary artery disease , hypertension   Mother: hypertension, alzheimer disease  Father: prostate cancer (age 60)      Social History:   Smoking Status: former smoker for 40 years   Smokeless Tobacco: Never Used  Alcohol Use: Yes   Occasional, 10 drink equivalents per week   Drug Use: No  PCP: Brendan Ndiaye   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      Physical Exam:   There were no vitals taken for this visit.   Wt Readings from Last 4 Encounters:   02/07/20 74.4 kg (164 lb 1.6 oz)   01/02/19 75.2 kg (165 lb 12.8 oz)   08/15/18 72.8 kg (160 lb 8 oz)   07/31/18 74.3 kg (163 lb 12.8 oz)     Constitutional: Well-developed, appearing stated age; cooperative  Eyes: Normal EOM, PERRLA, vision, conjunctiva, sclera  ENT: Normal external ears, nose, hearing, lips, teeth, gums, throat. No mucous membrane lesions, normal saliva pool  Neck: No mass or thyroid enlargement  Resp: breathing unlabored  MS: No swelling MCP and PIP. Some angulation in bilateral 5th DIP in both hands. Dorsal medial aspect of left 1st DIP is slightly prominent, firm swelling that appears focal. Excellent  strength. Excellent wrist range of motion bilaterally. The TMJ, neck, shoulder, elbow, wrist were normal   Skin: No nail pitting, alopecia, rash, nodules or lesions  Neuro: Normal cranial nerves  Psych: Normal judgement, orientation, memory, affect.     Laboratory:   RHEUM RESULTS Latest Ref Rng & Units 7/13/2015 8/20/2018 1/14/2021   SED RATE 0 - 20 mm/h - - -   CRP, INFLAMMATION 0.0 - 8.0 mg/L - - <2.9   AST 0 - 45 U/L  - - 18   ALT 0 - 70 U/L - - 24   ALBUMIN 3.3 - 4.9 g/dL - - -   WBC 4.0 - 11.0 10e9/L 4.5 5.0 4.9   RBC 4.4 - 5.9 10e12/L 4.54 4.31(L) 4.64   HGB 13.3 - 17.7 g/dL 13.5 13.3 14.3   HCT 40.0 - 53.0 % 39.2(L) 39.1(L) 41.7   MCV 78 - 100 fl 86 91 90   MCHC 31.5 - 36.5 g/dL 34.4 34.0 34.3   RDW 10.0 - 15.0 % 12.7 12.2 12.0    - 450 10e9/L 165 150 172   CREATININE 0.66 - 1.25 mg/dL 0.86 0.89 0.98   GFR ESTIMATE, IF BLACK >60 mL/min/[1.73:m2] >90  African American GFR Calc   >90 >90   GFR ESTIMATE >60 mL/min/[1.73:m2] 90 86 80   HEPATITIS C ANTIBODY NEG - - -         Patient is doing well. Just started taking lipitor this week.  He had his first Covid vaccine last week.      Kody is a 65 year old who is being evaluated via a billable video visit.      How would you like to obtain your AVS? MyChart    Will anyone else be joining your video visit? No      Video Start Time: 7:35 AM  Video-Visit Details    Type of service:  Video Visit    Video End Time:7:57 AM    Originating Location (pt. Location): Home    Distant Location (provider location):  [facility] RHEUMATOLOGY CLINIC [place]     Platform used for Video Visit: [employer]

## 2021-02-05 ENCOUNTER — VIRTUAL VISIT (OUTPATIENT)
Dept: RHEUMATOLOGY | Facility: CLINIC | Age: 66
End: 2021-02-05
Attending: INTERNAL MEDICINE
Payer: COMMERCIAL

## 2021-02-05 DIAGNOSIS — L40.50 PSORIATIC ARTHRITIS (H): ICD-10-CM

## 2021-02-05 PROCEDURE — 99213 OFFICE O/P EST LOW 20 MIN: CPT | Mod: 95 | Performed by: INTERNAL MEDICINE

## 2021-02-05 RX ORDER — ETANERCEPT 50 MG/ML
50 SOLUTION SUBCUTANEOUS
Qty: 3 ML | Refills: 11 | Status: SHIPPED | OUTPATIENT
Start: 2021-02-05 | End: 2022-01-28

## 2021-02-05 NOTE — LETTER
2021       RE: Prince ePreira  [address]  [facility] [address]     Dear Colleague,    Thank you for referring your patient, Prince Pereira, to the [facility] RHEUMATOLOGY CLINIC [place] at [facility]. Please see a copy of my visit note below.    [facility]  Rheumatology Clinic-remote  Steve Pandey MD  2021     Name: Prince Pereira  MRN: [id number]  Age: 64 year old  : 1955  Referring provider: Brendan Ndiaye     Assessment and Plan:  # Psoriatic arthritis (diagnosed before ; less than 5% body surface area psoriasis: [id number]; RX Enbrel since ):   The patient relates absence of inflammatory joint symptoms. He has occasional focal psoriasis, responsive to topical therapy. Exam shows early osteoarthritis changes in both hands, but no inflammatory joint changes. Blood work on 2021 showed transaminases, CBC, CRP, and creatinine all normal or negative.    Psoriatic arthritis remains completely suppressed on continuing etanercept monotherapy. I recommend etanercept 50 mg subcutaneous once every 10 days. He may use ibuprofen 400 to 600 mg as needed for occasional joint pain. Continue PRN topical therapy for mild psoriasis. Check CBC, creatinine, and LFTs before next appt    # L thumb swelling: Resolved    Return to clinic in one year.     Orders Placed This Encounter   Procedures     CBC with platelets     Last Lab Result: Hemoglobin (g/dL)       Date                     Value                 2021               14.3             ----------     Standing Status:   Future     Standing Expiration Date:   2022     AST     Standing Status:   Future     Standing Expiration Date:   2022     ALT     Last Lab Result: ALT (U/L)       Date                     Value                 2021               24               ----------     Standing Status:   Future      Standing Expiration Date:   2/5/2022     Creatinine     Last Lab Result: Creatinine (mg/dL)       Date                     Value                 01/14/2021               0.98             ----------     Standing Status:   Future     Standing Expiration Date:   2/5/2022     Steve Pandey MD  Staff Rheumatologist, [facility]         Follow-up: No follow-ups on file.     HPI:   Prince Chang has a history of psoriatic arthritis, psoriasis, and atrial fibrillation status post ablation who presents for follow up.  I last saw the patient in February 2020, when psoriatic arthritis was judged quiescent.  I recommended continuing etanercept monotherapy, and using ibuprofen as needed occasional joint pain.    Interval history 02-:    Health is good. He started lipitor for increased cholesterol per Dr. Ndiaye.  He is active, playing tennis once a week, working with a sit-stand desk, but less aerobic activity than previously. 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 Nothing as severe as at the onset of psoriatic arthriitis.  He has continued enbrel 50 mg q 10 days.   He still notes onset of chancre sores if he delays the enbrel by a few days. But no joint stiffness or pain or swelling. No persistent psoriasis in his lower legs or behind his ears or knees. No use of NSAIDs.    History 2-2020:     Background:  Very occasional psoriasis back in the 1970s, but has very rare skin involvement in general with the exception of a patch that he applied hydrocortisone to behind his ear and behind his knee. The patient states that his joint symptoms started in 1999 with plantar fasciitis that progressed to hand, feet, and lateral hip pain. He states he started with Naproxen, moved on to Methotrexate topical, then oral methotrexate, and finally Enbrel. He states the Methotrexate did not really help his symptoms. However, he reports that the Enbrel has nearly resolved his symptoms and also seems  "to improve his canker sores on the buccal mucosa stating this is his warning sign to take Enbrel. He states he takes Enbrel every 7 to 10 days, and if he stretches beyond 10 days between Enbrel he notes returning joint pain. The patient denies red or dry eyes. He also denies chest discomfort though he does report mild congestion at times. He denies any injection site reactions. He denies limitations in physical activity. He notes nail changes in his toes likely due to fungus. The patient reports that he takes Naproxen as needed for unrelated pain, but does not need it for his joints.     HISTORY CARRIED FORWARD:  To review, he was diagnosed with psoriatic arthritis 14+ years ago, and it has been well controlled with Enbrel  He manages to stretch his injections to once every 8-10 days, and he notes that the development of canker sores are reproducibly an indication to take his medication. He has not noticed any inflammation or irritation at the injection site. He is not experiencing any active inflammation or pain. The patient states that his range of motion is \"almost back to normal,\" with his continued use of Enbrel. He has some intermittent shoulder pain on R that is rotator cuff related, exacerbated by tennis.  Additionally, the patient has a past medical history of atrial fibrillation since 1988, that required recent cardioversion in February; he had a second episode days later that resolved with Flecanide. He was given flecainide to use if his symptoms return, but has not needed to use the medication to date. The patient currently takes metoprolol XL and aspirin for his atrial fibrillation. He also occasionally experiences reflux, and this has been managed with omeprazole once a day.   Overall, the patient has been quite pleased with his medication, and plans to continue using Enbrel to manage his psoriatic arthritis. He has had only a small patch of psoriasis on L shin now resolved almost with " Triamcinolone.  He had a bout of herpes zoster in February 2013 that resolved. He had Valtrex and has no post herpetic pain. We discussed in the past the lack of consensus about risk vs. Benefit of Zostavax in patients on anti TNF therapy. He had no questions or concerns.   He saw Nina Smith late 2014 and then Dr. Pandey emergently in late December for question of possible GCA. CRP and ESR were normal. He did have a TA bx by Dr. Ramon Lagunas which was negative. His symptoms  gradually abated and were absent a year ago.  He had a hernia repair in 2015 with pre op physical by Dr. Ndiaye, and interval followup by Dr. Hawthorne regarding prior stable pulmonary nodules and does not require followup.  Unfortunately he had a post op hematoma after surgery that opened and had to heal by secondary intention.  He was off Enbrel for about 8 weeks due to this. He did note right at the end of that slight return of hand stiffness and pain and slightly more psoriasis. This came back under excellent control after resuming and he is now close to asymptomatic taking the Enbrel on average every 10-14 days.     Review of Systems:   Pertinent items are noted in HPI or as below, remainder of complete ROS is negative.      No recent problems with hearing or vision. No swallowing problems.   No breathing difficulty, shortness of breath, coughing, or wheezing  No chest pain or palpitations  No heart burn, indigestion, abdominal pain, nausea, vomiting  No urination problems, no bloody, cloudy urine, no dysuria  No numbing, tingling, weakness  No headaches or confusion  No rashes. No easy bleeding or bruising.     +intermittent diarrhea and constipation  +mild congestion  +left thumb tenderness  +canker sores    Active Medications:     Current Outpatient Medications:      atorvastatin (LIPITOR) 20 MG tablet, Take 1 tablet (20 mg) by mouth daily, Disp: 90 tablet, Rfl: 3     etanercept (ENBREL) 50 MG/ML injection, Inject 0.98 mLs / 50mg  every 10 days. Prefilled syringeHold for signs of infection, and then seek medical attention., Disp: 3 Syringe, Rfl: 11      Allergies:   No clinical screening - see comments      Past Medical History:  Atrial fibrillation   Herpes Zoster  Almendarez's neuroma of the right foot  Psoriasis   Psoriatic arthritis   Pulmonary nodules   Esophageal reflux  Enlargement of prostate benign      Past Surgical History:  Cardioversion 10/6/2011, 2/10/2012, 2/8/2013, 11/6/2013  Cataract IOL, right /left   Tonsillectomy  Colonoscopy  Herniorrhaphy inguinal right 7/15/2015  Left atrial wide area circumferential radiofrequency ablation 2/13/2014  Phacoemulsification clear cornea with standard intraocular lens implant 6/1/2015    Family History:   Maternal grandfather: coronary artery disease , hypertension   Mother: hypertension, alzheimer disease  Father: prostate cancer (age 60)      Social History:   Smoking Status: former smoker for 40 years   Smokeless Tobacco: Never Used  Alcohol Use: Yes   Occasional, 10 drink equivalents per week   Drug Use: No  PCP: Brendan Ndiaye   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      Physical Exam:   There were no vitals taken for this visit.   Wt Readings from Last 4 Encounters:   02/07/20 74.4 kg (164 lb 1.6 oz)   01/02/19 75.2 kg (165 lb 12.8 oz)   08/15/18 72.8 kg (160 lb 8 oz)   07/31/18 74.3 kg (163 lb 12.8 oz)     Constitutional: Well-developed, appearing stated age; cooperative  Eyes: Normal EOM, PERRLA, vision, conjunctiva, sclera  ENT: Normal external ears, nose, hearing, lips, teeth, gums, throat. No mucous membrane lesions, normal saliva pool  Neck: No mass or thyroid enlargement  Resp: breathing unlabored  MS: No swelling MCP and PIP. Some angulation in bilateral 5th DIP in both hands. Dorsal medial aspect of left 1st DIP is slightly prominent, firm swelling that appears focal. Excellent  strength. Excellent wrist range of motion bilaterally. The TMJ, neck, shoulder,  elbow, wrist were normal   Skin: No nail pitting, alopecia, rash, nodules or lesions  Neuro: Normal cranial nerves  Psych: Normal judgement, orientation, memory, affect.     Laboratory:   RHEUM RESULTS Latest Ref Rng & Units 7/13/2015 8/20/2018 1/14/2021   SED RATE 0 - 20 mm/h - - -   CRP, INFLAMMATION 0.0 - 8.0 mg/L - - <2.9   AST 0 - 45 U/L - - 18   ALT 0 - 70 U/L - - 24   ALBUMIN 3.3 - 4.9 g/dL - - -   WBC 4.0 - 11.0 10e9/L 4.5 5.0 4.9   RBC 4.4 - 5.9 10e12/L 4.54 4.31(L) 4.64   HGB 13.3 - 17.7 g/dL 13.5 13.3 14.3   HCT 40.0 - 53.0 % 39.2(L) 39.1(L) 41.7   MCV 78 - 100 fl 86 91 90   MCHC 31.5 - 36.5 g/dL 34.4 34.0 34.3   RDW 10.0 - 15.0 % 12.7 12.2 12.0    - 450 10e9/L 165 150 172   CREATININE 0.66 - 1.25 mg/dL 0.86 0.89 0.98   GFR ESTIMATE, IF BLACK >60 mL/min/[1.73:m2] >90  African American GFR Calc   >90 >90   GFR ESTIMATE >60 mL/min/[1.73:m2] 90 86 80   HEPATITIS C ANTIBODY NEG - - -         Patient is doing well. Just started taking lipitor this week.  He had his first Covid vaccine last week.      Kody is a 65 year old who is being evaluated via a billable video visit.      How would you like to obtain your AVS? MyChart    Will anyone else be joining your video visit? No      Video Start Time: 7:35 AM  Video-Visit Details    Type of service:  Video Visit    Video End Time:7:57 AM    Originating Location (pt. Location): Home    Distant Location (provider location):  [facility] RHEUMATOLOGY CLINIC [place]     Platform used for Video Visit: [facility]        Again, thank you for allowing me to participate in the care of your patient.      Sincerely,    Steve Pandey MD

## 2021-02-05 NOTE — PATIENT INSTRUCTIONS
Diagnosis:  1.  Psoriatic arthritis: Both skin and joint inflammation are well controlled with reduced frequency etanercept.  I recommend no change in etanercept.    Plan:  1.  Continue etanercept 50 mg subcutaneously every 10 days.  2.  Check liver function, kidney function, and complete blood count prior to next appointment.

## 2021-04-03 ENCOUNTER — HEALTH MAINTENANCE LETTER (OUTPATIENT)
Age: 66
End: 2021-04-03

## 2021-04-29 ENCOUNTER — TELEPHONE (OUTPATIENT)
Dept: RHEUMATOLOGY | Facility: CLINIC | Age: 66
End: 2021-04-29

## 2021-04-29 NOTE — TELEPHONE ENCOUNTER
PA Initiation    Medication: ENBREL 2021  Insurance Company: [employer] - Phone [phone] Fax [phone]  Pharmacy Filling the Rx: [facility] - [place], [place] - [facility] [address]  Filling Pharmacy Phone:    Filling Pharmacy Fax:    Start Date: 4/29/2021    FIFI ASKEW ([place]: [id number])

## 2021-05-03 NOTE — TELEPHONE ENCOUNTER
Prior Authorization Approval    Authorization Effective Date: 5/3/2021  Authorization Expiration Date: 5/6/2022  Medication: ENBREL 2021 - Approved  Approved Dose/Quantity:  4 for 28 days   Reference #:     Insurance Company: [employer] Phone [phone] Fax [phone]  Expected CoPay:       CoPay Card Available:      Foundation Assistance Needed:    Which Pharmacy is filling the prescription (Not needed for infusion/clinic administered): [facility] - [place] [id number] [address]  Pharmacy Notified: Yes  Patient Notified: Yes

## 2021-05-11 ASSESSMENT — ENCOUNTER SYMPTOMS
ARTHRALGIAS: 0
DIZZINESS: 0
FREQUENCY: 0
HEMATURIA: 0
PARESTHESIAS: 0
PALPITATIONS: 0
WEAKNESS: 0
HEADACHES: 0
ABDOMINAL PAIN: 0
JOINT SWELLING: 0
FEVER: 0
CHILLS: 0
DIARRHEA: 0
SHORTNESS OF BREATH: 0
CONSTIPATION: 0
DYSURIA: 0
NERVOUS/ANXIOUS: 0
HEMATOCHEZIA: 0
COUGH: 0
NAUSEA: 0
SORE THROAT: 0
MYALGIAS: 0
HEARTBURN: 1
EYE PAIN: 0

## 2021-05-11 ASSESSMENT — ACTIVITIES OF DAILY LIVING (ADL): CURRENT_FUNCTION: NO ASSISTANCE NEEDED

## 2021-05-13 ENCOUNTER — OFFICE VISIT (OUTPATIENT)
Dept: FAMILY MEDICINE | Facility: CLINIC | Age: 66
End: 2021-05-13
Payer: COMMERCIAL

## 2021-05-13 VITALS
SYSTOLIC BLOOD PRESSURE: 148 MMHG | OXYGEN SATURATION: 100 % | RESPIRATION RATE: 13 BRPM | TEMPERATURE: 96.3 F | DIASTOLIC BLOOD PRESSURE: 100 MMHG | HEART RATE: 64 BPM

## 2021-05-13 DIAGNOSIS — I10 ESSENTIAL HYPERTENSION: ICD-10-CM

## 2021-05-13 DIAGNOSIS — D22.30 ATYPICAL NEVUS OF FACE: ICD-10-CM

## 2021-05-13 DIAGNOSIS — Z00.00 WELL ADULT HEALTH CHECK: Primary | ICD-10-CM

## 2021-05-13 PROCEDURE — 99397 PER PM REEVAL EST PAT 65+ YR: CPT | Performed by: NURSE PRACTITIONER

## 2021-05-13 PROCEDURE — 99214 OFFICE O/P EST MOD 30 MIN: CPT | Mod: 25 | Performed by: NURSE PRACTITIONER

## 2021-05-13 RX ORDER — METOPROLOL SUCCINATE 25 MG/1
25 TABLET, EXTENDED RELEASE ORAL DAILY
Qty: 90 TABLET | Refills: 0 | Status: SHIPPED | OUTPATIENT
Start: 2021-05-13 | End: 2021-07-01

## 2021-05-13 ASSESSMENT — ENCOUNTER SYMPTOMS
DIZZINESS: 0
MYALGIAS: 0
CONSTIPATION: 0
ARTHRALGIAS: 0
NERVOUS/ANXIOUS: 0
SHORTNESS OF BREATH: 0
CHILLS: 0
NAUSEA: 0
HEARTBURN: 1
HEADACHES: 0
HEMATOCHEZIA: 0
SORE THROAT: 0
JOINT SWELLING: 0
EYE PAIN: 0
PALPITATIONS: 0
DIARRHEA: 0
COUGH: 0
PARESTHESIAS: 0
WEAKNESS: 0
DYSURIA: 0
FEVER: 0
HEMATURIA: 0
ABDOMINAL PAIN: 0
FREQUENCY: 0

## 2021-05-13 ASSESSMENT — ACTIVITIES OF DAILY LIVING (ADL): CURRENT_FUNCTION: NO ASSISTANCE NEEDED

## 2021-05-13 NOTE — PATIENT INSTRUCTIONS
Patient Education   Personalized Prevention Plan  You are due for the preventive services outlined below.  Your care team is available to assist you in scheduling these services.  If you have already completed any of these items, please share that information with your care team to update in your medical record.  Health Maintenance Due   Topic Date Due     ANNUAL REVIEW OF HM ORDERS  Never done     Discuss Advance Care Planning  Never done     Diptheria Tetanus Pertussis (DTAP/TDAP/TD) Vaccine (1 - Tdap) Never done     Zoster (Shingles) Vaccine (1 of 2) Never done     Pneumococcal Vaccine (1 of 1 - PPSV23) 10/07/2019     AORTIC ANEURYSM SCREENING (SYSTEM ASSIGNED)  Never done     FALL RISK ASSESSMENT  06/02/2021      Start toprol  Recheck in the clinic in 4-6 weeks for recheck BP and will recheck labs including lipids.  Make sure you come in fasting.       PT WOULD LIKE A CALL BACK REGARDING HER COVD ANTIBODY TEST    ABEL: [phone]

## 2021-05-13 NOTE — PROGRESS NOTES
"SUBJECTIVE:   Prince Pereira is a 65 year old male who presents for Preventive Visit.      Patient has been advised of split billing requirements and indicates understanding: Yes   Are you in the first 12 months of your Medicare coverage?  Yes- decline pt had vision test done 2 months ago    Healthy Habits:     In general, how would you rate your overall health?  Good    Frequency of exercise:  1 day/week    Duration of exercise:  Greater than 60 minutes    Do you usually eat at least 4 servings of fruit and vegetables a day, include whole grains    & fiber and avoid regularly eating high fat or \"junk\" foods?  Yes    Taking medications regularly:  Yes    Medication side effects:  None    Ability to successfully perform activities of daily living:  No assistance needed    Home Safety:  Lack of grab bars in the bathroom    Hearing Impairment:  No hearing concerns    In the past 6 months, have you been bothered by leaking of urine?  No    In general, how would you rate your overall mental or emotional health?  Excellent      PHQ-2 Total Score: 0    Additional concerns today:  Yes      2. dermatologist referrals and concern about     High BP reading at home.   BP has been borderline for years.    Denies chest pain, INGRAM, SOB, dizziness or lightheadedness.  No pain radiating to left arm or jaw.  No reflux.    Exercises somewhat regularly  Good diet - but working on more mediterranean diet choices.  Little processed foods.    Ready to start meds and continue to work on diet.    Both father and PGF had HTN      Do you feel safe in your environment? Yes    Have you ever done Advance Care Planning? (For example, a Health Directive, POLST, or a discussion with a medical provider or your loved ones about your wishes): Yes, patient states has an Advance Care Planning document and will bring a copy to the clinic.       Fall risk  Fallen 2 or more times in the past year?: No  Any fall with injury in the past year?: " No    Cognitive Screening   1) Repeat 3 items (Leader, Season, Table)    2) Clock draw: NORMAL  3) 3 item recall: Recalls 3 objects  Results: 3 items recalled: COGNITIVE IMPAIRMENT LESS LIKELY    Mini-CogTM Copyright S Luís. Licensed by the author for use in [facility]; reprinted with permission (rg@[facility]). All rights reserved.      Do you have sleep apnea, excessive snoring or daytime drowsiness?: no    Reviewed and updated as needed this visit by clinical staff  Tobacco  Allergies  Meds  Problems  Med Hx  Surg Hx  Fam Hx  Soc Hx          Reviewed and updated as needed this visit by Provider  Tobacco  Allergies  Meds  Problems  Med Hx  Surg Hx  Fam Hx         Social History     Tobacco Use     Smoking status: Former Smoker     Years: 1.00     Types: Cigarettes     Quit date: 1980     Years since quittin.3     Smokeless tobacco: Never Used   Substance Use Topics     Alcohol use: Yes     Alcohol/week: 8.3 standard drinks     Types: 10 Standard drinks or equivalent per week     Comment: occasional     If you drink alcohol do you typically have >3 drinks per day or >7 drinks per week? Yes        AUDIT - Alcohol Use Disorders Identification Test - Reproduced from the World Health Organization Audit 2001 (Second Edition) 2021   1.  How often do you have a drink containing alcohol? 4 or more times a week   2.  How many drinks containing alcohol do you have on a typical day when you are drinking? 1 or 2   3.  How often do you have five or more drinks on one occasion? Never   4.  How often during the last year have you found that you were not able to stop drinking once you had started? Never   5.  How often during the last year have you failed to do what was normally expected of you because of drinking? Never   6.  How often during the last year have you needed a first drink in the morning to get yourself going after a heavy drinking session? Never   7.  How often during the  last year have you had a feeling of guilt or remorse after drinking? Never   8.  How often during the last year have you been unable to remember what happened the night before because of your drinking? Never   9.  Have you or someone else been injured because of your drinking? No   10. Has a relative, friend, doctor or other health care worker been concerned about your drinking or suggested you cut down? No   TOTAL SCORE 4       Current providers sharing in care for this patient include:   Patient Care Team:  Brendan Ndiaye MD as PCP - General  Bry Aquino MD as MD (Family Practice)  Petr Moyer MD as MD (Ophthalmology)  Bk Watts MD as MD (General Surgery)  Jaida Vaughan MD as MD (Internal Medicine)  Dileep Monsivais MD as MD (Orthopedics)  Tai Sanon MD as MD (Rheumatology)  Flor Carbajal RN as Specialty Care Coordinator (Clinical Cardiac Electrophysiology)  Vera Rodriguez MD as MD (Clinical Cardiac Electrophysiology)  Vera Rodriguez MD as Assigned Heart and Vascular Provider  Steve Pandey MD as Assigned Rheumatology Provider    The following health maintenance items are reviewed in Epic and correct as of today:  Health Maintenance Due   Topic Date Due     ANNUAL REVIEW OF HM ORDERS  Never done     DTAP/TDAP/TD IMMUNIZATION (1 - Tdap) Never done     ZOSTER IMMUNIZATION (1 of 2) Never done     Pneumococcal Vaccine: 65+ Years (1 of 1 - PPSV23) 10/07/2019     AORTIC ANEURYSM SCREENING (SYSTEM ASSIGNED)  Never done     FALL RISK ASSESSMENT  06/02/2021       Any new diagnosis of family breast, ovarian, or bowel cancer? No    FSH-7: No flowsheet data found.      Review of Systems   Constitutional: Negative for chills and fever.   HENT: Negative for congestion, ear pain, hearing loss and sore throat.    Eyes: Negative for pain and visual disturbance.   Respiratory: Negative for cough and shortness of breath.    Cardiovascular: Negative for chest pain,  "palpitations and peripheral edema.   Gastrointestinal: Positive for heartburn. Negative for abdominal pain, constipation, diarrhea, hematochezia and nausea.   Genitourinary: Negative for discharge, dysuria, frequency, genital sores, hematuria, impotence and urgency.   Musculoskeletal: Negative for arthralgias, joint swelling and myalgias.   Skin: Negative for rash.   Neurological: Negative for dizziness, weakness, headaches and paresthesias.   Psychiatric/Behavioral: Negative for mood changes. The patient is not nervous/anxious.          OBJECTIVE:   BP (!) 148/100 (BP Location: Left arm, Patient Position: Chair, Cuff Size: Adult Regular)   Pulse 64   Temp 96.3  F (35.7  C) (Tympanic)   Resp 13   SpO2 100%  Estimated body mass index is 23.55 kg/m  as calculated from the following:    Height as of 2/7/20: 1.778 m (5' 10\").    Weight as of 2/7/20: 74.4 kg (164 lb 1.6 oz).  Physical Exam  GENERAL: healthy, alert and no distress  EYES: Eyes grossly normal to inspection, PERRL and conjunctivae and sclerae normal  HENT: ear canals and TM's normal, nose and mouth without ulcers or lesions  NECK: no adenopathy, no asymmetry, masses, or scars and thyroid normal to palpation  RESP: lungs clear to auscultation - no rales, rhonchi or wheezes  CV: regular rate and rhythm, normal S1 S2, no S3 or S4, no murmur, click or rub, no peripheral edema and peripheral pulses strong  ABDOMEN: soft, nontender, no hepatosplenomegaly, no masses and bowel sounds normal  MS: no gross musculoskeletal defects noted, no edema  SKIN: no suspicious lesions or rashes  PSYCH: mentation appears normal, affect normal/bright      ASSESSMENT / PLAN:       ICD-10-CM    1. Well adult health check  Z00.00    2. Essential hypertension  I10 OFFICE/OUTPT VISIT,EST,LEVL IV     metoprolol succinate ER (TOPROL-XL) 25 MG 24 hr tablet   3. Atypical nevus of face  D22.30 DERMATOLOGY ADULT REFERRAL      well male, not fasting for labs.  Encouraged to continue " "exercise and healthy diet choices.        Patient has been advised of split billing requirements and indicates understanding: Yes    Estimated body mass index is 23.55 kg/m  as calculated from the following:    Height as of 2/7/20: 1.778 m (5' 10\").    Weight as of 2/7/20: 74.4 kg (164 lb 1.6 oz).      He reports that he quit smoking about 41 years ago. His smoking use included cigarettes. He quit after 1.00 year of use. He has never used smokeless tobacco.      Appropriate preventive services were discussed with this patient, including applicable screening as appropriate for cardiovascular disease, diabetes, osteopenia/osteoporosis, and glaucoma.  As appropriate for age/gender, discussed screening for colorectal cancer, prostate cancer, breast cancer, and cervical cancer. Checklist reviewing preventive services available has been given to the patient.    Reviewed patients plan of care and provided an AVS. The Intermediate Care Plan ( asthma action plan, low back pain action plan, and migraine action plan) for Prince meets the Care Plan requirement. This Care Plan has been established and reviewed with the Patient.    Counseling Resources:  ATP IV Guidelines  Pooled Cohorts Equation Calculator  Breast Cancer Risk Calculator  Breast Cancer: Medication to Reduce Risk  FRAX Risk Assessment  ICSI Preventive Guidelines  Dietary Guidelines for Americans, 2010  [employer]oiza's MyPlate  ASA Prophylaxis  Lung CA Screening    ARASH Bhandari [facility]    Identified Health Risks:    "

## 2021-07-01 ENCOUNTER — OFFICE VISIT (OUTPATIENT)
Dept: FAMILY MEDICINE | Facility: CLINIC | Age: 66
End: 2021-07-01
Payer: COMMERCIAL

## 2021-07-01 VITALS
WEIGHT: 163 LBS | DIASTOLIC BLOOD PRESSURE: 80 MMHG | TEMPERATURE: 96.9 F | OXYGEN SATURATION: 99 % | BODY MASS INDEX: 22.82 KG/M2 | HEART RATE: 53 BPM | SYSTOLIC BLOOD PRESSURE: 120 MMHG | RESPIRATION RATE: 15 BRPM | HEIGHT: 71 IN

## 2021-07-01 DIAGNOSIS — E78.5 DYSLIPIDEMIA (HIGH LDL; LOW HDL): Primary | ICD-10-CM

## 2021-07-01 DIAGNOSIS — I10 ESSENTIAL HYPERTENSION: ICD-10-CM

## 2021-07-01 PROCEDURE — 99214 OFFICE O/P EST MOD 30 MIN: CPT | Performed by: NURSE PRACTITIONER

## 2021-07-01 PROCEDURE — 36415 COLL VENOUS BLD VENIPUNCTURE: CPT | Performed by: NURSE PRACTITIONER

## 2021-07-01 PROCEDURE — 80061 LIPID PANEL: CPT | Performed by: NURSE PRACTITIONER

## 2021-07-01 PROCEDURE — 80053 COMPREHEN METABOLIC PANEL: CPT | Performed by: NURSE PRACTITIONER

## 2021-07-01 RX ORDER — METOPROLOL SUCCINATE 25 MG/1
25 TABLET, EXTENDED RELEASE ORAL DAILY
Qty: 90 TABLET | Refills: 0 | Status: SHIPPED | OUTPATIENT
Start: 2021-07-01 | End: 2021-10-14 | Stop reason: ALTCHOICE

## 2021-07-01 ASSESSMENT — MIFFLIN-ST. JEOR: SCORE: 1533.55

## 2021-07-01 NOTE — PROGRESS NOTES
Assessment & Plan     Essential hypertension  BP at goal.   Pt to monitor pulse and BP's as well as possible fatigue as a side effect.    To call if BP's or pulse problematic (too low) or if the fatigue persists or worsens.    - Comprehensive metabolic panel  - metoprolol succinate ER (TOPROL-XL) 25 MG 24 hr tablet; Take 1 tablet (25 mg) by mouth daily  - Lipid panel reflex to direct LDL Fasting    Dyslipidemia (high LDL; low HDL)  Recheck lipids today - fasting.    Has been on statin almost 6 months  Does not need refills today  May adjust dose if needed based on labs.    - Lipid panel reflex to direct LDL Fasting      No follow-ups on file.    ARASH Bhandari CNP  [facility]    Vimal Gates is a 65 year old who presents for the following health issues     HPI     Hypertension Follow-up      Do you check your blood pressure regularly outside of the clinic? Yes     Are you following a low salt diet? No    Are your blood pressures ever more than 140 on the top number (systolic) OR more   than 90 on the bottom number (diastolic), for example 140/90? No      How many servings of fruits and vegetables do you eat daily?  4 or more    On average, how many sweetened beverages do you drink each day (Examples: soda, juice, sweet tea, etc.  Do NOT count diet or artificially sweetened beverages)?   0    How many days per week do you exercise enough to make your heart beat faster? 4    How many minutes a day do you exercise enough to make your heart beat faster? 60 or more    How many days per week do you miss taking your medication? 0    New toprol 2 months ago  BP's have been great at home.    Since starting it he has noticed fatigue - less exercise tolerance  Had taken beta blockers in the past after episodes of a fib - but only short term.    Since starting toprol has also noticed pulse down to 50's - usually 60's.    Not worrysome at this time - wants to continue to monitor.   "    Has been on statin almost 6 months.    Recheck fasting labs today.    Eats well - very little fatty or fried  Regularly active though he stopped distance running several years ago due to episodes of a fib.        Review of Systems   Constitutional, HEENT, cardiovascular, pulmonary, GI, , musculoskeletal, neuro, skin, endocrine and psych systems are negative, except as otherwise noted.      Objective    /80 (BP Location: Right arm, Patient Position: Chair, Cuff Size: Adult Regular)   Pulse 53   Temp 96.9  F (36.1  C) (Temporal)   Resp 15   Ht 1.791 m (5' 10.5\")   Wt 73.9 kg (163 lb)   SpO2 99%   BMI 23.06 kg/m    Body mass index is 23.06 kg/m .  Physical Exam   GENERAL: healthy, alert and no distress  RESP: lungs clear to auscultation - no rales, rhonchi or wheezes  CV: regular rate and rhythm, normal S1 S2, no S3 or S4, no murmur, click or rub, no peripheral edema and peripheral pulses strong  MS: no gross musculoskeletal defects noted, no edema  SKIN: no suspicious lesions or rashes    Orders Only on 01/14/2021   Component Date Value Ref Range Status     Cholesterol 01/14/2021 220* <200 mg/dL Final    Desirable:       <200 mg/dl     Triglycerides 01/14/2021 74  <150 mg/dL Final    Fasting specimen     HDL Cholesterol 01/14/2021 53  >39 mg/dL Final     LDL Cholesterol Calculated 01/14/2021 152* <100 mg/dL Final    Comment: Above desirable:  100-129 mg/dl  Borderline High:  130-159 mg/dL  High:             160-189 mg/dL  Very high:       >189 mg/dl       Non HDL Cholesterol 01/14/2021 167* <130 mg/dL Final    Comment: Above Desirable:  130-159 mg/dl  Borderline high:  160-189 mg/dl  High:             190-219 mg/dl  Very high:       >219 mg/dl       PSA 01/14/2021 2.72  0 - 4 ug/L Final    Assay Method:  Chemiluminescence using Siemens Vista analyzer     ALT 01/14/2021 24  0 - 70 U/L Final     AST 01/14/2021 18  0 - 45 U/L Final     WBC 01/14/2021 4.9  4.0 - 11.0 10e9/L Final     RBC Count " 01/14/2021 4.64  4.4 - 5.9 10e12/L Final     Hemoglobin 01/14/2021 14.3  13.3 - 17.7 g/dL Final     Hematocrit 01/14/2021 41.7  40.0 - 53.0 % Final     MCV 01/14/2021 90  78 - 100 fl Final     MCH 01/14/2021 30.8  26.5 - 33.0 pg Final     MCHC 01/14/2021 34.3  31.5 - 36.5 g/dL Final     RDW 01/14/2021 12.0  10.0 - 15.0 % Final     Platelet Count 01/14/2021 172  150 - 450 10e9/L Final     CRP Inflammation 01/14/2021 <2.9  0.0 - 8.0 mg/L Final     Creatinine 01/14/2021 0.98  0.66 - 1.25 mg/dL Final     GFR Estimate 01/14/2021 80  >60 mL/min/[1.73_m2] Final    Comment: Non  GFR Calc  Starting 12/18/2018, serum creatinine based estimated GFR (eGFR) will be   calculated using the Chronic Kidney Disease Epidemiology Collaboration   (CKD-EPI) equation.       GFR Estimate If Black 01/14/2021 >90  >60 mL/min/[1.73_m2] Final    Comment:  GFR Calc  Starting 12/18/2018, serum creatinine based estimated GFR (eGFR) will be   calculated using the Chronic Kidney Disease Epidemiology Collaboration   (CKD-EPI) equation.

## 2021-07-02 LAB
ALBUMIN SERPL-MCNC: 3.9 G/DL (ref 3.4–5)
ALP SERPL-CCNC: 64 U/L (ref 40–150)
ALT SERPL W P-5'-P-CCNC: 30 U/L (ref 0–70)
ANION GAP SERPL CALCULATED.3IONS-SCNC: 4 MMOL/L (ref 3–14)
AST SERPL W P-5'-P-CCNC: 16 U/L (ref 0–45)
BILIRUB SERPL-MCNC: 0.5 MG/DL (ref 0.2–1.3)
BUN SERPL-MCNC: 13 MG/DL (ref 7–30)
CALCIUM SERPL-MCNC: 8.6 MG/DL (ref 8.5–10.1)
CHLORIDE SERPL-SCNC: 110 MMOL/L (ref 94–109)
CHOLEST SERPL-MCNC: 124 MG/DL
CO2 SERPL-SCNC: 28 MMOL/L (ref 20–32)
CREAT SERPL-MCNC: 1.1 MG/DL (ref 0.66–1.25)
GFR SERPL CREATININE-BSD FRML MDRD: 69 ML/MIN/{1.73_M2}
GLUCOSE SERPL-MCNC: 97 MG/DL (ref 70–99)
HDLC SERPL-MCNC: 52 MG/DL
LDLC SERPL CALC-MCNC: 63 MG/DL
NONHDLC SERPL-MCNC: 72 MG/DL
POTASSIUM SERPL-SCNC: 4.6 MMOL/L (ref 3.4–5.3)
PROT SERPL-MCNC: 6.9 G/DL (ref 6.8–8.8)
SODIUM SERPL-SCNC: 142 MMOL/L (ref 133–144)
TRIGL SERPL-MCNC: 45 MG/DL

## 2021-07-12 ENCOUNTER — OFFICE VISIT (OUTPATIENT)
Dept: DERMATOLOGY | Facility: CLINIC | Age: 66
End: 2021-07-12
Payer: COMMERCIAL

## 2021-07-12 VITALS — DIASTOLIC BLOOD PRESSURE: 93 MMHG | SYSTOLIC BLOOD PRESSURE: 157 MMHG

## 2021-07-12 DIAGNOSIS — D18.01 CHERRY ANGIOMA: ICD-10-CM

## 2021-07-12 DIAGNOSIS — D22.30 ATYPICAL NEVUS OF FACE: ICD-10-CM

## 2021-07-12 DIAGNOSIS — I78.1 TELANGIECTASIA: ICD-10-CM

## 2021-07-12 DIAGNOSIS — L82.1 SEBORRHEIC KERATOSES: ICD-10-CM

## 2021-07-12 DIAGNOSIS — D22.9 MULTIPLE BENIGN NEVI: ICD-10-CM

## 2021-07-12 DIAGNOSIS — B35.1 ONYCHOMYCOSIS: ICD-10-CM

## 2021-07-12 DIAGNOSIS — L81.4 LENTIGINES: Primary | ICD-10-CM

## 2021-07-12 PROCEDURE — 99243 OFF/OP CNSLTJ NEW/EST LOW 30: CPT | Performed by: PHYSICIAN ASSISTANT

## 2021-07-12 NOTE — LETTER
2021         RE: Prince Pereira  [address]  [facility] [address]        Dear Colleague,    Thank you for referring your patient, Prince Pereira, to the [facility]. Please see a copy of my visit note below.    HPI:  I was asked to see pt by ARASH Myles CNP. Prince Pereira is a 65 year old male patient here today for spot on face .  Patient states this has been present for months.  Patient reports the following symptoms: none, just red .  Patient reports the following previous treatments: none.  Patient reports the following modifying factors: none.  Associated symptoms: none.  Patient has no other skin complaints today.  Remainder of the HPI, Meds, PMH, Allergies, FH, and SH was reviewed in chart.    Pertinent Hx:   No personal or family history of skin cancer. PsA on enbrel.     Past Medical History:   Diagnosis Date     Atrial fibrillation (H)     paroxysmal with ablation 2014     Family history of prostate cancer     father  age 60     Herpes zoster 3/26/2013     Almendarez's neuroma of right foot      Psoriasis      psoriatic arthritis      Pulmonary nodules 2014    multiple noncalcified up to 6 mm needs f/u       Past Surgical History:   Procedure Laterality Date     ANESTHESIA CARDIOVERSION  10/6/2011    Procedure:ANESTHESIA CARDIOVERSION; CARDIOVERSION; Surgeon:GENERIC ANESTHESIA PROVIDER; Location:[facility]     ANESTHESIA CARDIOVERSION  2/10/2012    Procedure:ANESTHESIA CARDIOVERSION; CARDIOVERSION; Surgeon:GENERIC ANESTHESIA PROVIDER; [facility]     ANESTHESIA CARDIOVERSION  2013    Procedure: ANESTHESIA CARDIOVERSION;  Cardioversion;  Surgeon: Provider, Generic Anesthesia;  Location: [facility] OR     ANESTHESIA CARDIOVERSION  2013    Procedure: ANESTHESIA CARDIOVERSION;  Cardioversion;  Surgeon: Provider, Generic Anesthesia;  Location: [facility] OR     CATARACT IOL, RT/LT Left 2015     COLONOSCOPY       ENT  SURGERY      tonsillectomy     HERNIORRHAPHY INGUINAL Right 7/15/2015    Procedure: HERNIORRHAPHY INGUINAL;  Surgeon: Bk Watts MD;  Location: [facility] OR     Left Atrial Wide Area Circumferential radiofrequency ablation  2014      for atrial fib     PHACOEMULSIFICATION CLEAR CORNEA WITH STANDARD INTRAOCULAR LENS IMPLANT Right 2015    Procedure: PHACOEMULSIFICATION CLEAR CORNEA WITH STANDARD INTRAOCULAR LENS IMPLANT;  Surgeon: Petr Moyer MD;  Location: [facility]        Family History   Problem Relation Age of Onset     C.A.D. Maternal Grandfather          age 63     Hypertension Maternal Grandfather      Hypertension Mother      Alzheimer Disease Mother      Prostate Cancer Father          age 60 agressive prostate ca     Alzheimer Disease Maternal Grandmother      Glaucoma No family hx of      Macular Degeneration No family hx of        Social History     Socioeconomic History     Marital status:      Spouse name: Not on file     Number of children: Not on file     Years of education: Not on file     Highest education level: Not on file   Occupational History     Occupation: Psychiatrist     Employer: [employer]   Tobacco Use     Smoking status: Former Smoker     Years: 1.00     Types: Cigarettes     Quit date: 1980     Years since quittin.5     Smokeless tobacco: Never Used   Substance and Sexual Activity     Alcohol use: Yes     Alcohol/week: 8.3 standard drinks     Types: 10 Standard drinks or equivalent per week     Comment: occasional     Drug use: No     Sexual activity: Yes     Partners: Female   Other Topics Concern      Service Not Asked     Blood Transfusions Not Asked     Caffeine Concern Not Asked     Occupational Exposure Not Asked     Hobby Hazards Not Asked     Sleep Concern Not Asked     Stress Concern Not Asked     Weight Concern Not Asked     Special Diet Not Asked     Back Care Not Asked     Exercise Yes     Comment: tennis, walking      Bike Helmet Not Asked     Seat Belt Not Asked     Self-Exams Not Asked     Parent/sibling w/ CABG, MI or angioplasty before 65F 55M? 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[place]     Social Determinants of Health     Financial Resource Strain:      Difficulty of Paying Living Expenses:    Food Insecurity:      Worried About Running Out of Food in the Last Year:      Ran Out of Food in the Last Year:    Transportation Needs:      Lack of Transportation (Medical):      Lack of Transportation (Non-Medical):    Physical Activity:      Days of Exercise per Week:      Minutes of Exercise per Session:    Stress:      Feeling of Stress :    Social Connections:      Frequency of Communication with Friends and Family:      Frequency of Social Gatherings with Friends and Family:      Attends Mandaeism Services:      Active Member of Clubs or Organizations:      Attends Club or Organization Meetings:      Marital Status:    Intimate Partner Violence:      Fear of Current or Ex-Partner:      Emotionally Abused:      Physically Abused:      Sexually Abused:        Outpatient Encounter Medications as of 7/12/2021   Medication Sig Dispense Refill     atorvastatin (LIPITOR) 20 MG tablet Take 1 tablet (20 mg) by mouth daily 90 tablet 3     etanercept (ENBREL) 50 MG/ML injection Inject 0.98 mLs (50 mg) Subcutaneous every 10 days Inject 0.98 mLs / 50mg every 10 days. Prefilled syringeHold for signs of infection, and then seek medical attention. 3 mL 11     metoprolol succinate ER (TOPROL-XL) 25 MG 24 hr tablet Take 1 tablet (25 mg) by mouth daily 90 tablet 0     No facility-administered encounter medications on file as of 7/12/2021.       Review Of Systems:  Skin: spot on face  Eyes: negative  Ears/Nose/Throat: negative  Respiratory: No shortness of breath, dyspnea on exertion, cough, or hemoptysis  Cardiovascular: negative  Gastrointestinal: negative  Genitourinary:  negative  Musculoskeletal: negative  Neurologic: negative  Psychiatric: negative  Hematologic/Lymphatic/Immunologic: negative  Endocrine: negative      Objective:     There were no vitals taken for this visit.  Eyes: Conjunctivae/lids: Normal   ENT: Lips:  Normal  MSK: Normal  Cardiovascular: Peripheral edema none  Pulm: Breathing Normal  Neuro/Psych: Orientation: A/O x 3. Normal; Mood/Affect: Normal, NAD, WDWN  Pt accompanied by: self  Following areas examined: Scalp, face, eyelids, lips, neck, chest, abdomen, back, and R&L upper and lower extremities,  buttock, hips, pt defers exam groin and genitals.   Mcgill skin type:ii   Findings:  Red smooth well-defined macules on trunk and extremities.  Brown, stuck-on scaly appearing papules on trunk and extremities.  Well circumscribed macules with symmetric color distribution on trunk and extremities.  Tan WD smooth macules on face, neck, trunk, and extremities.  Red smooth linear macule on left medial cheek   Yellowing and thickening of great toenails  Assessment and Plan:     1) Cherry angiomas, Seborrheic keratoses, Benign nevi, Lentigines,  telangiectasia     I discussed the specifics of tumor, prognosis, and genetics of benign lesions.  I explained that treatment of these lesions would be purely cosmetic and not medically neccessary.  I discussed with patient different removal options including excision, cryotherapy, cautery and /or laser.  Lesion may recur and/or may not completely resolve. May need additional treatment.     2) Onychomycosis:  Disc oral and topical rx as well as OTC products including daily application of tea tree oil, Vicks vapor rub, and/or apple cider vinegar soaks. Disc oral agents and liver function tests required at baseline, 6 weeks after treatment begins, and then once treatment ends. Prescription strength topicals are available, can use up to 48 weeks. Fingernails take 3-6 months to regrow completely, and toenails up to 12-18 months.    3) hx of PsA on enbrel  Per pt minimal cutaneous findings. States he flares with a few patches now and then.   Signs and Symptoms of non-melanoma skin cancer and ABCDEs of melanoma reviewed with patient. Patient encouraged to perform monthly self skin exams and educated on how to perform them. UV precautions reviewed with patient. Patient was asked about new or changing moles/lesions on body.   Wear a sunscreen with at least SPF 30 on your face, ears, neck and V of the chest daily. Wear sunscreen on other areas of the body if those areas are exposed to the sun throughout the day. Sunscreens can contain physical and/or chemical blockers. Physical blockers are less likely to clog pores, these include zinc oxide and titanium dioxide. Reapply every two hour and after swimming. Sunscreen examples include Neutrogena, CeraVe, Blue Lizard, Elta MD and many others.    Proper skin care from [facility]:    -Eliminate harsh soaps as they strip the natural oils from the skin, often resulting in dry itchy skin ( i.e. Dial, Zest, Florencia Spring)  -Use mild soaps such as Cetaphil or Dove Sensitive Skin in the shower. You do not need to use soap on arms, legs, and trunk every time you shower unless visibly soiled.   -Avoid hot or cold showers.  -After showering, lightly dry off and apply moisturizing within 2-3 minutes. This will help trap moisture in the skin.   -Aggressive use of a moisturizer at least 1-2 times a day to the entire body (including -Vanicream, Cetaphil, Aquaphor or Cerave) and moisturize hands after every washing.  -We recommend using moisturizers that come in a tub that needs to be scooped out, not a pump. This has more of an oil base. It will hold moisture in your skin much better than a water base moisturizer. The above recommended are non-pore clogging.       Kody to follow up with Primary Care provider regarding elevated blood pressure.    It was a pleasure speaking with Prince Pereira  today.       Follow up in yearly FBE        Again, thank you for allowing me to participate in the care of your patient.        Sincerely,        Allie Cade PA-C

## 2021-07-12 NOTE — NURSING NOTE
Bp rechecked. Pt was advised to go to urgent care due to elevated bp. Pt has declined and reported no signs or symptoms of dizziness, chest pain, shortness of breath, or blurry vision.     Pt voiced he will check bp at home again and will contact pcp if bp is still elevated.    Chanell Seo MA

## 2021-07-12 NOTE — PATIENT INSTRUCTIONS
Proper skin care from [facility]:    -Eliminate harsh soaps as they strip the natural oils from the skin, often resulting in dry itchy skin ( i.e. Dial, Zest, Florencia Spring)  -Use mild soaps such as Cetaphil or Dove Sensitive Skin in the shower. You do not need to use soap on arms, legs, and trunk every time you shower unless visibly soiled.   -Avoid hot or cold showers.  -After showering, lightly dry off and apply moisturizing within 2-3 minutes. This will help trap moisture in the skin.   -Aggressive use of a moisturizer at least 1-2 times a day to the entire body (including -Vanicream, Cetaphil, Aquaphor or Cerave) and moisturize hands after every washing.  -We recommend using moisturizers that come in a tub that needs to be scooped out, not a pump. This has more of an oil base. It will hold moisture in your skin much better than a water base moisturizer. The above recommended are non-pore clogging.      Wear a sunscreen with at least SPF 30 on your face, ears, neck and V of the chest daily. Wear sunscreen on other areas of the body if those areas are exposed to the sun throughout the day. Sunscreens can contain physical and/or chemical blockers. Physical blockers are less likely to clog pores, these include zinc oxide and titanium dioxide. Reapply every two hour and after swimming. Sunscreen examples include Neutrogena, CeraVe, Blue Lizard, Elta MD and many others.    UV radiation  UVA radiation remains constant throughout the day and throughout the year. It is a longer wavelength than UVB and therefore penetrates deeper into the skin leading to immediate and delayed tanning, photoaging, and skin cancer. 70-80% of UVA and UVB radiation occurs between the hours of 10am-2pm.  UVB radiation  UVB radiation causes the most harmful effects and is more significant during the summer months. However, snow and ice can reflect UVB radiation leading to skin damage during the winter months as well. UVB radiation is  responsible for tanning, burning, inflammation, delayed erythema (pinkness), pigmentation (brown spots), and skin cancer.     I recommend self monthly full body exams and yearly full body exams with a dermatology provider. If you develop a new or changing lesion please follow up for examination. Most skin cancers are pink and scaly or pink and pearly. However, we do see blue/brown/black skin cancers.  Consider the ABCDEs of melanoma when giving yourself your monthly full body exam ( don't forget the groin, buttocks, feet, toes, etc). A-asymmetry, B-borders, C-color, D-diameter, E-elevation or evolving. If you see any of these changes please follow up in clinic. If you cannot see your back I recommend purchasing a hand held mirror to use with a larger wall mirror.      Onychomycosis:  [email or web address]  OTC products including daily application of tea tree oil, Vicks vapor rub, and/or apple cider vinegar soaks. Disc oral agents and liver function tests required at baseline, 6 weeks after treatment begins, and then once treatment ends. Prescription strength topicals are available, can use up to 48 weeks. Fingernails take 3-6 months to regrow completely, and toenails up to 12-18 months.     Telangiectasia    [facility]   [address], [place] [address]  Phone: [phone]      [facility]  [address] [place] [address] [phone]

## 2021-07-12 NOTE — PROGRESS NOTES
HPI:  I was asked to see pt by ARASH Myles CNP. Prince Pereira is a 65 year old male patient here today for spot on face .  Patient states this has been present for months.  Patient reports the following symptoms: none, just red .  Patient reports the following previous treatments: none.  Patient reports the following modifying factors: none.  Associated symptoms: none.  Patient has no other skin complaints today.  Remainder of the HPI, Meds, PMH, Allergies, FH, and SH was reviewed in chart.    Pertinent Hx:   No personal or family history of skin cancer. PsA on enbrel.     Past Medical History:   Diagnosis Date     Atrial fibrillation (H)     paroxysmal with ablation 2014     Family history of prostate cancer     father  age 60     Herpes zoster 3/26/2013     Almendarez's neuroma of right foot      Psoriasis      psoriatic arthritis      Pulmonary nodules 2014    multiple noncalcified up to 6 mm needs f/u       Past Surgical History:   Procedure Laterality Date     ANESTHESIA CARDIOVERSION  10/6/2011    Procedure:ANESTHESIA CARDIOVERSION; CARDIOVERSION; Surgeon:GENERIC ANESTHESIA PROVIDER; [facility]     ANESTHESIA CARDIOVERSION  2/10/2012    Procedure:ANESTHESIA CARDIOVERSION; CARDIOVERSION; Surgeon:GENERIC ANESTHESIA PROVIDER; [facility]     ANESTHESIA CARDIOVERSION  2013    Procedure: ANESTHESIA CARDIOVERSION;  Cardioversion;  Surgeon: Provider, Generic Anesthesia;  Location: [facility] OR     ANESTHESIA CARDIOVERSION  2013    Procedure: ANESTHESIA CARDIOVERSION;  Cardioversion;  Surgeon: Provider, Generic Anesthesia;  Location: [facility] OR     CATARACT IOL, RT/LT Left 2015     COLONOSCOPY       ENT SURGERY      tonsillectomy     HERNIORRHAPHY INGUINAL Right 7/15/2015    Procedure: HERNIORRHAPHY INGUINAL;  Surgeon: Bk Watts MD;  Location: [facility] OR     Left Atrial Wide Area Circumferential radiofrequency ablation  2014      for atrial fib      PHACOEMULSIFICATION CLEAR CORNEA WITH STANDARD INTRAOCULAR LENS IMPLANT Right 2015    Procedure: PHACOEMULSIFICATION CLEAR CORNEA WITH STANDARD INTRAOCULAR LENS IMPLANT;  Surgeon: Petr Moyer MD;  Location: [facility]        Family History   Problem Relation Age of Onset     C.A.D. Maternal Grandfather          age 63     Hypertension Maternal Grandfather      Hypertension Mother      Alzheimer Disease Mother      Prostate Cancer Father          age 60 agressive prostate ca     Alzheimer Disease Maternal Grandmother      Glaucoma No family hx of      Macular Degeneration No family hx of        Social History     Socioeconomic History     Marital status:      Spouse name: Not on file     Number of children: Not on file     Years of education: Not on file     Highest education level: Not on file   Occupational History     Occupation: Psychiatrist     Employer: [employer]   Tobacco Use     Smoking status: Former Smoker     Years: 1.00     Types: Cigarettes     Quit date: 1980     Years since quittin.5     Smokeless tobacco: Never Used   Substance and Sexual Activity     Alcohol use: Yes     Alcohol/week: 8.3 standard drinks     Types: 10 Standard drinks or equivalent per week     Comment: occasional     Drug use: No     Sexual activity: Yes     Partners: Female   Other Topics Concern      Service Not Asked     Blood Transfusions Not Asked     Caffeine Concern Not Asked     Occupational Exposure Not Asked     Hobby Hazards Not Asked     Sleep Concern Not Asked     Stress Concern Not Asked     Weight Concern Not Asked     Special Diet Not Asked     Back Care Not Asked     Exercise Yes     Comment: tennis, walking     Bike Helmet Not Asked     Seat Belt Not Asked     Self-Exams Not Asked     Parent/sibling w/ CABG, MI or angioplasty before 65F 55M? 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[place]     Social Determinants of Health      Financial Resource Strain:      Difficulty of Paying Living Expenses:    Food Insecurity:      Worried About Running Out of Food in the Last Year:      Ran Out of Food in the Last Year:    Transportation Needs:      Lack of Transportation (Medical):      Lack of Transportation (Non-Medical):    Physical Activity:      Days of Exercise per Week:      Minutes of Exercise per Session:    Stress:      Feeling of Stress :    Social Connections:      Frequency of Communication with Friends and Family:      Frequency of Social Gatherings with Friends and Family:      Attends Voodoo Services:      Active Member of Clubs or Organizations:      Attends Club or Organization Meetings:      Marital Status:    Intimate Partner Violence:      Fear of Current or Ex-Partner:      Emotionally Abused:      Physically Abused:      Sexually Abused:        Outpatient Encounter Medications as of 7/12/2021   Medication Sig Dispense Refill     atorvastatin (LIPITOR) 20 MG tablet Take 1 tablet (20 mg) by mouth daily 90 tablet 3     etanercept (ENBREL) 50 MG/ML injection Inject 0.98 mLs (50 mg) Subcutaneous every 10 days Inject 0.98 mLs / 50mg every 10 days. Prefilled syringeHold for signs of infection, and then seek medical attention. 3 mL 11     metoprolol succinate ER (TOPROL-XL) 25 MG 24 hr tablet Take 1 tablet (25 mg) by mouth daily 90 tablet 0     No facility-administered encounter medications on file as of 7/12/2021.       Review Of Systems:  Skin: spot on face  Eyes: negative  Ears/Nose/Throat: negative  Respiratory: No shortness of breath, dyspnea on exertion, cough, or hemoptysis  Cardiovascular: negative  Gastrointestinal: negative  Genitourinary: negative  Musculoskeletal: negative  Neurologic: negative  Psychiatric: negative  Hematologic/Lymphatic/Immunologic: negative  Endocrine: negative      Objective:     There were no vitals taken for this visit.  Eyes: Conjunctivae/lids: Normal   ENT: Lips:  Normal  MSK:  Normal  Cardiovascular: Peripheral edema none  Pulm: Breathing Normal  Neuro/Psych: Orientation: A/O x 3. Normal; Mood/Affect: Normal, NAD, WDWN  Pt accompanied by: self  Following areas examined: Scalp, face, eyelids, lips, neck, chest, abdomen, back, and R&L upper and lower extremities,  buttock, hips, pt defers exam groin and genitals.   Mcgill skin type:ii   Findings:  Red smooth well-defined macules on trunk and extremities.  Brown, stuck-on scaly appearing papules on trunk and extremities.  Well circumscribed macules with symmetric color distribution on trunk and extremities.  Tan WD smooth macules on face, neck, trunk, and extremities.  Red smooth linear macule on left medial cheek   Yellowing and thickening of great toenails  Assessment and Plan:     1) Cherry angiomas, Seborrheic keratoses, Benign nevi, Lentigines,  telangiectasia     I discussed the specifics of tumor, prognosis, and genetics of benign lesions.  I explained that treatment of these lesions would be purely cosmetic and not medically neccessary.  I discussed with patient different removal options including excision, cryotherapy, cautery and /or laser.  Lesion may recur and/or may not completely resolve. May need additional treatment.     2) Onychomycosis:  Disc oral and topical rx as well as OTC products including daily application of tea tree oil, Vicks vapor rub, and/or apple cider vinegar soaks. Disc oral agents and liver function tests required at baseline, 6 weeks after treatment begins, and then once treatment ends. Prescription strength topicals are available, can use up to 48 weeks. Fingernails take 3-6 months to regrow completely, and toenails up to 12-18 months.   3) hx of PsA on enbrel  Per pt minimal cutaneous findings. States he flares with a few patches now and then.   Signs and Symptoms of non-melanoma skin cancer and ABCDEs of melanoma reviewed with patient. Patient encouraged to perform monthly self skin exams and  educated on how to perform them. UV precautions reviewed with patient. Patient was asked about new or changing moles/lesions on body.   Wear a sunscreen with at least SPF 30 on your face, ears, neck and V of the chest daily. Wear sunscreen on other areas of the body if those areas are exposed to the sun throughout the day. Sunscreens can contain physical and/or chemical blockers. Physical blockers are less likely to clog pores, these include zinc oxide and titanium dioxide. Reapply every two hour and after swimming. Sunscreen examples include Neutrogena, CeraVe, Blue Lizard, Elta MD and many others.    Proper skin care from [facility]:    -Eliminate harsh soaps as they strip the natural oils from the skin, often resulting in dry itchy skin ( i.e. Dial, Zest, [place])  -Use mild soaps such as Cetaphil or Dove Sensitive Skin in the shower. You do not need to use soap on arms, legs, and trunk every time you shower unless visibly soiled.   -Avoid hot or cold showers.  -After showering, lightly dry off and apply moisturizing within 2-3 minutes. This will help trap moisture in the skin.   -Aggressive use of a moisturizer at least 1-2 times a day to the entire body (including -Vanicream, Cetaphil, Aquaphor or Cerave) and moisturize hands after every washing.  -We recommend using moisturizers that come in a tub that needs to be scooped out, not a pump. This has more of an oil base. It will hold moisture in your skin much better than a water base moisturizer. The above recommended are non-pore clogging.       Kody to follow up with Primary Care provider regarding elevated blood pressure.    It was a pleasure speaking with Prince Pereira today.       Follow up in yearly FBE

## 2021-07-29 ENCOUNTER — TELEPHONE (OUTPATIENT)
Dept: CARDIOLOGY | Facility: CLINIC | Age: 66
End: 2021-07-29

## 2021-07-29 NOTE — TELEPHONE ENCOUNTER
Health Call Center    Phone Message    May a detailed message be left on voicemail: no     Reason for Call: Other: Kody called to report he is in [place], he would like to speak to amember of the care team. He is currently at work, so please call [phone] and if he is unable to answer leave a detailed VM.      Action Taken: Message routed to:  [facility] ([facility] Cardiology    Travel Screening: Not Applicable

## 2021-07-29 NOTE — TELEPHONE ENCOUNTER
Called pt back to ask about Afib. Pt reports feeling off when up walking around this morning around 8:30, noted to be in Afib. States the onset was not as noticeable as his previous episodes. He feel this is due to his metoprolol. States he spontaneously converted out of Afib around 11:45.    This is the first Afib episode he has had since last visit in June 2020. States he would like to make an appointment with Dr. Rodriguez.    I notified Dr. Rodriguez of Afib episode. She would like to see pt next week. Will get this appointment scheduled.

## 2021-08-06 ENCOUNTER — TELEPHONE (OUTPATIENT)
Dept: CARDIOLOGY | Facility: CLINIC | Age: 66
End: 2021-08-06

## 2021-08-06 NOTE — TELEPHONE ENCOUNTER
Called and talked with pt's wife and she wrote down the number for him to call back since he was with a patient. Told him he needs to schedule with john quan either video/ telephone ( sooner dates) otherwise I think there's some openings on 8/26?     Please schedule

## 2021-08-06 NOTE — TELEPHONE ENCOUNTER
----- Message from Ana Bhatti sent at 8/3/2021  2:03 PM CDT -----    ----- Message -----  From: Yakelin Lopez RN  Sent: 7/29/2021   2:57 PM CDT  To: Ana Bhatti    Video or phone will be fine!      Thank you,     Niels Lopez RN   Cardiology Nurse Coordinator     ----- Message -----  From: Ana Bhatti  Sent: 7/29/2021   1:25 PM CDT  To: Yakelin Lopez RN    First in person is 8/26 unless you want us to do video/ telephone    ----- Message -----  From: Yakelin Lopez RN  Sent: 7/29/2021   1:21 PM CDT  To: [facility]    Hello,    Can you reach out to pt and get him scheduled for an appointment with Dr. Rodriguez next week?    Niels Lopez RN   Cardiology Nurse Coordinator     ----- Message -----  From: Vera Rodriguez MD  Sent: 7/29/2021   1:13 PM CDT  To: Yakelin Lopez RN    I can see him in clinic next week. No pretesting needed  ----- Message -----  From: Yakelin Lopze RN  Sent: 7/29/2021  12:39 PM CDT  To: Vera Rodriguez MD    Pt called d/t being in Afib. States he noticed he was in Afib around 8:30 this AM. He states the onset wasn't as prominent, he believes his metoprolol was controlling his heart rate. But either way he noted he was in Afib around that time and spontaneously converted around 11:45AM today.     This is the first Afib episode he has had since his last visit in June 2020. He states he would like to make an appointment with you. He is in clinic today so that will not work. Do you want any testing done before making appointment?    Niels Lopez RN   Cardiology Nurse Coordinator

## 2021-08-09 NOTE — PROGRESS NOTES
"Electrophysiology Clinic Video Virtual Visit    Prince Ghosh Mabscott is a 65 year old male who is being evaluated via a billable video visit.      The patient has been notified of following:     \"This video visit will be conducted via a call between you and your physician/provider. We have found that certain health care needs can be provided without the need for an in-person physical exam.  This service lets us provide the care you need with a video conversation.  If a prescription is necessary we can send it directly to your pharmacy.  If lab work is needed we can place an order for that and you can then stop by our lab to have the test done at a later time.    If during the course of the call the physician/provider feels a video visit is not appropriate, you will not be charged for this service.\"     Physician has received verbal consent for a Video Visit from the patient? Yes    Patient would like the video invitation sent by: [employer]    Video Start Time: 5:40    Video Stop Time: 6:10 pm    Mode of Communication:  Video Conference via [employer]    Originating Location (pt. 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 I had a video visit with him 6/9/2020.     He underwent pulmonary vein isolation for AF on 2/13/2014 and warfarin/metoprolol were discontinued 4/2014. He's had two episodes of recurrent AF since 2014: one in setting of severe back pain lasted 4 hours May 2017; the other one on 5/23/2020 when he was on vacation: it was the morning, he had been running pretty hard back and forth from his cabin to the boat, carrying fishing gear, then had half a bloody lex: he thinks AF started with all the running and not with the bloody lex. He checked his pulse and it was irregular at about 120 bpm, lasted 2-3 hours total. " He had some metoprolol XL 25 mg and flecainide from 2017, took one of metoprolol. During the time he was in AF he continued to complete his morning activities. Other than irregular and fullness when he's in afib, he has no associated chest discomfort, dyspnea, dizziness.    Since I last saw him he was started on metoprolol daily for hypertension.  He had no further episodes of AF until 2021 - he was at work when he felt an irregular rhythm consistent with his AF, his pulse was around 100 bpm. He did not feel palpitations as much as he did previously. Episode lasted about 3 hours and terminated spontaneously. He also had previously symptoms of feeling intermittent pauses, likely PVCs, which have completely resolved after starting metoprolol. He has mild fatigue on metoprolol but thinks it's improving.    He does check his BP at home and reports that it is usually 120/70.     PCP office vital signs 2021: 120/80, HR 53    PAST MEDICAL HISTORY:  1. Atrial fibrillation, first episode , s/p PVI 2014, prolonged hospital stay due to femoral hematoma. Multiple cardioversions prior to PVI. Recurrent  and May 2020, each time 2-4 hours. Recurrent , 3 hours.  2. Pulmonary nodules  3. Psoriatic arthritis  4. Herpes zoster      CURRENT MEDICATIONS:  Atorvastatin 20 every day  Metoprolol succinate 25 mg qd  Enbrel injections      ALLERGIES:     Allergies   Allergen Reactions     Other [No Clinical Screening - See Comments] Rash     Cardioversion pads cause rash.       FAMILY HISTORY:  Family History   Problem Relation Age of Onset     C.A.D. Maternal Grandfather          age 63     Hypertension Maternal Grandfather      Hypertension Mother      Alzheimer Disease Mother      Prostate Cancer Father          age 60 agressive prostate ca     Alzheimer Disease Maternal Grandmother      Glaucoma No family hx of      Macular Degeneration No family hx of        SOCIAL HISTORY:  Social History      Tobacco Use     Smoking status: Former Smoker     Years: 1.00     Types: Cigarettes     Quit date: 1980     Years since quittin.6     Smokeless tobacco: Never Used   Substance Use Topics     Alcohol use: Yes     Alcohol/week: 8.3 standard drinks     Types: 10 Standard drinks or equivalent per week     Comment: occasional     Drug use: No       ROS:  10 point ROS neg other than the symptoms noted above in the HPI.    I have personally reviewed the following:  ECHOCARDIOGRAM:  2014: normal EF normal atria     EK/15/2017: sinus 70 bpm, normal intervals    LABS:  2021: cholesterol 124, TG 45, HDL 52, LDL 63 (on atorvastatin)  K 4.6, cr 1.10      Exam:  The rest of a comprehensive physical examination is deferred due to public health emergency video visit restrictions.  CONSITUTIONAL: no acute distress  HEENT: no icterus, no redness or discharge, neck supple  CV: no visible edema of visualized extremities. No JVD.   RESPIRATORY: respirations nonlabored, no cough  NEURO: AA&Ox3, speech fluent/appropriate, motor grossly nonfocal  PSYCH: cooperative, affect appropriate  DERM: no rashes on visualized face/neck/upper extremities      Assessment and Plan:   1. Atrial fibrillation, paroxysmal, h/o PVI , with ongoing infrequent episodes, most recently episode 2021. AF felt better on metoprolol. [id number] is now 2 for age >65 and HTN, he now meets indication for chronic anticoagulation.  Discussed risks and benefits with him. He is wondering if he might be having asymptomatic episodes and whether that should alter recommendations for anticoagulation. I did discuss with him that stroke risk is not related to AF burden. However, if he should be having asymptomatic AF with rapid rates, this would  in that I would be more aggressive with rate and/or rhythm control.  2. Hypertension. Recommend goal < 120/80. Can try taking metoprolol at bedtime to see if fatigue improves. If more  BP control needed in future, would switch to carvedilol.  3. Hyperlipidemia. Improved on atorvastatin.      Recommend:  1. 14 day patch monitor for asymptomatic AF detection  2. Consider anticoagulation (Eliquis 5 mg bid or Xarelto 20 every day)  3. Continue metoprolol succinate 25 mg qam for now  4. Follow home BP    I will set up an appt after reviewing patch monitor results.      In addition to video time documented above, I spent an additional 20 minutes today on data review and documentation.    I have reviewed the note as documented above.  This accurately captures the substance of my virtual visit with the patient. The patient states understanding and is agreeable with the plan.     Vera Rodriguez MD  Cardiology

## 2021-08-10 ENCOUNTER — VIRTUAL VISIT (OUTPATIENT)
Dept: CARDIOLOGY | Facility: CLINIC | Age: 66
End: 2021-08-10
Attending: INTERNAL MEDICINE
Payer: COMMERCIAL

## 2021-08-10 DIAGNOSIS — I10 ESSENTIAL HYPERTENSION: ICD-10-CM

## 2021-08-10 DIAGNOSIS — E78.2 MIXED HYPERLIPIDEMIA: ICD-10-CM

## 2021-08-10 DIAGNOSIS — I48.0 PAROXYSMAL ATRIAL FIBRILLATION (H): Primary | ICD-10-CM

## 2021-08-10 PROCEDURE — 99214 OFFICE O/P EST MOD 30 MIN: CPT | Mod: 95 | Performed by: INTERNAL MEDICINE

## 2021-08-10 RX ORDER — ATORVASTATIN CALCIUM 20 MG/1
20 TABLET, FILM COATED ORAL DAILY
COMMUNITY
End: 2022-02-09

## 2021-08-10 NOTE — PROGRESS NOTES
"Kody is a 65 year old who is being evaluated via a billable video visit.      How would you like to obtain your AVS? MyChart  If the video visit is dropped, the invitation should be resent by: Text to cell phone: [phone]  Will anyone else be joining your video visit? No      Vitals - Patient Reported  Systolic (Patient Reported): 123  Diastolic (Patient Reported): 74  Weight (Patient Reported): 72.6 kg (160 lb)  Height (Patient Reported): 177.8 cm (5' 10\")  BMI (Based on Pt Reported Ht/Wt): 22.96  Pain Score: No Pain (0) (No SOB)      Vitals were taken and medications where reconciled.   Noé Calixto, EMT  5:22 PM    "

## 2021-08-10 NOTE — LETTER
"8/10/2021      RE: Prince GABRIEL Augie Del Rosarioann  [address]  [facility] [address]       Dear Colleague,    Thank you for the opportunity to participate in the care of your patient, Prince Pereira, at the [facility] HEART CLINIC [place] [facility]. Please see a copy of my visit note below.    Electrophysiology Clinic Video Virtual Visit    Prince Pereira is a 65 year old male who is being evaluated via a billable video visit.      The patient has been notified of following:     \"This video visit will be conducted via a call between you and your physician/provider. We have found that certain health care needs can be provided without the need for an in-person physical exam.  This service lets us provide the care you need with a video conversation.  If a prescription is necessary we can send it directly to your pharmacy.  If lab work is needed we can place an order for that and you can then stop by our lab to have the test done at a later time.    If during the course of the call the physician/provider feels a video visit is not appropriate, you will not be charged for this service.\"     Physician has received verbal consent for a Video Visit from the patient? Yes    Patient would like the video invitation sent by: [employer]    Video Start Time: 5:40    Video Stop Time: 6:10 pm    Mode of Communication:  Video Conference via [employer]    Originating Location (pt. Location): Home    Distant Location (provider location): [facility]    Mode of Communication:  Video Conference via 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 I had a video visit with him 6/9/2020.     He underwent pulmonary vein isolation for AF on 2/13/2014 and warfarin/metoprolol were discontinued 4/2014. He's had two episodes of recurrent AF since 2014: one in setting of severe back pain lasted 4 hours " May 2017; the other one on 5/23/2020 when he was on vacation: it was the morning, he had been running pretty hard back and forth from his cabin to the boat, carrying fishing gear, then had half a bloody lex: he thinks AF started with all the running and not with the bloody lex. He checked his pulse and it was irregular at about 120 bpm, lasted 2-3 hours total. He had some metoprolol XL 25 mg and flecainide from 2017, took one of metoprolol. During the time he was in AF he continued to complete his morning activities. Other than irregular and fullness when he's in afib, he has no associated chest discomfort, dyspnea, dizziness.    Since I last saw him he was started on metoprolol daily for hypertension.  He had no further episodes of AF until 7/29/2021 - he was at work when he felt an irregular rhythm consistent with his AF, his pulse was around 100 bpm. He did not feel palpitations as much as he did previously. Episode lasted about 3 hours and terminated spontaneously. He also had previously symptoms of feeling intermittent pauses, likely PVCs, which have completely resolved after starting metoprolol. He has mild fatigue on metoprolol but thinks it's improving.    He does check his BP at home and reports that it is usually 120/70.     PCP office vital signs 7/1/2021: 120/80, HR 53    PAST MEDICAL HISTORY:  1. Atrial fibrillation, first episode 1987, s/p PVI 2/13/2014, prolonged hospital stay due to femoral hematoma. Multiple cardioversions prior to PVI. Recurrent 2017 and May 2020, each time 2-4 hours. Recurrent 7/29/201, 3 hours.  2. Pulmonary nodules  3. Psoriatic arthritis  4. Herpes zoster      CURRENT MEDICATIONS:  Atorvastatin 20 every day  Metoprolol succinate 25 mg qd  Enbrel injections      ALLERGIES:     Allergies   Allergen Reactions     Other [No Clinical Screening - See Comments] Rash     Cardioversion pads cause rash.       FAMILY HISTORY:  Family History   Problem Relation Age of Onset     C.A.D.  Maternal Grandfather          age 63     Hypertension Maternal Grandfather      Hypertension Mother      Alzheimer Disease Mother      Prostate Cancer Father          age 60 agressive prostate ca     Alzheimer Disease Maternal Grandmother      Glaucoma No family hx of      Macular Degeneration No family hx of        SOCIAL HISTORY:  Social History     Tobacco Use     Smoking status: Former Smoker     Years: 1.00     Types: Cigarettes     Quit date: 1980     Years since quittin.6     Smokeless tobacco: Never Used   Substance Use Topics     Alcohol use: Yes     Alcohol/week: 8.3 standard drinks     Types: 10 Standard drinks or equivalent per week     Comment: occasional     Drug use: No       ROS:  10 point ROS neg other than the symptoms noted above in the HPI.    I have personally reviewed the following:  ECHOCARDIOGRAM:  2014: normal EF normal atria     EK/15/2017: sinus 70 bpm, normal intervals    LABS:  2021: cholesterol 124, TG 45, HDL 52, LDL 63 (on atorvastatin)  K 4.6, cr 1.10      Exam:  The rest of a comprehensive physical examination is deferred due to public health emergency video visit restrictions.  CONSITUTIONAL: no acute distress  HEENT: no icterus, no redness or discharge, neck supple  CV: no visible edema of visualized extremities. No JVD.   RESPIRATORY: respirations nonlabored, no cough  NEURO: AA&Ox3, speech fluent/appropriate, motor grossly nonfocal  PSYCH: cooperative, affect appropriate  DERM: no rashes on visualized face/neck/upper extremities      Assessment and Plan:   1. Atrial fibrillation, paroxysmal, h/o PVI , with ongoing infrequent episodes, most recently episode 2021. AF felt better on metoprolol. [id number] is now 2 for age >65 and HTN, he now meets indication for chronic anticoagulation.  Discussed risks and benefits with him. He is wondering if he might be having asymptomatic episodes and whether that should alter recommendations for  anticoagulation. I did discuss with him that stroke risk is not related to AF burden. However, if he should be having asymptomatic AF with rapid rates, this would  in that I would be more aggressive with rate and/or rhythm control.  2. Hypertension. Recommend goal < 120/80. Can try taking metoprolol at bedtime to see if fatigue improves. If more BP control needed in future, would switch to carvedilol.  3. Hyperlipidemia. Improved on atorvastatin.      Recommend:  1. 14 day patch monitor for asymptomatic AF detection  2. Consider anticoagulation (Eliquis 5 mg bid or Xarelto 20 every day)  3. Continue metoprolol succinate 25 mg qam for now  4. Follow home BP    I will set up an appt after reviewing patch monitor results.      In addition to video time documented above, I spent an additional 20 minutes today on data review and documentation.    I have reviewed the note as documented above.  This accurately captures the substance of my virtual visit with the patient. The patient states understanding and is agreeable with the plan.     Vera Rodriguez MD  Cardiology

## 2021-08-10 NOTE — PATIENT INSTRUCTIONS
"You were evaluated today in the Cardiovascular Telemedicine Clinic at the [facility].     Cardiology Provider during your visit: Dr. Vera Rodriguez    Diagnosis: atrial fibrillation, hypertension    Results: discussed with patient    Orders:   None    Medication Changes:   None    Recommendations:   Consider anticoagulation - either Eliquis 5 mg twice a day or Xarelto 20 once a day  Try taking metoprolol at bedtime to see if fatigue improves  I will send you a 14-day monitor    Follow-up:   After monitor results are available    Please follow up with primary care provider for medication refills         Please feel free to call me with any questions or concerns.     Yakelin Tran RN     Questions and [phone].   First press #1 for the "[employer] and then press #4 for \"Medical Questions\" to reach us Cardiology Nurses.      On Call Cardiologist for after hours or on weekends: [phone]   option #4 and ask to speak to the on-call Cardiologist.          If you need a medication refill please contact your pharmacy.  Please allow 3 business days for your refill to be completed.   "

## 2021-08-11 NOTE — NURSING NOTE
No medication changes for now. Take Metoprolol at night to see if this improves fatigue.     14 day ziopatch monitor. Start anticoagulation based on results.     Niels Tran, RN   Cardiology Nurse Coordinator

## 2021-08-27 ENCOUNTER — ALLIED HEALTH/NURSE VISIT (OUTPATIENT)
Dept: CARDIOLOGY | Facility: CLINIC | Age: 66
End: 2021-08-27
Attending: INTERNAL MEDICINE
Payer: COMMERCIAL

## 2021-08-27 DIAGNOSIS — I48.0 PAROXYSMAL ATRIAL FIBRILLATION (H): ICD-10-CM

## 2021-08-27 PROCEDURE — 93248 EXT ECG>7D<15D REV&INTERPJ: CPT | Performed by: INTERNAL MEDICINE

## 2021-09-12 ENCOUNTER — HEALTH MAINTENANCE LETTER (OUTPATIENT)
Age: 66
End: 2021-09-12

## 2021-10-12 PROBLEM — E78.00 HYPERCHOLESTEREMIA: Status: ACTIVE | Noted: 2021-01-21

## 2021-10-12 PROBLEM — K64.4 EXTERNAL HEMORRHOIDS: Status: ACTIVE | Noted: 2021-10-12

## 2021-10-12 PROBLEM — D22.9 ATYPICAL NEVUS: Status: ACTIVE | Noted: 2021-10-12

## 2021-10-12 PROBLEM — I10 HYPERTENSION: Status: ACTIVE | Noted: 2021-05-13

## 2021-10-12 PROBLEM — D17.9 LIPOMA: Status: ACTIVE | Noted: 2021-10-12

## 2021-10-14 ENCOUNTER — VIRTUAL VISIT (OUTPATIENT)
Dept: CARDIOLOGY | Facility: CLINIC | Age: 66
End: 2021-10-14
Attending: INTERNAL MEDICINE
Payer: COMMERCIAL

## 2021-10-14 DIAGNOSIS — I48.0 PAROXYSMAL ATRIAL FIBRILLATION (H): ICD-10-CM

## 2021-10-14 DIAGNOSIS — I10 BENIGN ESSENTIAL HYPERTENSION: Primary | ICD-10-CM

## 2021-10-14 PROCEDURE — 99215 OFFICE O/P EST HI 40 MIN: CPT | Mod: 95 | Performed by: INTERNAL MEDICINE

## 2021-10-14 RX ORDER — LOSARTAN POTASSIUM 50 MG/1
25 TABLET ORAL DAILY
Qty: 30 TABLET | Refills: 3 | Status: SHIPPED | OUTPATIENT
Start: 2021-10-14 | End: 2022-02-09

## 2021-10-14 NOTE — PATIENT INSTRUCTIONS
"You were seen today in the Cardiovascular Clinic at the [facility].     Cardiology Providers you saw during your visit: Dr. Vera Rodriguez    Diagnosis:  Atrial fibrillation, high blood pressure    Results: discussed with patient; no AFib on patch monitor    Orders:   None    Medication Changes:   Stop metoprolol  Begin Losartan 25 mg once a day    Recommendations:   2D echo  Send BP log in 1-2 weeks    Follow-up:     Please follow up with primary care provider for medication refills         Please feel free to call me with any questions or concerns.       Yakelin Tran RN     Questions and [phone].   First press #1 for the [employer] and then press #4 for \"Medical Questions\" to reach us Cardiology Nurses.      On Call Cardiologist for after hours or on weekends: [phone]   option #4 and ask to speak to the on-call Cardiologist.          If you need a medication refill please contact your pharmacy.  Please allow 3 business days for your refill to be completed.    "

## 2021-10-14 NOTE — LETTER
"10/14/2021       RE: Prince GABRIEL Augie Del Rosarioann  [address]  [facility] [address]       Dear Colleague,    Thank you for the opportunity to participate in the care of your patient, Prince Pereira, at the [facility] [place] at [facility]. Please see a copy of my visit note below.    Electrophysiology Clinic Video Virtual Visit    Prince Pereira is a 66 year old male who is being evaluated via a billable video visit.      The patient has been notified of following:     \"This video visit will be conducted via a call between you and your physician/provider. We have found that certain health care needs can be provided without the need for an in-person physical exam.  This service lets us provide the care you need with a video conversation.  If a prescription is necessary we can send it directly to your pharmacy.  If lab work is needed we can place an order for that and you can then stop by our lab to have the test done at a later time.    If during the course of the call the physician/provider feels a video visit is not appropriate, you will not be charged for this service.\"     Physician has received verbal consent for a Video Visit from the patient? Yes    Patient would like the video invitation sent by: [employer]    Video Start Time: 5:30 pm    Video Stop Time: 6pm    Mode of Communication:  Video Conference via [employer]    Originating Location (pt. 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 I had a video visit with him 8/10/2021.     He underwent pulmonary vein isolation for AF on 2/13/2014 and warfarin/metoprolol were discontinued 4/2014. He's had two episodes of recurrent AF since 2014: one in setting of severe back pain lasted 4 " hours May 2017; the other one on 2020 when he was on vacation, carrying fishing gear, lasted 2-3 hours. He had no further episodes of AF until 2021 - he was at work when he felt an irregular rhythm consistent with his AF, his pulse was around 100 bpm. He did not feel palpitations as much as he did previously. Episode lasted about 3 hours and terminated spontaneously.     At our last visit, we discussed anticoagulation since he is now over 65 and has hypertension. He asked about a monitor to evaluate asymptomatic AF, so a 14 day patch monitor was ordered.  He had no symptoms during the monitoring period. He is still hesitant about anticoagulation. He does have hemorrhoids which bleed occasionally and is concerned that it might be worse on anticoagulation. He is also monitoring his BP, and reports that last few months BP ~ 130/80, highest in the 140s.      PAST MEDICAL HISTORY:  1. Atrial fibrillation, first episode , s/p PVI 2014, prolonged hospital stay due to femoral hematoma. Multiple cardioversions prior to PVI. Recurrent  and May 2020, each time 2-4 hours. Recurrent 2021, 3 hours.  2. Pulmonary nodules  3. Psoriatic arthritis  4. Herpes zoster    CURRENT MEDICATIONS:  Atorvastatin 20 every day  Metoprolol succinate 25 mg qd  Enbrel injections    ALLERGIES:     Allergies   Allergen Reactions     Other [No Clinical Screening - See Comments] Rash     Cardioversion pads cause rash.       FAMILY HISTORY:  Family History   Problem Relation Age of Onset     C.A.D. Maternal Grandfather          age 63     Hypertension Maternal Grandfather      Hypertension Mother      Alzheimer Disease Mother      Prostate Cancer Father          age 60 agressive prostate ca     Alzheimer Disease Maternal Grandmother      Glaucoma No family hx of      Macular Degeneration No family hx of        SOCIAL HISTORY:  Social History     Tobacco Use     Smoking status: Former Smoker     Years: 1.00     Types:  Cigarettes     Quit date: 1980     Years since quittin.8     Smokeless tobacco: Never Used   Substance Use Topics     Alcohol use: Yes     Alcohol/week: 8.3 standard drinks     Types: 10 Standard drinks or equivalent per week     Comment: occasional     Drug use: No       ROS:  10 point ROS neg other than the symptoms noted above in the HPI.    I have personally reviewed the following:  ECHOCARDIOGRAM:  2014: normal EF normal atria     EK/15/2017: sinus 70 bpm, normal intervals     LABS:  2021: cholesterol 124, TG 45, HDL 52, LDL 63 (on atorvastatin)  K 4.6, cr 1.10    PATCH MONITOR -2021:  Sinus average 71 bpm, range  bpm.  PVCs 2%, PACs <1%.  Patient triggered events = PACs and PVCs (accidentally - he did not intentionally trigger any specific events)  NSVT 6 beats  at 3:41 pm - no symptoms.  Runs of PACs longest 31 seconds at 122 bpm, no symptoms.  No AFib detected.        Exam:  The rest of a comprehensive physical examination is deferred due to public health emergency video visit restrictions.  CONSITUTIONAL: no acute distress  HEENT: no icterus, no redness or discharge, neck supple  CV: no visible edema of visualized extremities. No JVD.   RESPIRATORY: respirations nonlabored, no cough  NEURO: AA&Ox3, speech fluent/appropriate, motor grossly nonfocal  PSYCH: cooperative, affect appropriate  DERM: no rashes on visualized face/neck/upper extremities      Assessment and Plan:   1. Atrial fibrillation, paroxysmal, h/o PVI , with ongoing infrequent episodes, [id number] is  2 for age >65 and HTN, he meets indication for chronic anticoagulation.  Discussed risks and benefits with him. He is still considering. He also wanted to know if he would still be a candidate for redo ablation if he has more symptomatic episodes, and I reassured him that he would be. Will order 2D echo evaluate LA chamber size.  2. Hypertension. Recommend goal < 120/80. He is fatigued on metoprolol.  I reviewed his records. His AF ventricular rates are  bpm. He does not require metoprolol for rate control. It was apparently given to him a while ago for hypertension. I recommend switching to losartan.   3. Hyperlipidemia. Improved on atorvastatin.     Recommendations:  1. Stop metoprolol  2. Begin losartan 25 every day  3. Send me BP log in 1 week and we can increase as needed  4. Repeat BMP 1 month after losartan  5. Consider apixaban 5 bid    In addition to video time documented above, I spent an additional 20 minutes today on data review and documentation.      I have reviewed the note as documented above.  This accurately captures the substance of my virtual visit with the patient. The patient states understanding and is agreeable with the plan.     Vera Rodriguez MD  Cardiology

## 2021-10-14 NOTE — PROGRESS NOTES
"Electrophysiology Clinic Video Virtual Visit    Prince Ghosh Lapine is a 66 year old male who is being evaluated via a billable video visit.      The patient has been notified of following:     \"This video visit will be conducted via a call between you and your physician/provider. We have found that certain health care needs can be provided without the need for an in-person physical exam.  This service lets us provide the care you need with a video conversation.  If a prescription is necessary we can send it directly to your pharmacy.  If lab work is needed we can place an order for that and you can then stop by our lab to have the test done at a later time.    If during the course of the call the physician/provider feels a video visit is not appropriate, you will not be charged for this service.\"     Physician has received verbal consent for a Video Visit from the patient? Yes    Patient would like the video invitation sent by: [employer]    Video Start Time: 5:30 pm    Video Stop Time: 6pm    Mode of Communication:  Video Conference via [employer]    Originating Location (pt. 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 I had a video visit with him 8/10/2021.     He underwent pulmonary vein isolation for AF on 2/13/2014 and warfarin/metoprolol were discontinued 4/2014. He's had two episodes of recurrent AF since 2014: one in setting of severe back pain lasted 4 hours May 2017; the other one on 5/23/2020 when he was on vacation, carrying fishing gear, lasted 2-3 hours. He had no further episodes of AF until 7/29/2021 - he was at work when he felt an irregular rhythm consistent with his AF, his pulse was around 100 bpm. He did not feel palpitations as much as he did previously. Episode lasted about 3 hours and terminated " spontaneously.     At our last visit, we discussed anticoagulation since he is now over 64 and has hypertension. He asked about a monitor to evaluate asymptomatic AF, so a 14 day patch monitor was ordered.  He had no symptoms during the monitoring period. He is still hesitant about anticoagulation. He does have hemorrhoids which bleed occasionally and is concerned that it might be worse on anticoagulation. He is also monitoring his BP, and reports that last few months BP ~ 130/80, highest in the 140s.      PAST MEDICAL HISTORY:  1. Atrial fibrillation, first episode , s/p PVI 2014, prolonged hospital stay due to femoral hematoma. Multiple cardioversions prior to PVI. Recurrent  and May 2020, each time 2-4 hours. Recurrent 2021, 3 hours.  2. Pulmonary nodules  3. Psoriatic arthritis  4. Herpes zoster    CURRENT MEDICATIONS:  Atorvastatin 20 every day  Metoprolol succinate 25 mg qd  Enbrel injections    ALLERGIES:     Allergies   Allergen Reactions     Other [No Clinical Screening - See Comments] Rash     Cardioversion pads cause rash.       FAMILY HISTORY:  Family History   Problem Relation Age of Onset     C.A.D. Maternal Grandfather          age 63     Hypertension Maternal Grandfather      Hypertension Mother      Alzheimer Disease Mother      Prostate Cancer Father          age 60 agressive prostate ca     Alzheimer Disease Maternal Grandmother      Glaucoma No family hx of      Macular Degeneration No family hx of        SOCIAL HISTORY:  Social History     Tobacco Use     Smoking status: Former Smoker     Years: 1.00     Types: Cigarettes     Quit date: 1980     Years since quittin.8     Smokeless tobacco: Never Used   Substance Use Topics     Alcohol use: Yes     Alcohol/week: 8.3 standard drinks     Types: 10 Standard drinks or equivalent per week     Comment: occasional     Drug use: No       ROS:  10 point ROS neg other than the symptoms noted above in the HPI.    I have  personally reviewed the following:  ECHOCARDIOGRAM:  2014: normal EF normal atria     EK/15/2017: sinus 70 bpm, normal intervals     LABS:  2021: cholesterol 124, TG 45, HDL 52, LDL 63 (on atorvastatin)  K 4.6, cr 1.10    PATCH MONITOR -2021:  Sinus average 71 bpm, range  bpm.  PVCs 2%, PACs <1%.  Patient triggered events = PACs and PVCs (accidentally - he did not intentionally trigger any specific events)  NSVT 6 beats  at 3:41 pm - no symptoms.  Runs of PACs longest 31 seconds at 122 bpm, no symptoms.  No AFib detected.        Exam:  The rest of a comprehensive physical examination is deferred due to public health emergency video visit restrictions.  CONSITUTIONAL: no acute distress  HEENT: no icterus, no redness or discharge, neck supple  CV: no visible edema of visualized extremities. No JVD.   RESPIRATORY: respirations nonlabored, no cough  NEURO: AA&Ox3, speech fluent/appropriate, motor grossly nonfocal  PSYCH: cooperative, affect appropriate  DERM: no rashes on visualized face/neck/upper extremities      Assessment and Plan:   1. Atrial fibrillation, paroxysmal, h/o PVI , with ongoing infrequent episodes, [id number] is  2 for age >65 and HTN, he meets indication for chronic anticoagulation.  Discussed risks and benefits with him. He is still considering. He also wanted to know if he would still be a candidate for redo ablation if he has more symptomatic episodes, and I reassured him that he would be. Will order 2D echo evaluate LA chamber size.  2. Hypertension. Recommend goal < 120/80. He is fatigued on metoprolol. I reviewed his records. His AF ventricular rates are  bpm. He does not require metoprolol for rate control. It was apparently given to him a while ago for hypertension. I recommend switching to losartan.   3. Hyperlipidemia. Improved on atorvastatin.     Recommendations:  1. Stop metoprolol  2. Begin losartan 25 every day  3. Send me BP log in 1 week and  we can increase as needed  4. Repeat BMP 1 month after losartan  5. Consider apixaban 5 bid    In addition to video time documented above, I spent an additional 20 minutes today on data review and documentation.      I have reviewed the note as documented above.  This accurately captures the substance of my virtual visit with the patient. The patient states understanding and is agreeable with the plan.     Vera Rodriguez MD  Cardiology

## 2021-10-14 NOTE — PROGRESS NOTES
Prince Pereira is a 66 year old who is being evaluated via a billable video visit.      How would you like to obtain your AVS? MyChart  If the video visit is dropped, the invitation should be resent by: Text to cell phone: [phone]  Will anyone else be joining your video visit? No      Vitals - Patient Reported  Systolic (Patient Reported): 135  Diastolic (Patient Reported): 89  Weight (Patient Reported): 73 kg (161 lb)  Pain Score: No Pain (0) (No SOB)    Vitals were taken and medications reconciled.    Julio Cesar Redman, EMT  4:52 PM

## 2021-10-15 NOTE — NURSING NOTE
Stopped Metoprolol  Startred Losartan    Echo ordered. Pt will send updated BP log in 1-2 weeks.     Niels Tran RN   Cardiology Nurse Coordinator

## 2022-01-27 NOTE — PROGRESS NOTES
Kody is a 66 year old who is being evaluated via a billable video visit.      How would you like to obtain your AVS? MyChart  If the video visit is dropped, the invitation should be resent by: Text to cell phone: [phone]  Will anyone else be joining your video visit? No      Video Start Time: 7:31 AM  Video-Visit Details    Type of service:  Video Visit    Video End Time:7:56 AM    Originating Location (pt. Location): Home    Distant Location (provider location):  [facility] RHEUMATOLOGY CLINIC [place]     Platform used for Video Visit: [facility]  Rheumatology Clinic  Steve Pandey MD  2022     Name: Prince Pereira  MRN: [id number]  Age: 66 year old  : 1955  Referring provider: Brendan Ndiaye     Assessment and Plan:  # Psoriatic arthritis (diagnosed before ; less than 5% body surface area psoriasis: [id number]; RX Enbrel since ):   The patient relates absence of inflammatory joint symptoms. He notes stable, occasional focal psoriasis, responsive to topical therapy. Exam shows no psoriasis on head and neck. Laboratory evaluation on 2021 showed comprehensive metabolic panel, and CBC was normal in 2021.  CRP was normal.    Psoriatic arthritis remains completely suppressed on etanercept monotherapy. Reliable return of symptoms with even short delay in etanercept dosing suggests the presence of ongoing inflammatory disease, however.  I recommend continuing etanercept 50 mg subcutaneous once every 10 days. He may use ibuprofen 400 to 600 mg as needed for occasional joint pain. Continue PRN topical therapy for mild psoriasis.  Annual monitoring of creatinine and transaminases is suggested.    #COVID-19: Patient received 3 doses of COVID-19 vaccine, the most recent in 2021.  We discussed that he may be considered immunosuppressed given his treatment with etanercept, and that he would be eligible for a fourth dose or booster of  mRNA vaccine no less than 5 months after the third dose of vaccine.    #Health maintenance: Patient continues follow-up with cardiology for atrial fibrillation, and with primary care for hypertension and hyperlipidemia.    Return to clinic in one year.     No orders of the defined types were placed in this encounter.    Steve Pandey MD  Staff Rheumatologist, [facility]      Follow-up: No follow-ups on file.     HPI:   Prince Chang has a history of psoriatic arthritis, psoriasis, and atrial fibrillation status post ablation who presents for follow up.  I last saw the patient in February 2020, when psoriatic arthritis was judged quiescent.  I recommended continuing etanercept monotherapy, and using ibuprofen as needed occasional joint pain.    Interval history January 28, 2022    Health is good. He still notes onset of chancre sores if he delays the enbrel by a few days. He has noted this once in the past 6 months. But no joint stiffness or pain or swelling. No persistent psoriasis in his lower legs or behind his ears or knees. No use of NSAIDs.  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    Nothing as severe as at the onset of psoriatic arthriitis > 15 years ago.    He has continued enbrel 50 mg q 10 days.   He is active, playing tennis every few weeks, working with a sit-stand desk, but less aerobic activity than previously.    He had 3 doses of COVID19 vax, last one in early Fall.    Interval history 02-:    Health is good. He started lipitor for increased cholesterol per Dr. Ndiaye.  He is active, playing tennis once a week, working with a sit-stand desk, but less aerobic activity than previously. 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 Nothing as severe as at the onset of psoriatic arthriitis.  He has continued enbrel 50 mg q 10 days.   He still notes onset of chancre sores if he delays the enbrel by a few days. But no joint stiffness or pain  or swelling. No persistent psoriasis in his lower legs or behind his ears or knees. No use of NSAIDs.    Taking losartan and statin. Will get an Echo on 2-2022 to followup A-fib history. He had no episodes of fib on a 2 week monitoring, but he may need anticoagulation anyway.    History 2-2020:     Background:  Very occasional psoriasis back in the 1970s, but has very rare skin involvement in general with the exception of a patch that he applied hydrocortisone to behind his ear and behind his knee. The patient states that his joint symptoms started in 1999 with plantar fasciitis that progressed to hand, feet, and lateral hip pain. He states he started with Naproxen, moved on to Methotrexate topical, then oral methotrexate, and finally Enbrel. He states the Methotrexate did not really help his symptoms. However, he reports that the Enbrel has nearly resolved his symptoms and also seems to improve his canker sores on the buccal mucosa stating this is his warning sign to take Enbrel. He states he takes Enbrel every 7 to 10 days, and if he stretches beyond 10 days between Enbrel he notes returning joint pain. The patient denies red or dry eyes. He also denies chest discomfort though he does report mild congestion at times. He denies any injection site reactions. He denies limitations in physical activity. He notes nail changes in his toes likely due to fungus. The patient reports that he takes Naproxen as needed for unrelated pain, but does not need it for his joints.     HISTORY CARRIED FORWARD:  To review, he was diagnosed with psoriatic arthritis 14+ years ago, and it has been well controlled with Enbrel  He manages to stretch his injections to once every 8-10 days, and he notes that the development of canker sores are reproducibly an indication to take his medication. He has not noticed any inflammation or irritation at the injection site. He is not experiencing any active inflammation or pain. The patient states  "that his range of motion is \"almost back to normal,\" with his continued use of Enbrel. He has some intermittent shoulder pain on R that is rotator cuff related, exacerbated by tennis.  Additionally, the patient has a past medical history of atrial fibrillation since 1988, that required recent cardioversion in February; he had a second episode days later that resolved with Flecanide. He was given flecainide to use if his symptoms return, but has not needed to use the medication to date. The patient currently takes metoprolol XL and aspirin for his atrial fibrillation. He also occasionally experiences reflux, and this has been managed with omeprazole once a day.   Overall, the patient has been quite pleased with his medication, and plans to continue using Enbrel to manage his psoriatic arthritis. He has had only a small patch of psoriasis on L shin now resolved almost with Triamcinolone.  He had a bout of herpes zoster in February 2013 that resolved. He had Valtrex and has no post herpetic pain. We discussed in the past the lack of consensus about risk vs. Benefit of Zostavax in patients on anti TNF therapy. He had no questions or concerns.   He saw Nina Smith late 2014 and then Dr. Pandey emergently in late December for question of possible GCA. CRP and ESR were normal. He did have a TA bx by Dr. Ramon Lagunas which was negative. His symptoms  gradually abated and were absent a year ago.  He had a hernia repair in 2015 with pre op physical by Dr. Ndiaye, and interval followup by Dr. Hawthorne regarding prior stable pulmonary nodules and does not require followup.  Unfortunately he had a post op hematoma after surgery that opened and had to heal by secondary intention.  He was off Enbrel for about 8 weeks due to this. He did note right at the end of that slight return of hand stiffness and pain and slightly more psoriasis. This came back under excellent control after resuming and he is now close to asymptomatic " taking the Enbrel on average every 10-14 days.     Review of Systems:   Pertinent items are noted in HPI or as below, remainder of complete ROS is negative.      No recent problems with hearing or vision. No swallowing problems.   No breathing difficulty, shortness of breath, coughing, or wheezing  No chest pain or palpitations  No heart burn, indigestion, abdominal pain, nausea, vomiting  No urination problems, no bloody, cloudy urine, no dysuria  No numbing, tingling, weakness  No headaches or confusion  No rashes. No easy bleeding or bruising.     +canker sores    Active Medications:   Current Outpatient Medications   Medication Sig     atorvastatin (LIPITOR) 20 MG tablet Take 20 mg by mouth daily     etanercept (ENBREL) 50 MG/ML injection Inject 0.98 mLs (50 mg) Subcutaneous every 10 days Inject 0.98 mLs / 50mg every 10 days. Prefilled syringeHold for signs of infection, and then seek medical attention.     losartan (COZAAR) 50 MG tablet Take 0.5 tablets (25 mg) by mouth daily     atorvastatin (LIPITOR) 20 MG tablet Take 1 tablet (20 mg) by mouth daily (Patient not taking: Reported on 8/10/2021)     psyllium (METAMUCIL/KONSYL) capsule      No current facility-administered medications for this visit.           Allergies:   Other [no clinical screening - see comments]      Past Medical History:  Atrial fibrillation   Herpes Zoster  Almendarez's neuroma of the right foot  Psoriasis   Psoriatic arthritis   Pulmonary nodules   Esophageal reflux  Enlargement of prostate benign      Past Surgical History:  Cardioversion 10/6/2011, 2/10/2012, 2/8/2013, 11/6/2013  Cataract IOL, right /left   Tonsillectomy  Colonoscopy  Herniorrhaphy inguinal right 7/15/2015  Left atrial wide area circumferential radiofrequency ablation 2/13/2014  Phacoemulsification clear cornea with standard intraocular lens implant 6/1/2015    Family History:   Maternal grandfather: coronary artery disease , hypertension   Mother: hypertension, alzheimer  disease  Father: prostate cancer (age 60)      Social History:   Smoking Status: former smoker for 40 years   Smokeless Tobacco: Never Used  Alcohol Use: Yes   Occasional, 10 drink equivalents per week   Drug Use: No  PCP: Brendan Ndiaye   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      Physical Exam:   There were no vitals taken for this visit.   Wt Readings from Last 4 Encounters:   07/01/21 73.9 kg (163 lb)   02/07/20 74.4 kg (164 lb 1.6 oz)   01/02/19 75.2 kg (165 lb 12.8 oz)   08/15/18 72.8 kg (160 lb 8 oz)     Constitutional: Well-developed, appearing stated age; cooperative  Eyes: Normal EOM, PERRLA, vision, conjunctiva, sclera  ENT: Normal external ears, nose, hearing, lips, teeth, gums, throat. No mucous membrane lesions, normal saliva pool  Neck: No mass or thyroid enlargement  Resp: breathing unlabored  MS: No swelling MCP and PIP. Some angulation in bilateral 5th DIP in both hands. Dorsal medial aspect of left 1st DIP is slightly prominent, firm swelling that appears focal. Excellent  strength. Excellent wrist range of motion bilaterally. The TMJ, neck, shoulder, elbow, wrist were normal   Skin: No nail pitting, alopecia, rash, nodules or lesions  Neuro: Normal cranial nerves  Psych: Normal judgement, orientation, memory, affect.     Laboratory:   RHEUM RESULTS Latest Ref Rng & Units 2/22/2007 10/6/2011 2/10/2012   ALBUMIN 3.4 - 5.0 g/dL - 4.5 -   ALT 0 - 70 U/L - 32 -   AST 0 - 45 U/L - 28 -   CREATININE 0.66 - 1.25 mg/dL 1.06 0.95 0.84   CRP 0.0 - 8.0 mg/L - - -   GFR ESTIMATE, IF BLACK >60 mL/min/[1.73:m2] >90 >90 >90   GFR ESTIMATE >60 mL/min/[1.73:m2] 78 82 >90   HEMATOCRIT 40.0 - 53.0 % 38.7(L) 42.5 41.9   HEMOGLOBIN 13.3 - 17.7 g/dL 13.3 14.3 14.3   HCVAB NEG - - -   WBC 4.0 - 11.0 10e9/L 5.7 7.0 6.6   RBC 4.4 - 5.9 10e12/L 4.38(L) 4.81 4.74   RDW 10.0 - 15.0 % 12.6 12.8 12.6   MCHC 31.5 - 36.5 g/dL 34.3 33.6 34.1   MCV 78 - 100 fl 88 88 88   PLATELET COUNT 150 - 450 10e9/L 163 175  166   ESR 0 - 20 mm/h - - -

## 2022-01-28 ENCOUNTER — VIRTUAL VISIT (OUTPATIENT)
Dept: RHEUMATOLOGY | Facility: CLINIC | Age: 67
End: 2022-01-28
Attending: INTERNAL MEDICINE
Payer: COMMERCIAL

## 2022-01-28 DIAGNOSIS — L40.50 PSORIATIC ARTHRITIS (H): ICD-10-CM

## 2022-01-28 PROCEDURE — 99214 OFFICE O/P EST MOD 30 MIN: CPT | Mod: 95 | Performed by: INTERNAL MEDICINE

## 2022-01-28 RX ORDER — ETANERCEPT 50 MG/ML
50 SOLUTION SUBCUTANEOUS
Qty: 3 ML | Refills: 12 | Status: SHIPPED | OUTPATIENT
Start: 2022-01-28 | End: 2022-04-25

## 2022-01-28 NOTE — PATIENT INSTRUCTIONS
Diagnosis:  1.  Psoriatic arthritis: Both skin and joint inflammation are well controlled with reduced frequency etanercept.  I recommend no change in etanercept.    Plan:  1.  Continue etanercept 50 mg subcutaneously every 10 days.  2.  Check liver function, kidney function, and complete blood count ~ once yearly.

## 2022-01-28 NOTE — LETTER
2022       RE: Prince Pereira  [address]  [facility] [address]     Dear Colleague,    Thank you for referring your patient, Prince Pereira, to the [facility] RHEUMATOLOGY CLINIC [place] at [facility]. Please see a copy of my visit note below.    [facility]  Rheumatology Clinic  Steve Pandey MD  2022     Name: Prince Pereira  MRN: [id number]  Age: 66 year old  : 1955  Referring provider: Brendan Ndiaye     Assessment and Plan:  # Psoriatic arthritis (diagnosed before ; less than 5% body surface area psoriasis: [id number]; RX Enbrel since ):   The patient relates absence of inflammatory joint symptoms. He notes stable, occasional focal psoriasis, responsive to topical therapy. Exam shows no psoriasis on head and neck. Laboratory evaluation on 2021 showed comprehensive metabolic panel, and CBC was normal in 2021.  CRP was normal.    Psoriatic arthritis remains completely suppressed on etanercept monotherapy. Reliable return of symptoms with even short delay in etanercept dosing suggests the presence of ongoing inflammatory disease, however.  I recommend continuing etanercept 50 mg subcutaneous once every 10 days. He may use ibuprofen 400 to 600 mg as needed for occasional joint pain. Continue PRN topical therapy for mild psoriasis.  Annual monitoring of creatinine and transaminases is suggested.    #COVID-19: Patient received 3 doses of COVID-19 vaccine, the most recent in 2021.  We discussed that he may be considered immunosuppressed given his treatment with etanercept, and that he would be eligible for a fourth dose or booster of mRNA vaccine no less than 5 months after the third dose of vaccine.    #Health maintenance: Patient continues follow-up with cardiology for atrial fibrillation, and with primary care for hypertension and  hyperlipidemia.    Return to clinic in one year.     No orders of the defined types were placed in this encounter.    Steve Pandey MD  Staff Rheumatologist, [facility]      Follow-up: No follow-ups on file.     HPI:   Prince Chang has a history of psoriatic arthritis, psoriasis, and atrial fibrillation status post ablation who presents for follow up.  I last saw the patient in February 2020, when psoriatic arthritis was judged quiescent.  I recommended continuing etanercept monotherapy, and using ibuprofen as needed occasional joint pain.    Interval history January 28, 2022    Health is good. He still notes onset of chancre sores if he delays the enbrel by a few days. He has noted this once in the past 6 months. But no joint stiffness or pain or swelling. No persistent psoriasis in his lower legs or behind his ears or knees. No use of NSAIDs.  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    Nothing as severe as at the onset of psoriatic arthriitis > 15 years ago.    He has continued enbrel 50 mg q 10 days.   He is active, playing tennis every few weeks, working with a sit-stand desk, but less aerobic activity than previously.    He had 3 doses of COVID19 vax, last one in early Fall.    Interval history 02-:    Health is good. He started lipitor for increased cholesterol per Dr. Ndiaye.  He is active, playing tennis once a week, working with a sit-stand desk, but less aerobic activity than previously. 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 Nothing as severe as at the onset of psoriatic arthriitis.  He has continued enbrel 50 mg q 10 days.   He still notes onset of chancre sores if he delays the enbrel by a few days. But no joint stiffness or pain or swelling. No persistent psoriasis in his lower legs or behind his ears or knees. No use of NSAIDs.    Taking losartan and statin. Will get an Echo on 2-2022 to followup A-fib history. He had no  "episodes of fib on a 2 week monitoring, but he may need anticoagulation anyway.    History 2-2020:     Background:  Very occasional psoriasis back in the 1970s, but has very rare skin involvement in general with the exception of a patch that he applied hydrocortisone to behind his ear and behind his knee. The patient states that his joint symptoms started in 1999 with plantar fasciitis that progressed to hand, feet, and lateral hip pain. He states he started with Naproxen, moved on to Methotrexate topical, then oral methotrexate, and finally Enbrel. He states the Methotrexate did not really help his symptoms. However, he reports that the Enbrel has nearly resolved his symptoms and also seems to improve his canker sores on the buccal mucosa stating this is his warning sign to take Enbrel. He states he takes Enbrel every 7 to 10 days, and if he stretches beyond 10 days between Enbrel he notes returning joint pain. The patient denies red or dry eyes. He also denies chest discomfort though he does report mild congestion at times. He denies any injection site reactions. He denies limitations in physical activity. He notes nail changes in his toes likely due to fungus. The patient reports that he takes Naproxen as needed for unrelated pain, but does not need it for his joints.     HISTORY CARRIED FORWARD:  To review, he was diagnosed with psoriatic arthritis 14+ years ago, and it has been well controlled with Enbrel  He manages to stretch his injections to once every 8-10 days, and he notes that the development of canker sores are reproducibly an indication to take his medication. He has not noticed any inflammation or irritation at the injection site. He is not experiencing any active inflammation or pain. The patient states that his range of motion is \"almost back to normal,\" with his continued use of Enbrel. He has some intermittent shoulder pain on R that is rotator cuff related, exacerbated by tennis.  Additionally, " the patient has a past medical history of atrial fibrillation since 1988, that required recent cardioversion in February; he had a second episode days later that resolved with Flecanide. He was given flecainide to use if his symptoms return, but has not needed to use the medication to date. The patient currently takes metoprolol XL and aspirin for his atrial fibrillation. He also occasionally experiences reflux, and this has been managed with omeprazole once a day.   Overall, the patient has been quite pleased with his medication, and plans to continue using Enbrel to manage his psoriatic arthritis. He has had only a small patch of psoriasis on L shin now resolved almost with Triamcinolone.  He had a bout of herpes zoster in February 2013 that resolved. He had Valtrex and has no post herpetic pain. We discussed in the past the lack of consensus about risk vs. Benefit of Zostavax in patients on anti TNF therapy. He had no questions or concerns.   He saw Nina Smith late 2014 and then Dr. Pandey emergently in late December for question of possible GCA. CRP and ESR were normal. He did have a TA bx by Dr. Ramon Lagunas which was negative. His symptoms  gradually abated and were absent a year ago.  He had a hernia repair in 2015 with pre op physical by Dr. Ndiaye, and interval followup by Dr. Hawthorne regarding prior stable pulmonary nodules and does not require followup.  Unfortunately he had a post op hematoma after surgery that opened and had to heal by secondary intention.  He was off Enbrel for about 8 weeks due to this. He did note right at the end of that slight return of hand stiffness and pain and slightly more psoriasis. This came back under excellent control after resuming and he is now close to asymptomatic taking the Enbrel on average every 10-14 days.     Review of Systems:   Pertinent items are noted in HPI or as below, remainder of complete ROS is negative.      No recent problems with hearing or  vision. No swallowing problems.   No breathing difficulty, shortness of breath, coughing, or wheezing  No chest pain or palpitations  No heart burn, indigestion, abdominal pain, nausea, vomiting  No urination problems, no bloody, cloudy urine, no dysuria  No numbing, tingling, weakness  No headaches or confusion  No rashes. No easy bleeding or bruising.     +canker sores    Active Medications:   Current Outpatient Medications   Medication Sig     atorvastatin (LIPITOR) 20 MG tablet Take 20 mg by mouth daily     etanercept (ENBREL) 50 MG/ML injection Inject 0.98 mLs (50 mg) Subcutaneous every 10 days Inject 0.98 mLs / 50mg every 10 days. Prefilled syringeHold for signs of infection, and then seek medical attention.     losartan (COZAAR) 50 MG tablet Take 0.5 tablets (25 mg) by mouth daily     atorvastatin (LIPITOR) 20 MG tablet Take 1 tablet (20 mg) by mouth daily (Patient not taking: Reported on 8/10/2021)     psyllium (METAMUCIL/KONSYL) capsule      No current facility-administered medications for this visit.           Allergies:   Other [no clinical screening - see comments]      Past Medical History:  Atrial fibrillation   Herpes Zoster  Almendarez's neuroma of the right foot  Psoriasis   Psoriatic arthritis   Pulmonary nodules   Esophageal reflux  Enlargement of prostate benign      Past Surgical History:  Cardioversion 10/6/2011, 2/10/2012, 2/8/2013, 11/6/2013  Cataract IOL, right /left   Tonsillectomy  Colonoscopy  Herniorrhaphy inguinal right 7/15/2015  Left atrial wide area circumferential radiofrequency ablation 2/13/2014  Phacoemulsification clear cornea with standard intraocular lens implant 6/1/2015    Family History:   Maternal grandfather: coronary artery disease , hypertension   Mother: hypertension, alzheimer disease  Father: prostate cancer (age 60)      Social History:   Smoking Status: former smoker for 40 years   Smokeless Tobacco: Never Used  Alcohol Use: Yes   Occasional, 10 drink equivalents per  week   Drug Use: No  PCP: Brendan Ndiaye   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      Physical Exam:   There were no vitals taken for this visit.   Wt Readings from Last 4 Encounters:   07/01/21 73.9 kg (163 lb)   02/07/20 74.4 kg (164 lb 1.6 oz)   01/02/19 75.2 kg (165 lb 12.8 oz)   08/15/18 72.8 kg (160 lb 8 oz)     Constitutional: Well-developed, appearing stated age; cooperative  Eyes: Normal EOM, PERRLA, vision, conjunctiva, sclera  ENT: Normal external ears, nose, hearing, lips, teeth, gums, throat. No mucous membrane lesions, normal saliva pool  Neck: No mass or thyroid enlargement  Resp: breathing unlabored  MS: No swelling MCP and PIP. Some angulation in bilateral 5th DIP in both hands. Dorsal medial aspect of left 1st DIP is slightly prominent, firm swelling that appears focal. Excellent  strength. Excellent wrist range of motion bilaterally. The TMJ, neck, shoulder, elbow, wrist were normal   Skin: No nail pitting, alopecia, rash, nodules or lesions  Neuro: Normal cranial nerves  Psych: Normal judgement, orientation, memory, affect.     Laboratory:   RHEUM RESULTS Latest Ref Rng & Units 2/22/2007 10/6/2011 2/10/2012   ALBUMIN 3.4 - 5.0 g/dL - 4.5 -   ALT 0 - 70 U/L - 32 -   AST 0 - 45 U/L - 28 -   CREATININE 0.66 - 1.25 mg/dL 1.06 0.95 0.84   CRP 0.0 - 8.0 mg/L - - -   GFR ESTIMATE, IF BLACK >60 mL/min/[1.73:m2] >90 >90 >90   GFR ESTIMATE >60 mL/min/[1.73:m2] 78 82 >90   HEMATOCRIT 40.0 - 53.0 % 38.7(L) 42.5 41.9   HEMOGLOBIN 13.3 - 17.7 g/dL 13.3 14.3 14.3   HCVAB NEG - - -   WBC 4.0 - 11.0 10e9/L 5.7 7.0 6.6   RBC 4.4 - 5.9 10e12/L 4.38(L) 4.81 4.74   RDW 10.0 - 15.0 % 12.6 12.8 12.6   MCHC 31.5 - 36.5 g/dL 34.3 33.6 34.1   MCV 78 - 100 fl 88 88 88   PLATELET COUNT 150 - 450 10e9/L 163 175 166   ESR 0 - 20 mm/h - - -       Again, thank you for allowing me to participate in the care of your patient.      Sincerely,    Steve Pandey MD

## 2022-02-09 ENCOUNTER — OFFICE VISIT (OUTPATIENT)
Dept: INTERNAL MEDICINE | Facility: CLINIC | Age: 67
End: 2022-02-09
Payer: COMMERCIAL

## 2022-02-09 VITALS
OXYGEN SATURATION: 98 % | WEIGHT: 163.6 LBS | TEMPERATURE: 98 F | HEIGHT: 71 IN | HEART RATE: 66 BPM | BODY MASS INDEX: 22.9 KG/M2 | RESPIRATION RATE: 16 BRPM | SYSTOLIC BLOOD PRESSURE: 139 MMHG | DIASTOLIC BLOOD PRESSURE: 88 MMHG

## 2022-02-09 DIAGNOSIS — E78.5 HYPERLIPIDEMIA LDL GOAL <70: ICD-10-CM

## 2022-02-09 DIAGNOSIS — K64.4 EXTERNAL HEMORRHOIDS: ICD-10-CM

## 2022-02-09 DIAGNOSIS — Z80.42 FAMILY HISTORY OF PROSTATE CANCER: Primary | ICD-10-CM

## 2022-02-09 DIAGNOSIS — E78.00 HYPERCHOLESTEREMIA: ICD-10-CM

## 2022-02-09 DIAGNOSIS — I10 HYPERTENSION, UNSPECIFIED TYPE: ICD-10-CM

## 2022-02-09 PROCEDURE — 99214 OFFICE O/P EST MOD 30 MIN: CPT | Performed by: INTERNAL MEDICINE

## 2022-02-09 RX ORDER — ATORVASTATIN CALCIUM 20 MG/1
20 TABLET, FILM COATED ORAL DAILY
Qty: 100 TABLET | Refills: 3 | Status: SHIPPED | OUTPATIENT
Start: 2022-02-09 | End: 2023-03-16

## 2022-02-09 RX ORDER — HYDROCORTISONE ACETATE SUPPOSITORY 30 MG/1
1 SUPPOSITORY RECTAL DAILY
Qty: 28 SUPPOSITORY | Refills: 1 | Status: SHIPPED | OUTPATIENT
Start: 2022-02-09 | End: 2022-04-22

## 2022-02-09 RX ORDER — LOSARTAN POTASSIUM 50 MG/1
50 TABLET ORAL DAILY
Qty: 100 TABLET | Refills: 3 | Status: SHIPPED | OUTPATIENT
Start: 2022-02-09 | End: 2022-03-28

## 2022-02-09 RX ORDER — LOSARTAN POTASSIUM 50 MG/1
50 TABLET ORAL DAILY
COMMUNITY
End: 2022-02-09

## 2022-02-09 RX ORDER — BENZOCAINE/MENTHOL 6 MG-10 MG
LOZENGE MUCOUS MEMBRANE 2 TIMES DAILY
Qty: 120 G | Refills: 4 | Status: SHIPPED | OUTPATIENT
Start: 2022-02-09

## 2022-02-09 ASSESSMENT — MIFFLIN-ST. JEOR: SCORE: 1531.27

## 2022-02-09 ASSESSMENT — PAIN SCALES - GENERAL: PAINLEVEL: NO PAIN (0)

## 2022-02-09 NOTE — NURSING NOTE
Chief Complaint   Patient presents with     Recheck Medication     Patient comes in for a follow up.         Rich Velazquez MA on 2/9/2022 at 5:44 PM

## 2022-02-10 DIAGNOSIS — K64.4 EXTERNAL HEMORRHOIDS: Primary | ICD-10-CM

## 2022-02-10 NOTE — PROGRESS NOTES
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  He is history is significant for paroxysmal atrial fibrillation status post ablation.  He recently had a Zio patch study done which showed no evidence of recurrent atrial fibrillation there is some question now that his chads vas 2 score is 2 and whether he should be anticoagulated.  He is scheduled for an echocardiogram to assess his left atrium in this regard.  He is hypertensive his losartan was recently increased to 50 mg daily.  He is monitoring his blood pressure at home and it sounds as if it is much better typically running in the 130s and 70s or less.  He has had no problems on the present medication other than question if it is interfering with erections as he has had more erectile difficulty recently.  He has been treated with atorvastatin with significant improvement in the lipids and he has had no issues or concerns with this.  His biggest problem today is hemorrhoids.  He is a longstanding history of hemorrhoids.  He does not strain at stools he has soft stools he has an unusual bowel pattern we will go to 3 days with no bowel movements then past 4-5 soft stools then another 2-3 stools the following day.  These are soft bloating as his diet is high in fruits vegetables and fiber.  He has not used any steroid cream on a regular basis for this and we discussed this.  He has had occasional bleeding and significant pain and discomfort at times after his bowel movements.  Past Medical History:   Diagnosis Date     Atrial fibrillation (H)     paroxysmal with ablation 2014     Family history of prostate cancer     father  age 60     Herpes zoster 3/26/2013     Almendarez's neuroma of right foot      Psoriasis      psoriatic arthritis      Pulmonary nodules 2014    multiple noncalcified up to 6 mm needs f/u     Past Surgical History:   Procedure Laterality Date     ANESTHESIA CARDIOVERSION  10/6/2011    Procedure:ANESTHESIA CARDIOVERSION;  "CARDIOVERSION; Surgeon:GENERIC ANESTHESIA PROVIDER; Location:[facility]     ANESTHESIA CARDIOVERSION  2/10/2012    Procedure:ANESTHESIA CARDIOVERSION; CARDIOVERSION; Surgeon:GENERIC ANESTHESIA PROVIDER; Location:[facility]     ANESTHESIA CARDIOVERSION  2013    Procedure: ANESTHESIA CARDIOVERSION;  Cardioversion;  Surgeon: Provider, Generic Anesthesia;  Location: [facility]     ANESTHESIA CARDIOVERSION  2013    Procedure: ANESTHESIA CARDIOVERSION;  Cardioversion;  Surgeon: Provider, Generic Anesthesia;  Location: [facility] OR     CATARACT IOL, RT/LT Left 2015     COLONOSCOPY       ENT SURGERY      tonsillectomy     HERNIORRHAPHY INGUINAL Right 7/15/2015    Procedure: HERNIORRHAPHY INGUINAL;  Surgeon: Bk Watts MD;  Location: [facility] OR     Left Atrial Wide Area Circumferential radiofrequency ablation  2014      for atrial fib     PHACOEMULSIFICATION CLEAR CORNEA WITH STANDARD INTRAOCULAR LENS IMPLANT Right 2015    Procedure: PHACOEMULSIFICATION CLEAR CORNEA WITH STANDARD INTRAOCULAR LENS IMPLANT;  Surgeon: Petr Moyer MD;  Location: [facility]     Family History   Problem Relation Age of Onset     C.A.D. Maternal Grandfather          age 63     Hypertension Maternal Grandfather      Hypertension Mother      Alzheimer Disease Mother      Prostate Cancer Father          age 60 agressive prostate ca     Alzheimer Disease Maternal Grandmother      Glaucoma No family hx of      Macular Degeneration No family hx of          Exam:  /88   Pulse 66   Temp 98  F (36.7  C) (Oral)   Resp 16   Ht 1.791 m (5' 10.5\")   Wt 74.2 kg (163 lb 9.6 oz)   SpO2 98%   BMI 23.14 kg/m    163 lbs 9.6 oz  Physical Exam   The patient is alert, oriented with a clear sensorium.   Skin shows no lesions or rashes and good turgor.   Head is normocephalic and atraumatic.   Neck shows no nodes, thyromegaly or bruits.   Back is non tender.  Lungs are clear to percussion and auscultation.   Heart shows normal S1 " and S2 without murmur or gallop.   Abdomen is soft and nontender without masses or organomegaly.   Rectal shows multiple large external hemorrhoids with some irritation and slight bleeding.  Large smooth prostate without nodules or masses.  Extremities show no edema and no evidence of active synovitis.     ASSESSMENT  Hemorrhoids  Hypertension  Psoriatic arthritis  Family history prostate cancer  History AF S/P ablation  BPH    Plan  I renewed his losartan and his atorvastatin.  We will start him on Proctocort cream once or twice a day and cortisone enemas once a day for the next couple of weeks and refer to colorectal surgery regarding the hemorrhoids.  We discussed nonpharmacologic blood pressure control measures.  I question his need for anticoagulation given his absence of atrial fibrillation now for several years but will wait to see what his left atrium looks like  Over 30 minutes spent on the day of service in chart review, patient contact, record completion and review and notification of lab reports    This note was completed using Dragon voice recognition software.      Brendan Ndiaye MD  General Internal Medicine  [facility]  [phone]

## 2022-02-10 NOTE — TELEPHONE ENCOUNTER
[facility] Call Center    Phone Message    May a detailed message be left on voicemail: yes     Reason for Call: Medication Question or concern regarding medication   Prescription Clarification  Name of Medication: hydrocortisone acetate (PROCTOCORT) 30 MG SUPP   Prescribing Provider: Brendan Ndiaye MD   [facility] [address]   What on the order needs clarification? Silvana from Pharmacy reporting hydrocortisone acetate (PROCTOCORT) 30 MG SUPP is not covered by insurance. Hoping to change to hydrocortisone acetate (PROCTOCORT) 25 MG SUPP since it is covered by insurance.      #: [phone] option 5     Action Taken: Message routed to:  [facility]    Travel Screening: Not Applicable

## 2022-02-11 RX ORDER — HYDROCORTISONE ACETATE 25 MG/1
25 SUPPOSITORY RECTAL DAILY
Qty: 24 SUPPOSITORY | Refills: 1 | Status: SHIPPED | OUTPATIENT
Start: 2022-02-11 | End: 2022-04-22

## 2022-02-14 ENCOUNTER — LAB (OUTPATIENT)
Dept: LAB | Facility: CLINIC | Age: 67
End: 2022-02-14
Payer: COMMERCIAL

## 2022-02-14 ENCOUNTER — ANCILLARY PROCEDURE (OUTPATIENT)
Dept: CARDIOLOGY | Facility: CLINIC | Age: 67
End: 2022-02-14
Attending: INTERNAL MEDICINE
Payer: COMMERCIAL

## 2022-02-14 DIAGNOSIS — I48.0 PAROXYSMAL ATRIAL FIBRILLATION (H): ICD-10-CM

## 2022-02-14 DIAGNOSIS — I10 BENIGN ESSENTIAL HYPERTENSION: ICD-10-CM

## 2022-02-14 DIAGNOSIS — L40.50 PSORIATIC ARTHRITIS (H): ICD-10-CM

## 2022-02-14 DIAGNOSIS — Z80.42 FAMILY HISTORY OF PROSTATE CANCER: ICD-10-CM

## 2022-02-14 LAB
ANION GAP SERPL CALCULATED.3IONS-SCNC: 7 MMOL/L (ref 3–14)
BUN SERPL-MCNC: 11 MG/DL (ref 7–30)
CALCIUM SERPL-MCNC: 9 MG/DL (ref 8.5–10.1)
CHLORIDE BLD-SCNC: 106 MMOL/L (ref 94–109)
CO2 SERPL-SCNC: 27 MMOL/L (ref 20–32)
CREAT SERPL-MCNC: 0.88 MG/DL (ref 0.66–1.25)
ERYTHROCYTE [DISTWIDTH] IN BLOOD BY AUTOMATED COUNT: 12.2 % (ref 10–15)
GFR SERPL CREATININE-BSD FRML MDRD: >90 ML/MIN/1.73M2
GLUCOSE BLD-MCNC: 109 MG/DL (ref 70–99)
HCT VFR BLD AUTO: 40.5 % (ref 40–53)
HGB BLD-MCNC: 13.4 G/DL (ref 13.3–17.7)
LVEF ECHO: NORMAL
MCH RBC QN AUTO: 29.5 PG (ref 26.5–33)
MCHC RBC AUTO-ENTMCNC: 33.1 G/DL (ref 31.5–36.5)
MCV RBC AUTO: 89 FL (ref 78–100)
PLAT MORPH BLD: NORMAL
PLATELET # BLD AUTO: 165 10E3/UL (ref 150–450)
POTASSIUM BLD-SCNC: 3.9 MMOL/L (ref 3.4–5.3)
PSA SERPL-MCNC: 2.94 UG/L (ref 0–4)
RBC # BLD AUTO: 4.55 10E6/UL (ref 4.4–5.9)
RBC MORPH BLD: NORMAL
SODIUM SERPL-SCNC: 140 MMOL/L (ref 133–144)
WBC # BLD AUTO: 6.9 10E3/UL (ref 4–11)

## 2022-02-14 PROCEDURE — 80048 BASIC METABOLIC PNL TOTAL CA: CPT | Performed by: PATHOLOGY

## 2022-02-14 PROCEDURE — 93306 TTE W/DOPPLER COMPLETE: CPT | Performed by: STUDENT IN AN ORGANIZED HEALTH CARE EDUCATION/TRAINING PROGRAM

## 2022-02-14 PROCEDURE — 36415 COLL VENOUS BLD VENIPUNCTURE: CPT | Performed by: PATHOLOGY

## 2022-02-14 PROCEDURE — 85027 COMPLETE CBC AUTOMATED: CPT | Performed by: PATHOLOGY

## 2022-02-14 PROCEDURE — G0103 PSA SCREENING: HCPCS | Performed by: PATHOLOGY

## 2022-02-16 NOTE — TELEPHONE ENCOUNTER
Diagnosis, Referred by & from: External Hemorrhoids   Appt date: 5/25/2022   NOTES STATUS DETAILS   OFFICE NOTE from referring provider [facility]:  2/9/22 - [facility] OV with Dr. Ndiaye   OFFICE NOTE from other specialist [facility]:  7/31/18 - [facility] OV with Dr. Bangura   DISCHARGE SUMMARY from hospital N/A    DISCHARGE REPORT from the ER N/A    OPERATIVE REPORT N/A    MEDICATION LIST Internal    LABS     BIOPSIES/PATHOLOGY RELATED TO DIAGNOSIS [facility]:  9/5/18 - Colon Biopsy (Case: [id number])   DIAGNOSTIC PROCEDURES     COLONOSCOPY [facility]:  9/5/18 - Colonoscopy   IMAGING (DISC & REPORT)      ULTRASOUND  (ENDOANAL/ENDORECTAL) [facility]:  8/8/14 - US Abdomen

## 2022-03-24 NOTE — PROGRESS NOTES
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     His CAD risk factor profile is notable for: +HTN, +HLD, + former tobacco use (college), - DM, possible family history of CAD (materal grandfather  of cardiac event 63).     Patient follows with Dr. Vera Rodriguez for a-fib management. He underwent pulmonary vein isolation for AF on 2014 and warfarin/metoprolol were discontinued 2014. He's had two episodes of recurrent AF since : one in setting of severe back pain lasted 4 hours May 2017; the other one on 2020 when he was on vacation, carrying fishing gear, lasted 2-3 hours. He had no further episodes of AF until 2021 - he was at work when he felt an irregular rhythm consistent with his AF, his pulse was around 100 bpm. He did not feel palpitations as much as he did previously. Episode lasted about 3 hours and terminated spontaneously. Last evaluated by Dr. Vera Rodriguez 10/2021, anticoagulation was discussed, he wore a 14 day monitor which showed no recurrent a-fib; therefore, patient deferred anticoagulation. At his visit he was noted to be hypertensive, his metoprolol was discontinued and he was started on losartan. Recent echo 2022 shows normal LV function.     For about the past month he has been experiencing episodes of dull, sometimes squeezing left sided chest pain. The episodes occur multiple times a day, waxing and wane over the course of 30 minutes to an hour. His chest pain is not related to exertion, sometimes notices it in the middle of the night. He is still able to exert himself without limitation, but has been doing less out of concern.  He denies radiation of the pain, no associated nausea, diaphoresis. Nothing makes the pain better or worse. He thought it could be related to reflux, took some omeprazole with no relief. Also took naproxen thinking it could be musculoskeletal with no  improvement. Does not feel like his a-fib episodes with he reports as palpitations and decreased stamina. He otherwise denies shortness of breath, orthopnea, PND, leg swelling, palpitations, lightheadedness, syncope.     Home blood pressures generally 130s-140s/80s.     PAST MEDICAL HISTORY:  1. Atrial fibrillation, first episode , s/p PVI 2014, prolonged hospital stay due to femoral hematoma. Multiple cardioversions prior to PVI. Recurrent  and May 2020, each time 2-4 hours. Recurrent 2021, 3 hours.  2. Pulmonary nodules  3. Psoriatic arthritis  4. Herpes zoster  5. Hypertension  6. Hyperlipidemia     CURRENT MEDICATIONS:  Atorvastatin 20 every day  Losartan 50 mg   Enbrel injections    ALLERGIES:     Allergies   Allergen Reactions     Other [No Clinical Screening - See Comments] Rash     Cardioversion pads cause rash.       FAMILY HISTORY:  Family History   Problem Relation Age of Onset     C.A.D. Maternal Grandfather          age 63     Hypertension Maternal Grandfather      Hypertension Mother      Alzheimer Disease Mother      Prostate Cancer Father          age 60 agressive prostate ca     Alzheimer Disease Maternal Grandmother      Glaucoma No family hx of      Macular Degeneration No family hx of        SOCIAL HISTORY:  Social History     Tobacco Use     Smoking status: Former Smoker     Years: 1.00     Types: Cigarettes     Quit date: 1980     Years since quittin.2     Smokeless tobacco: Never Used   Substance Use Topics     Alcohol use: Yes     Alcohol/week: 8.3 standard drinks     Types: 10 Standard drinks or equivalent per week     Comment: occasional     Drug use: No       ROS:  10 point ROS neg other than the symptoms noted above in the HPI.    I have personally reviewed the following:     EKG: 3/28/22: sinus 72 bpm, normal intervals, no ischemia or infarction     ECHOCARDIOGRAM 2022:  Normal biventricular function without WMA, no valvular disease, mild aortic  "dilation 4.2 cm    PATCH MONITOR 8/31-9/12/2021:  Sinus average 71 bpm, range  bpm.  PVCs 2%, PACs <1%.  Patient triggered events = PACs and PVCs (accidentally - he did not intentionally trigger any specific events)  NSVT 6 beats 9/8 at 3:41 pm - no symptoms.  Runs of PACs longest 31 seconds at 122 bpm, no symptoms.  No AFib detected.     Exam:  BP (!) 148/84 (BP Location: Right arm, Patient Position: Sitting, Cuff Size: Adult Regular)   Pulse 75   Ht 1.774 m (5' 9.84\")   Wt 72.9 kg (160 lb 11.2 oz)   SpO2 99%   BMI 23.16 kg/m    CONSITUTIONAL: no acute distress  HEENT: no icterus, no redness or discharge, neck supple  CV: no visible edema of visualized extremities. No JVD.   RESPIRATORY: respirations nonlabored, no cough  NEURO: AA&Ox3, speech fluent/appropriate, motor grossly nonfocal  PSYCH: cooperative, affect appropriate  DERM: no rashes on visualized face/neck/upper extremities    Assessment and Plan:   This is a 66-year-old male with a past medical history of a-fib, hypertension, hyperlipidemia and former tobacco use who presents for evaluation of atypical chest pain. He describes dull nonexertional chest pain that waxes and wanes over the course of the day. His EKG today shows normal sinus rhythm without ischemic changes.  Recent echo shows normal biventricular function without wall motion abnormalities. Given CAD risk factors would recommend coronary CTA to rule out obstructive coronary disease.  His blood pressure is above goal, plan to increase losartan to 100 mg daily with repeat BMP in 1 week.  We will see patient back in clinic in 1 month for ongoing hypertension management and to review results of coronary CTA.    ARASH Mas, CNP  Structural Heart Nurse Practitioner  [facility]  Clinic: [phone]  Pager: [phone]           "

## 2022-03-28 ENCOUNTER — OFFICE VISIT (OUTPATIENT)
Dept: CARDIOLOGY | Facility: CLINIC | Age: 67
End: 2022-03-28
Attending: INTERNAL MEDICINE
Payer: COMMERCIAL

## 2022-03-28 VITALS
BODY MASS INDEX: 23.01 KG/M2 | DIASTOLIC BLOOD PRESSURE: 84 MMHG | HEIGHT: 70 IN | HEART RATE: 75 BPM | OXYGEN SATURATION: 99 % | SYSTOLIC BLOOD PRESSURE: 148 MMHG | WEIGHT: 160.7 LBS

## 2022-03-28 DIAGNOSIS — R07.9 CHEST PAIN, UNSPECIFIED TYPE: Primary | ICD-10-CM

## 2022-03-28 DIAGNOSIS — I10 HYPERTENSION, UNSPECIFIED TYPE: ICD-10-CM

## 2022-03-28 DIAGNOSIS — I10 BENIGN ESSENTIAL HYPERTENSION: ICD-10-CM

## 2022-03-28 LAB
ATRIAL RATE - MUSE: 72 BPM
DIASTOLIC BLOOD PRESSURE - MUSE: NORMAL MMHG
INTERPRETATION ECG - MUSE: NORMAL
P AXIS - MUSE: 59 DEGREES
PR INTERVAL - MUSE: 166 MS
QRS DURATION - MUSE: 96 MS
QT - MUSE: 382 MS
QTC - MUSE: 418 MS
R AXIS - MUSE: 6 DEGREES
SYSTOLIC BLOOD PRESSURE - MUSE: NORMAL MMHG
T AXIS - MUSE: 16 DEGREES
VENTRICULAR RATE- MUSE: 72 BPM

## 2022-03-28 PROCEDURE — 93005 ELECTROCARDIOGRAM TRACING: CPT

## 2022-03-28 PROCEDURE — G0463 HOSPITAL OUTPT CLINIC VISIT: HCPCS | Mod: 25

## 2022-03-28 PROCEDURE — 99214 OFFICE O/P EST MOD 30 MIN: CPT | Performed by: NURSE PRACTITIONER

## 2022-03-28 RX ORDER — LOSARTAN POTASSIUM 100 MG/1
100 TABLET ORAL DAILY
Qty: 90 TABLET | Refills: 1 | Status: SHIPPED | OUTPATIENT
Start: 2022-03-28 | End: 2022-11-01

## 2022-03-28 ASSESSMENT — PAIN SCALES - GENERAL: PAINLEVEL: NO PAIN (0)

## 2022-03-28 NOTE — PATIENT INSTRUCTIONS
You were seen today in the Cardiovascular Clinic at the [facility].    Cardiology provider you saw during your visit Lizzette Flores NP.    1. Schedule coronary CTA within the next 1-2 weeks with labs prior.  2. Increase losartan to 100 mg daily - repeat BMP in 1 week.  3. If you have any prolonged episodes of severe chest pain at rest please call 911 or go to the ER.    4. Please make a follow-up appointment in 1 month to review blood pressures and CT results.     Questions and [phone].   First press #1 for the [employer] and then press #3 for Medical Questions to reach the Cardiology triage nurse.     On Call Cardiologist for after hours or on weekends: [phone], press option #4 and ask to speak to the on-call Cardiologist.

## 2022-03-28 NOTE — LETTER
3/28/2022      RE: Prince Pereira  [address]  [facility] [address]       Dear Colleague,    Thank you for the opportunity to participate in the care of your patient, Prince Pereira, at the [facility] [place] [facility]. Please see a copy of my visit note below.    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     His CAD risk factor profile is notable for: +HTN, +HLD, + former tobacco use (college), - DM, possible family history of CAD (materal grandfather  of cardiac event 63).     Patient follows with Dr. Vera Rodriguez for a-fib management. He underwent pulmonary vein isolation for AF on 2014 and warfarin/metoprolol were discontinued 2014. He's had two episodes of recurrent AF since : one in setting of severe back pain lasted 4 hours May 2017; the other one on 2020 when he was on vacation, carrying fishing gear, lasted 2-3 hours. He had no further episodes of AF until 2021 - he was at work when he felt an irregular rhythm consistent with his AF, his pulse was around 100 bpm. He did not feel palpitations as much as he did previously. Episode lasted about 3 hours and terminated spontaneously. Last evaluated by Dr. Vera Rodriguez 10/2021, anticoagulation was discussed, he wore a 14 day monitor which showed no recurrent a-fib; therefore, patient deferred anticoagulation. At his visit he was noted to be hypertensive, his metoprolol was discontinued and he was started on losartan. Recent echo 2022 shows normal LV function.     For about the past month he has been experiencing episodes of dull, sometimes squeezing left sided chest pain. The episodes occur multiple times a day, waxing and wane over the course of 30 minutes to an hour. His chest pain is not related to exertion, sometimes notices it in the  middle of the night. He is still able to exert himself without limitation, but has been doing less out of concern.  He denies radiation of the pain, no associated nausea, diaphoresis. Nothing makes the pain better or worse. He thought it could be related to reflux, took some omeprazole with no relief. Also took naproxen thinking it could be musculoskeletal with no improvement. Does not feel like his a-fib episodes with he reports as palpitations and decreased stamina. He otherwise denies shortness of breath, orthopnea, PND, leg swelling, palpitations, lightheadedness, syncope.     Home blood pressures generally 130s-140s/80s.     PAST MEDICAL HISTORY:  1. Atrial fibrillation, first episode , s/p PVI 2014, prolonged hospital stay due to femoral hematoma. Multiple cardioversions prior to PVI. Recurrent  and May 2020, each time 2-4 hours. Recurrent 2021, 3 hours.  2. Pulmonary nodules  3. Psoriatic arthritis  4. Herpes zoster  5. Hypertension  6. Hyperlipidemia     CURRENT MEDICATIONS:  Atorvastatin 20 every day  Losartan 50 mg   Enbrel injections    ALLERGIES:     Allergies   Allergen Reactions     Other [No Clinical Screening - See Comments] Rash     Cardioversion pads cause rash.       FAMILY HISTORY:  Family History   Problem Relation Age of Onset     C.A.D. Maternal Grandfather          age 63     Hypertension Maternal Grandfather      Hypertension Mother      Alzheimer Disease Mother      Prostate Cancer Father          age 60 agressive prostate ca     Alzheimer Disease Maternal Grandmother      Glaucoma No family hx of      Macular Degeneration No family hx of        SOCIAL HISTORY:  Social History     Tobacco Use     Smoking status: Former Smoker     Years: 1.00     Types: Cigarettes     Quit date: 1980     Years since quittin.2     Smokeless tobacco: Never Used   Substance Use Topics     Alcohol use: Yes     Alcohol/week: 8.3 standard drinks     Types: 10 Standard drinks or  "equivalent per week     Comment: occasional     Drug use: No       ROS:  10 point ROS neg other than the symptoms noted above in the HPI.    I have personally reviewed the following:     EKG: 3/28/22: sinus 72 bpm, normal intervals, no ischemia or infarction     ECHOCARDIOGRAM 2/2022:  Normal biventricular function without WMA, no valvular disease, mild aortic dilation 4.2 cm    PATCH MONITOR 8/31-9/12/2021:  Sinus average 71 bpm, range  bpm.  PVCs 2%, PACs <1%.  Patient triggered events = PACs and PVCs (accidentally - he did not intentionally trigger any specific events)  NSVT 6 beats 9/8 at 3:41 pm - no symptoms.  Runs of PACs longest 31 seconds at 122 bpm, no symptoms.  No AFib detected.     Exam:  BP (!) 148/84 (BP Location: Right arm, Patient Position: Sitting, Cuff Size: Adult Regular)   Pulse 75   Ht 1.774 m (5' 9.84\")   Wt 72.9 kg (160 lb 11.2 oz)   SpO2 99%   BMI 23.16 kg/m    CONSITUTIONAL: no acute distress  HEENT: no icterus, no redness or discharge, neck supple  CV: no visible edema of visualized extremities. No JVD.   RESPIRATORY: respirations nonlabored, no cough  NEURO: AA&Ox3, speech fluent/appropriate, motor grossly nonfocal  PSYCH: cooperative, affect appropriate  DERM: no rashes on visualized face/neck/upper extremities    Assessment and Plan:   This is a 66-year-old male with a past medical history of a-fib, hypertension, hyperlipidemia and former tobacco use who presents for evaluation of atypical chest pain. He describes dull nonexertional chest pain that waxes and wanes over the course of the day. His EKG today shows normal sinus rhythm without ischemic changes.  Recent echo shows normal biventricular function without wall motion abnormalities. Given CAD risk factors would recommend coronary CTA to rule out obstructive coronary disease.  His blood pressure is above goal, plan to increase losartan to 100 mg daily with repeat BMP in 1 week.  We will see patient back in clinic in 1 " month for ongoing hypertension management and to review results of coronary CTA.    ARASH Mas, CNP  Structural Heart Nurse Practitioner  [facility] [place]  Clinic: [phone]  Pager: [phone]

## 2022-03-28 NOTE — NURSING NOTE
Chief Complaint   Patient presents with     Follow Up     evaluation due to this chest pain per Dr. Rodriguez   Vitals were taken, medications reconciled, and EKG was performed.    Yao Carvalho, EMT  7:27 AM

## 2022-04-04 ENCOUNTER — HOSPITAL ENCOUNTER (OUTPATIENT)
Dept: CARDIOLOGY | Facility: CLINIC | Age: 67
Discharge: HOME | End: 2022-04-04
Attending: NURSE PRACTITIONER
Payer: COMMERCIAL

## 2022-04-04 ENCOUNTER — LAB (OUTPATIENT)
Dept: LAB | Facility: CLINIC | Age: 67
End: 2022-04-04
Attending: NURSE PRACTITIONER
Payer: COMMERCIAL

## 2022-04-04 VITALS — HEART RATE: 63 BPM | DIASTOLIC BLOOD PRESSURE: 66 MMHG | SYSTOLIC BLOOD PRESSURE: 126 MMHG

## 2022-04-04 DIAGNOSIS — R07.9 CHEST PAIN, UNSPECIFIED TYPE: ICD-10-CM

## 2022-04-04 DIAGNOSIS — I10 BENIGN ESSENTIAL HYPERTENSION: ICD-10-CM

## 2022-04-04 LAB
ANION GAP SERPL CALCULATED.3IONS-SCNC: <1 MMOL/L (ref 3–14)
BUN SERPL-MCNC: 11 MG/DL (ref 7–30)
CALCIUM SERPL-MCNC: 9.1 MG/DL (ref 8.5–10.1)
CHLORIDE BLD-SCNC: 109 MMOL/L (ref 94–109)
CO2 SERPL-SCNC: 29 MMOL/L (ref 20–32)
CREAT SERPL-MCNC: 0.84 MG/DL (ref 0.66–1.25)
GFR SERPL CREATININE-BSD FRML MDRD: >90 ML/MIN/1.73M2
GLUCOSE BLD-MCNC: 103 MG/DL (ref 70–99)
POTASSIUM BLD-SCNC: 4.1 MMOL/L (ref 3.4–5.3)
SODIUM SERPL-SCNC: 138 MMOL/L (ref 133–144)

## 2022-04-04 PROCEDURE — 250N000013 HC RX MED GY IP 250 OP 250 PS 637: Performed by: NURSE PRACTITIONER

## 2022-04-04 PROCEDURE — 75574 CT ANGIO HRT W/3D IMAGE: CPT | Mod: 26 | Performed by: INTERNAL MEDICINE

## 2022-04-04 PROCEDURE — 80048 BASIC METABOLIC PNL TOTAL CA: CPT

## 2022-04-04 PROCEDURE — 250N000009 HC RX 250: Performed by: NURSE PRACTITIONER

## 2022-04-04 PROCEDURE — 75574 CT ANGIO HRT W/3D IMAGE: CPT

## 2022-04-04 PROCEDURE — 250N000011 HC RX IP 250 OP 636: Performed by: NURSE PRACTITIONER

## 2022-04-04 PROCEDURE — 36415 COLL VENOUS BLD VENIPUNCTURE: CPT

## 2022-04-04 RX ORDER — ONDANSETRON 2 MG/ML
4 INJECTION INTRAMUSCULAR; INTRAVENOUS
Status: DISCONTINUED | OUTPATIENT
Start: 2022-04-04 | End: 2022-04-05 | Stop reason: HOSPADM

## 2022-04-04 RX ORDER — IOPAMIDOL 755 MG/ML
110 INJECTION, SOLUTION INTRAVASCULAR ONCE
Status: COMPLETED | OUTPATIENT
Start: 2022-04-04 | End: 2022-04-04

## 2022-04-04 RX ORDER — DIPHENHYDRAMINE HYDROCHLORIDE 50 MG/ML
25-50 INJECTION INTRAMUSCULAR; INTRAVENOUS
Status: DISCONTINUED | OUTPATIENT
Start: 2022-04-04 | End: 2022-04-05 | Stop reason: HOSPADM

## 2022-04-04 RX ORDER — DILTIAZEM HYDROCHLORIDE 5 MG/ML
10-15 INJECTION INTRAVENOUS
Status: DISCONTINUED | OUTPATIENT
Start: 2022-04-04 | End: 2022-04-05 | Stop reason: HOSPADM

## 2022-04-04 RX ORDER — DILTIAZEM HCL 60 MG
120 TABLET ORAL
Status: DISCONTINUED | OUTPATIENT
Start: 2022-04-04 | End: 2022-04-05 | Stop reason: HOSPADM

## 2022-04-04 RX ORDER — METOPROLOL TARTRATE 1 MG/ML
5-15 INJECTION, SOLUTION INTRAVENOUS
Status: DISCONTINUED | OUTPATIENT
Start: 2022-04-04 | End: 2022-04-05 | Stop reason: HOSPADM

## 2022-04-04 RX ORDER — ACYCLOVIR 200 MG/1
0-1 CAPSULE ORAL
Status: DISCONTINUED | OUTPATIENT
Start: 2022-04-04 | End: 2022-04-05 | Stop reason: HOSPADM

## 2022-04-04 RX ORDER — IVABRADINE 5 MG/1
5-15 TABLET, FILM COATED ORAL
Status: COMPLETED | OUTPATIENT
Start: 2022-04-04 | End: 2022-04-04

## 2022-04-04 RX ORDER — METHYLPREDNISOLONE SODIUM SUCCINATE 125 MG/2ML
125 INJECTION, POWDER, LYOPHILIZED, FOR SOLUTION INTRAMUSCULAR; INTRAVENOUS
Status: DISCONTINUED | OUTPATIENT
Start: 2022-04-04 | End: 2022-04-05 | Stop reason: HOSPADM

## 2022-04-04 RX ORDER — NITROGLYCERIN 0.4 MG/1
0.4 TABLET SUBLINGUAL
Status: DISCONTINUED | OUTPATIENT
Start: 2022-04-04 | End: 2022-04-05 | Stop reason: HOSPADM

## 2022-04-04 RX ORDER — METOPROLOL TARTRATE 25 MG/1
25-100 TABLET, FILM COATED ORAL
Status: COMPLETED | OUTPATIENT
Start: 2022-04-04 | End: 2022-04-04

## 2022-04-04 RX ORDER — DIPHENHYDRAMINE HCL 25 MG
25 CAPSULE ORAL
Status: DISCONTINUED | OUTPATIENT
Start: 2022-04-04 | End: 2022-04-05 | Stop reason: HOSPADM

## 2022-04-04 RX ADMIN — METOPROLOL TARTRATE 5 MG: 5 INJECTION INTRAVENOUS at 11:33

## 2022-04-04 RX ADMIN — METOPROLOL TARTRATE 10 MG: 5 INJECTION INTRAVENOUS at 11:21

## 2022-04-04 RX ADMIN — METOPROLOL TARTRATE 50 MG: 50 TABLET, FILM COATED ORAL at 10:08

## 2022-04-04 RX ADMIN — NITROGLYCERIN 0.4 MG: 0.4 TABLET SUBLINGUAL at 11:33

## 2022-04-04 RX ADMIN — IVABRADINE 10 MG: 5 TABLET, FILM COATED ORAL at 10:08

## 2022-04-04 RX ADMIN — IOPAMIDOL 110 ML: 755 INJECTION, SOLUTION INTRAVENOUS at 11:56

## 2022-04-04 RX ADMIN — METOPROLOL TARTRATE 10 MG: 5 INJECTION INTRAVENOUS at 11:08

## 2022-04-04 RX ADMIN — METOPROLOL TARTRATE 10 MG: 5 INJECTION INTRAVENOUS at 11:14

## 2022-04-05 DIAGNOSIS — I48.0 PAROXYSMAL ATRIAL FIBRILLATION (H): Primary | ICD-10-CM

## 2022-04-05 DIAGNOSIS — I77.819 DILATION OF DESCENDING AORTA (H): ICD-10-CM

## 2022-04-05 DIAGNOSIS — I77.810 ASCENDING AORTA DILATION (H): ICD-10-CM

## 2022-04-05 NOTE — PROGRESS NOTES
Date: 4/5/2022    Time of Call: 7:49 AM     Diagnosis:  Dilation of ascending aorta     [ TORB ] Ordering provider: Lizzette Flores CNP  Order: ECHO     Order received by: Stacy Santamaria RN     Follow-up/additional notes: mildly dilated proximal ascending aorta of 4.4 cm

## 2022-04-18 ENCOUNTER — MYC MEDICAL ADVICE (OUTPATIENT)
Dept: RHEUMATOLOGY | Facility: CLINIC | Age: 67
End: 2022-04-18
Payer: COMMERCIAL

## 2022-04-18 DIAGNOSIS — L40.50 PSORIATIC ARTHRITIS (H): Primary | ICD-10-CM

## 2022-04-18 DIAGNOSIS — M94.0 COSTOCHONDRITIS: ICD-10-CM

## 2022-04-22 RX ORDER — COLCHICINE 0.6 MG/1
0.6 TABLET ORAL DAILY
Qty: 30 TABLET | Refills: 1 | Status: SHIPPED | OUTPATIENT
Start: 2022-04-22 | End: 2022-10-13

## 2022-04-22 RX ORDER — MEDROXYPROGESTERONE ACETATE 150 MG/ML
50 INJECTION, SUSPENSION INTRAMUSCULAR WEEKLY
Qty: 4 ML | Refills: 5 | OUTPATIENT
Start: 2022-04-22 | End: 2022-04-25

## 2022-04-22 NOTE — TELEPHONE ENCOUNTER
Symptoms could reflect a manifestation of psoriatic arthritis with costochondritis/serositis as a cause of intermittent chest tightness and pain.  I recommend a trial of colchicine 0.6 mg twice daily for 7 days.  If there is significant relief from chest symptoms, I recommend then reducing colchicine to 0.6 mg daily as needed for costochondritis.  I do not recommend chronic daily use of colchicine because of potential interaction with statin medication.

## 2022-04-22 NOTE — TELEPHONE ENCOUNTER
Reviewed patient's request to increase frequency of etanercept (Enbrel) dosing  50 mg injected every 10 days to 50 mg injected every 7 days with Dr. Pandey, received verbal order for requested change. Patient notified of change in directions via [employer] response to his message.   Leonie Hernandez RN  [facility]

## 2022-04-23 NOTE — PROGRESS NOTES
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     Patient follows with Dr. Vera Rodriguez for a-fib management. He underwent pulmonary vein isolation for AF on 2/13/2014 and warfarin/metoprolol were discontinued 4/2014. He's had two episodes of recurrent AF since 2014: one in setting of severe back pain lasted 4 hours May 2017; the other one on 5/23/2020 when he was on vacation, carrying fishing gear, lasted 2-3 hours. He had no further episodes of AF until 7/29/2021 - he was at work when he felt an irregular rhythm consistent with his AF, his pulse was around 100 bpm. He did not feel palpitations as much as he did previously. Episode lasted about 3 hours and terminated spontaneously.     Evaluated by Dr. Vera Rodriguez 10/2021, anticoagulation was discussed, he wore a 14 day monitor which showed no recurrent a-fib; therefore, patient deferred anticoagulation. At his visit he was noted to be hypertensive, his metoprolol was discontinued and he was started on losartan. ECHO February 2022 shows normal LV function.     He was seen by me in March 2022 for atypical chest pain, underwent coronary CTA which showed mild nonobstructive CAD and dilated ascending aorta 4.4 cm He was noted to be hypertensive and his losartan 100 mg daily.     Since his last visit he reports feeling well. His chest discomfort has improved. He was seen by rheumatology who thinks it may be related to costochondritis. He was prescribed colchicine but may hold off on taking it since his chest discomfort is getting better. He is requiring less Naproxen over the last week. He otherwise denies any shortness of breath, orthopnea, PND, leg swelling, weight gain, palpitations, lightheadedness or syncope.     Home blood pressures have been  average 119/80, today his home measurement was 124/70.    PAST MEDICAL HISTORY:  1. Atrial fibrillation, first episode , s/p PVI 2014, prolonged hospital stay due to femoral hematoma. Multiple cardioversions prior to PVI. Recurrent  and May 2020, each time 2-4 hours. Recurrent 2021, 3 hours.  2. Pulmonary nodules  3. Psoriatic arthritis  4. Herpes zoster  5. Hypertension  6. Hyperlipidemia   7. Dilated ascending aorta 4.4 cm 3/2022    CURRENT MEDICATIONS:  Atorvastatin 20 every day  Losartan 50 mg   Enbrel injections    ALLERGIES:     Allergies   Allergen Reactions     Other [No Clinical Screening - See Comments] Rash     Cardioversion pads cause rash.       FAMILY HISTORY:  Family History   Problem Relation Age of Onset     C.A.D. Maternal Grandfather          age 63     Hypertension Maternal Grandfather      Hypertension Mother      Alzheimer Disease Mother      Prostate Cancer Father          age 60 agressive prostate ca     Alzheimer Disease Maternal Grandmother      Glaucoma No family hx of      Macular Degeneration No family hx of        SOCIAL HISTORY:  Social History     Tobacco Use     Smoking status: Former Smoker     Years: 1.00     Types: Cigarettes     Quit date: 1980     Years since quittin.3     Smokeless tobacco: Never Used   Substance Use Topics     Alcohol use: Yes     Alcohol/week: 8.3 standard drinks     Types: 10 Standard drinks or equivalent per week     Comment: occasional     Drug use: No       ROS:  10 point ROS neg other than the symptoms noted above in the HPI.    I have personally reviewed the following:          Coronary CTA 3/2022:                                                           IMPRESSION:  1.   Mild nonobstructive coronary artery disease with mild proximal  right coronary artery stenosis.  2.   Dilated proximal ascending aorta measuring 4.4 cm x 4 cm. Mildly  dilated aortic root measuring 3.8 cm.  3.  Total Agatston score 39.1 placing the  patient in the 36 percentile  when compared to age and gender matched control group.  4.  Please review Radiology report for incidental noncardiac findings  that will follow separately.     FINDINGS:     CORONARY CALCIUM SCORE     Total Agatston calcium score: 39.1   Left main: 0  Left anterior descendin  Left circumflex: 0  Right coronary artery: 39.1   This places the patient in the 36 percentile when compared to age and  gender matched control group.     CORONARY ANGIOGRAPHY     DOMINANCE: Right dominant system.   Normal coronary origins and course.     LEFT MAIN:   The left main arises normally from the left coronary cusp and is  widely patent without any detectable stenosis.      LEFT ANTERIOR DESCENDING:   The left anterior descending and its major diagonal branches are  widely patent without any detectable stenosis. The distal left  anterior descending artery wraps around the apex. First diagonal is a  small sized artery and the second diagonal is a moderate size artery.  The diagonal branches appear patent in the proximal and midsegments,  the very distal segments are not well visualized due to poor  opacification.     RAMUS INTERMEDIUS  It is a large size vessel which appears patent without detectable  stenosis.     LEFT CIRCUMFLEX:   The left circumflex is a large size vessel which appears patent  without detectable stenosis        RIGHT CORONARY ARTERY:   Proximal right coronary artery has mild stenosis due to calcified  plaque (25-49%). The distal right coronary artery gives rise to right  posterior descending artery and right posterolateral artery. The  proximal segments of the right posterior descending artery and right  posterolateral artery appear patent, the rest of the segments are not  well-visualized due to poor opacification.        ADDITIONAL FINDINGS:      Proximal ascending aorta dilated measuring 4.4 cm x 4 cm. Aortic root  is dilated measuring 3.8 cm..   Normal pulmonary venous anatomy  with all four pulmonary veins draining  into the left atrium.    There is no left ventricular mass or thrombus.   Normal pericardial thickness. There is no pericardial effusion.  Please review Radiology report for incidental noncardiac findings that  will follow separately.       EKG: 3/28/22: sinus 72 bpm, normal intervals, no ischemia or infarction     ECHOCARDIOGRAM 2/2022:  Normal biventricular function without WMA, no valvular disease, mild aortic dilation 4.2 cm    PATCH MONITOR 8/31-9/12/2021:  Sinus average 71 bpm, range  bpm.  PVCs 2%, PACs <1%.  Patient triggered events = PACs and PVCs (accidentally - he did not intentionally trigger any specific events)  NSVT 6 beats 9/8 at 3:41 pm - no symptoms.  Runs of PACs longest 31 seconds at 122 bpm, no symptoms.  No AFib detected.     Exam:  BP (!) 141/85 (BP Location: Right arm, Patient Position: Sitting, Cuff Size: Adult Regular)   Pulse 79   Wt 74.7 kg (164 lb 11.2 oz)   SpO2 99%   BMI 23.74 kg/m    CONSITUTIONAL: no acute distress  HEENT: no icterus, no redness or discharge, neck supple  CV: no visible edema of visualized extremities. No JVD.   RESPIRATORY: respirations nonlabored, no cough  NEURO: AA&Ox3, speech fluent/appropriate, motor grossly nonfocal  PSYCH: cooperative, affect appropriate  DERM: no rashes on visualized face/neck/upper extremities    Assessment and Plan:   This is a 66-year-old male with a past medical history of paroxysmal a-fib, hypertension, hyperlipidemia, mild CAD and ascending aortic aneurysm 4.4 cm (Coronary CT 4/2022) who presents for HTN follow-up.    - BP well controlled, continue current regimen  - Lipids well controlled, continue atorvastatin 20 mg daily  - Recommend AC for a-fib, patient declined, will purchase Kardiamobile and monitor rhythm at home   - ECHO in 6 months to monitor dilated aorta  - RTC 6 months with echo and labs prior     ARASH Mas, CNP  Structural Heart Nurse Practitioner  [facility]  [facility]: [phone]  Pager: [phone]

## 2022-04-25 ENCOUNTER — OFFICE VISIT (OUTPATIENT)
Dept: CARDIOLOGY | Facility: CLINIC | Age: 67
End: 2022-04-25
Attending: NURSE PRACTITIONER
Payer: COMMERCIAL

## 2022-04-25 VITALS
SYSTOLIC BLOOD PRESSURE: 141 MMHG | DIASTOLIC BLOOD PRESSURE: 85 MMHG | HEART RATE: 79 BPM | OXYGEN SATURATION: 99 % | BODY MASS INDEX: 23.74 KG/M2 | WEIGHT: 164.7 LBS

## 2022-04-25 DIAGNOSIS — I10 BENIGN ESSENTIAL HYPERTENSION: Primary | ICD-10-CM

## 2022-04-25 DIAGNOSIS — I77.810 ASCENDING AORTA DILATION (H): ICD-10-CM

## 2022-04-25 DIAGNOSIS — I48.0 PAROXYSMAL ATRIAL FIBRILLATION (H): ICD-10-CM

## 2022-04-25 DIAGNOSIS — E78.00 HYPERCHOLESTEREMIA: ICD-10-CM

## 2022-04-25 PROCEDURE — G0463 HOSPITAL OUTPT CLINIC VISIT: HCPCS

## 2022-04-25 PROCEDURE — 99214 OFFICE O/P EST MOD 30 MIN: CPT | Performed by: NURSE PRACTITIONER

## 2022-04-25 RX ORDER — ETANERCEPT 50 MG/ML
50 SOLUTION SUBCUTANEOUS WEEKLY
Qty: 4 ML | Refills: 5 | Status: SHIPPED | OUTPATIENT
Start: 2022-04-25 | End: 2022-10-29

## 2022-04-25 ASSESSMENT — PAIN SCALES - GENERAL: PAINLEVEL: NO PAIN (0)

## 2022-04-25 NOTE — LETTER
4/25/2022    RE: Prince Pereira  [address]  [facility] [address]     Dear Colleague,    Thank you for the opportunity to participate in the care of your patient, Prince Pereira, at the [facility] [place] [facility]. Please see a copy of my visit note below.    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     Patient follows with Dr. Vera Rodriguez for a-fib management. He underwent pulmonary vein isolation for AF on 2/13/2014 and warfarin/metoprolol were discontinued 4/2014. He's had two episodes of recurrent AF since 2014: one in setting of severe back pain lasted 4 hours May 2017; the other one on 5/23/2020 when he was on vacation, carrying fishing gear, lasted 2-3 hours. He had no further episodes of AF until 7/29/2021 - he was at work when he felt an irregular rhythm consistent with his AF, his pulse was around 100 bpm. He did not feel palpitations as much as he did previously. Episode lasted about 3 hours and terminated spontaneously.     Evaluated by Dr. Vera Rodriguez 10/2021, anticoagulation was discussed, he wore a 14 day monitor which showed no recurrent a-fib; therefore, patient deferred anticoagulation. At his visit he was noted to be hypertensive, his metoprolol was discontinued and he was started on losartan. ECHO February 2022 shows normal LV function.     He was seen by me in March 2022 for atypical chest pain, underwent coronary CTA which showed mild nonobstructive CAD and dilated ascending aorta 4.4 cm He was noted to be hypertensive and his losartan 100 mg daily.     Since his last visit he reports feeling well. His chest discomfort has improved. He was seen by rheumatology who thinks  it may be related to costochondritis. He was prescribed colchicine but may hold off on taking it since his chest discomfort is getting better. He is requiring less Naproxen over the last week. He otherwise denies any shortness of breath, orthopnea, PND, leg swelling, weight gain, palpitations, lightheadedness or syncope.     Home blood pressures have been average 119/80, today his home measurement was 124/70.    PAST MEDICAL HISTORY:  1. Atrial fibrillation, first episode , s/p PVI 2014, prolonged hospital stay due to femoral hematoma. Multiple cardioversions prior to PVI. Recurrent  and May 2020, each time 2-4 hours. Recurrent 2021, 3 hours.  2. Pulmonary nodules  3. Psoriatic arthritis  4. Herpes zoster  5. Hypertension  6. Hyperlipidemia   7. Dilated ascending aorta 4.4 cm 3/2022    CURRENT MEDICATIONS:  Atorvastatin 20 every day  Losartan 50 mg   Enbrel injections    ALLERGIES:     Allergies   Allergen Reactions     Other [No Clinical Screening - See Comments] Rash     Cardioversion pads cause rash.       FAMILY HISTORY:  Family History   Problem Relation Age of Onset     C.A.D. Maternal Grandfather          age 63     Hypertension Maternal Grandfather      Hypertension Mother      Alzheimer Disease Mother      Prostate Cancer Father          age 60 agressive prostate ca     Alzheimer Disease Maternal Grandmother      Glaucoma No family hx of      Macular Degeneration No family hx of        SOCIAL HISTORY:  Social History     Tobacco Use     Smoking status: Former Smoker     Years: 1.00     Types: Cigarettes     Quit date: 1980     Years since quittin.3     Smokeless tobacco: Never Used   Substance Use Topics     Alcohol use: Yes     Alcohol/week: 8.3 standard drinks     Types: 10 Standard drinks or equivalent per week     Comment: occasional     Drug use: No       ROS:  10 point ROS neg other than the symptoms noted above in the HPI.    I have personally reviewed the  following:          Coronary CTA 3/2022:                                                           IMPRESSION:  1.   Mild nonobstructive coronary artery disease with mild proximal  right coronary artery stenosis.  2.   Dilated proximal ascending aorta measuring 4.4 cm x 4 cm. Mildly  dilated aortic root measuring 3.8 cm.  3.  Total Agatston score 39.1 placing the patient in the 36 percentile  when compared to age and gender matched control group.  4.  Please review Radiology report for incidental noncardiac findings  that will follow separately.     FINDINGS:     CORONARY CALCIUM SCORE     Total Agatston calcium score: 39.1   Left main: 0  Left anterior descendin  Left circumflex: 0  Right coronary artery: 39.1   This places the patient in the 36 percentile when compared to age and  gender matched control group.     CORONARY ANGIOGRAPHY     DOMINANCE: Right dominant system.   Normal coronary origins and course.     LEFT MAIN:   The left main arises normally from the left coronary cusp and is  widely patent without any detectable stenosis.      LEFT ANTERIOR DESCENDING:   The left anterior descending and its major diagonal branches are  widely patent without any detectable stenosis. The distal left  anterior descending artery wraps around the apex. First diagonal is a  small sized artery and the second diagonal is a moderate size artery.  The diagonal branches appear patent in the proximal and midsegments,  the very distal segments are not well visualized due to poor  opacification.     RAMUS INTERMEDIUS  It is a large size vessel which appears patent without detectable  stenosis.     LEFT CIRCUMFLEX:   The left circumflex is a large size vessel which appears patent  without detectable stenosis        RIGHT CORONARY ARTERY:   Proximal right coronary artery has mild stenosis due to calcified  plaque (25-49%). The distal right coronary artery gives rise to right  posterior descending artery and right posterolateral  artery. The  proximal segments of the right posterior descending artery and right  posterolateral artery appear patent, the rest of the segments are not  well-visualized due to poor opacification.        ADDITIONAL FINDINGS:      Proximal ascending aorta dilated measuring 4.4 cm x 4 cm. Aortic root  is dilated measuring 3.8 cm..   Normal pulmonary venous anatomy with all four pulmonary veins draining  into the left atrium.    There is no left ventricular mass or thrombus.   Normal pericardial thickness. There is no pericardial effusion.  Please review Radiology report for incidental noncardiac findings that  will follow separately.       EKG: 3/28/22: sinus 72 bpm, normal intervals, no ischemia or infarction     ECHOCARDIOGRAM 2/2022:  Normal biventricular function without WMA, no valvular disease, mild aortic dilation 4.2 cm    PATCH MONITOR 8/31-9/12/2021:  Sinus average 71 bpm, range  bpm.  PVCs 2%, PACs <1%.  Patient triggered events = PACs and PVCs (accidentally - he did not intentionally trigger any specific events)  NSVT 6 beats 9/8 at 3:41 pm - no symptoms.  Runs of PACs longest 31 seconds at 122 bpm, no symptoms.  No AFib detected.     Exam:  BP (!) 141/85 (BP Location: Right arm, Patient Position: Sitting, Cuff Size: Adult Regular)   Pulse 79   Wt 74.7 kg (164 lb 11.2 oz)   SpO2 99%   BMI 23.74 kg/m    CONSITUTIONAL: no acute distress  HEENT: no icterus, no redness or discharge, neck supple  CV: no visible edema of visualized extremities. No JVD.   RESPIRATORY: respirations nonlabored, no cough  NEURO: AA&Ox3, speech fluent/appropriate, motor grossly nonfocal  PSYCH: cooperative, affect appropriate  DERM: no rashes on visualized face/neck/upper extremities    Assessment and Plan:   This is a 66-year-old male with a past medical history of paroxysmal a-fib, hypertension, hyperlipidemia, mild CAD and ascending aortic aneurysm 4.4 cm (Coronary CT 4/2022) who presents for HTN follow-up.    - BP well  controlled, continue current regimen  - Lipids well controlled, continue atorvastatin 20 mg daily  - Recommend AC for a-fib, patient declined, will purchase Kardiamobile and monitor rhythm at home   - ECHO in 6 months to monitor dilated aorta  - RTC 6 months with echo and labs prior     Lizzette ARASH Flores, CNP  Structural Heart Nurse Practitioner  [facility]  Clinic: [phone]  Pager: [phone]

## 2022-04-25 NOTE — PATIENT INSTRUCTIONS
You were seen today in the Cardiovascular Clinic at the [facility].    Cardiology provider you saw during your visit Lizzette Flores NP.    1.Continue current medication regimen given well controlled BP.   2. Start monitoring sodium intake and keep below 2300 mg daily, also avoid NSAIDS if able.   3. Continue heart healthy diet and daily exercise.  4. Purchase KardiaMobile and start monitoring heart rhythm a few times a week.   5. Follow-up with 6 months with Dr. Rodriguez, echo and labs prior to visit.   6. Happy to see you back in the meantime if needed.     Questions and [phone].   First press #1 for the Sina and then press #3 for Medical Questions to reach the Cardiology triage nurse.     On Call Cardiologist for after hours or on weekends: [phone], press option #4 and ask to speak to the on-call Cardiologist.

## 2022-04-25 NOTE — NURSING NOTE
Chief Complaint   Patient presents with     Follow Up     1 mo follow up for HTN   Vitals were taken and medications reconciled.    Yao Carvalho, EMT  7:24 AM

## 2022-04-25 NOTE — TELEPHONE ENCOUNTER
Etanercept (Enbrel) order updated to pre-filled syringes per patient request after auto injectors were ordered in error.   Leonie Hernandez RN  Adult [facility]ology [facility]      The patient is a 59y Male complaining of abdominal pain.

## 2022-05-24 ASSESSMENT — ENCOUNTER SYMPTOMS
NECK PAIN: 0
JAUNDICE: 0
MUSCLE CRAMPS: 0
JOINT SWELLING: 0
BLOATING: 0
NAUSEA: 0
RECTAL PAIN: 1
MYALGIAS: 0
HEARTBURN: 1
ABDOMINAL PAIN: 0
CONSTIPATION: 0
ARTHRALGIAS: 1
DIARRHEA: 0
BLOOD IN STOOL: 0
BOWEL INCONTINENCE: 0
BACK PAIN: 0
VOMITING: 0
MUSCLE WEAKNESS: 0
STIFFNESS: 1

## 2022-05-25 ENCOUNTER — OFFICE VISIT (OUTPATIENT)
Dept: SURGERY | Facility: CLINIC | Age: 67
End: 2022-05-25
Attending: INTERNAL MEDICINE
Payer: COMMERCIAL

## 2022-05-25 ENCOUNTER — PRE VISIT (OUTPATIENT)
Dept: SURGERY | Facility: CLINIC | Age: 67
End: 2022-05-25

## 2022-05-25 VITALS
HEART RATE: 83 BPM | BODY MASS INDEX: 23.72 KG/M2 | DIASTOLIC BLOOD PRESSURE: 81 MMHG | HEIGHT: 70 IN | WEIGHT: 165.7 LBS | SYSTOLIC BLOOD PRESSURE: 124 MMHG | OXYGEN SATURATION: 100 %

## 2022-05-25 DIAGNOSIS — R19.7 DIARRHEA, UNSPECIFIED TYPE: ICD-10-CM

## 2022-05-25 DIAGNOSIS — K64.8 HEMORRHOID PROLAPSE: Primary | ICD-10-CM

## 2022-05-25 PROCEDURE — 99202 OFFICE O/P NEW SF 15 MIN: CPT | Performed by: NURSE PRACTITIONER

## 2022-05-25 ASSESSMENT — PAIN SCALES - GENERAL: PAINLEVEL: NO PAIN (0)

## 2022-05-25 NOTE — LETTER
"2022       RE: Prince Pereira  [address]  [facility] [address]     Dear Colleague,    Thank you for referring your patient, Prince Pereira, to the [facility] [place] at . Please see a copy of my visit note below.    Colon and Rectal Surgery Consult Clinic Note    Date: 2022     Referring provider:  Brendan Ndiaye MD  [address] [place] [address]     RE: Prince Pereira  : 1955  NENA: 2022    Prince Pereira is a very pleasant 66 year old male who presents today for hemorrhoids.    HPI:  Has had hemorrhoids that have developed over the past few years. Periodically used hydrocortisone cream. Has several days without bowel movements followed by multiple bowel movements in a day that get increasingly loose. He has to sit for a long time. Tried fiber for a while. No constipation. Hemorrhoids get very inflamed after multiple bowel movements. These leak fluid when inflamed. Only a small amount of blood tinged drainage. Colonoscopy 2018 was normal. History of atrial fibrillation but not on any blood thinner.    Physical Examination: Exam was chaperoned by Keyla Loja, EMT   /81 (BP Location: Left arm, Patient Position: Sitting, Cuff Size: Adult Regular)   Pulse 83   Ht 5' 10\"   Wt 165 lb 11.2 oz   SpO2 100%   BMI 23.78 kg/m    General: alert, oriented, in no acute distress, sitting comfortably  HEENT: mucous membranes moist  Perianal external examination:  Perianal skin: Intact with no excoriation or lichenification.  Lesions: No evidence of an external lesion, nodularity, or induration in the perianal region.  Eversion of buttocks: There was not evidence of an anal fissure. Details: N/A.  Skin tags or external hemorrhoids: Yes: prolapsing hemorrhoids in the right and left lateral positions.  Digital rectal " examination: Was performed.   Sphincter tone: Good.  Palpable lesions: No.  Prostate: Abnormal: enlarged but without nodularity.  Other: None.    Anoscopy: Was performed.   Hemorrhoids: Yes. Grade 2-3 internal hemorrhoids without active bleeding  Lesions: No    Assessment/Plan: 66 year old male with hemorrhoid prolapse and diarrhea.  We discussed the importance of regulating his bowel movements.  I recommended a daily fiber supplement and he can slowly ramp this up to 3 times a day if needed.  If this fails to resolve his diarrhea, will have him meet with gastroenterology.  We discussed options for managing his hemorrhoids including hemorrhoid banding.  However, he does not feel that the hemorrhoids bother him unless he has multiple bowel movements in a day so would prefer to try to improve his stool consistency first.  We will have him return to clinic after 6 to 8 weeks for hemorrhoid banding if hemorrhoids are still symptomatic. Patient's questions were answered to his stated satisfaction and he is in agreement with this plan.    Medical history:  Past Medical History:   Diagnosis Date     Atrial fibrillation (H)     paroxysmal with ablation 2014     Family history of prostate cancer     father  age 60     Herpes zoster 3/26/2013     Almendarez's neuroma of right foot      Psoriasis      psoriatic arthritis      Pulmonary nodules 2014    multiple noncalcified up to 6 mm needs f/u       Surgical history:  Past Surgical History:   Procedure Laterality Date     ANESTHESIA CARDIOVERSION  10/6/2011    Procedure:ANESTHESIA CARDIOVERSION; CARDIOVERSION; Surgeon:GENERIC ANESTHESIA PROVIDER; Location:[facility] OR     ANESTHESIA CARDIOVERSION  2/10/2012    Procedure:ANESTHESIA CARDIOVERSION; CARDIOVERSION; Surgeon:GENERIC ANESTHESIA PROVIDER; [facility] OR     ANESTHESIA CARDIOVERSION  2013    Procedure: ANESTHESIA CARDIOVERSION;  Cardioversion;  Surgeon: Provider, Generic Anesthesia;  Location: [facility] OR     ANESTHESIA  CARDIOVERSION  11/6/2013    Procedure: ANESTHESIA CARDIOVERSION;  Cardioversion;  Surgeon: Provider, Generic Anesthesia;  Location: [facility] OR     CATARACT IOL, RT/LT Left 05/20/2015     COLONOSCOPY       ENT SURGERY      tonsillectomy     HERNIORRHAPHY INGUINAL Right 7/15/2015    Procedure: HERNIORRHAPHY INGUINAL;  Surgeon: Bk Watts MD;  Location: [facility] OR     Left Atrial Wide Area Circumferential radiofrequency ablation  2/13/2014      for atrial fib     PHACOEMULSIFICATION CLEAR CORNEA WITH STANDARD INTRAOCULAR LENS IMPLANT Right 6/1/2015    Procedure: PHACOEMULSIFICATION CLEAR CORNEA WITH STANDARD INTRAOCULAR LENS IMPLANT;  Surgeon: Petr Moyer MD;  Location: [facility]       Problem list:    Patient Active Problem List    Diagnosis Date Noted     Lipoma 10/12/2021     Priority: Medium     Overview:   Epic        External hemorrhoids 10/12/2021     Priority: Medium     Overview:   Epic        Atypical nevus 10/12/2021     Priority: Medium     Hypertension 05/13/2021     Priority: Medium     Hypercholesteremia 01/21/2021     Priority: Medium     Herpes zoster      Priority: Medium     S/P ablation of atrial fibrillation 02/13/2014     Priority: Medium     Pulmonary nodules 02/01/2014     Priority: Medium     multiple noncalcified up to 6 mm needs f/u       Psoriatic arthritis (H) 06/13/2012     Priority: Medium     Esophageal reflux 03/04/2010     Priority: Medium     Benign prostatic hyperplasia 11/16/2007     Priority: Medium     Atrial fibrillation (H) 01/01/1987     Priority: Medium       Medications:  Current Outpatient Medications   Medication Sig Dispense Refill     atorvastatin (LIPITOR) 20 MG tablet Take 1 tablet (20 mg) by mouth daily 100 tablet 3     etanercept (ENBREL) 50 MG/ML injection Inject 1 mL (50 mg) Subcutaneous once a week Inject 0.98 mLs / 50mg every 10 days. Prefilled syringeHold for signs of infection, and then seek medical attention. 4 mL 5     hydrocortisone (CORTAID) 1  "% external cream Apply topically 2 times daily 120 g 4     losartan (COZAAR) 100 MG tablet Take 1 tablet (100 mg) by mouth daily 90 tablet 1     colchicine (COLCRYS) 0.6 MG tablet Take 1 tablet (0.6 mg) by mouth daily Take 0.6 mg twice daily for 7 days, then 0.6 mg daily for 16 days. (Patient not taking: Reported on 2022) 30 tablet 1       Allergies:  Allergies   Allergen Reactions     Other [No Clinical Screening - See Comments] Rash     Cardioversion pads cause rash.       Family history:  Family History   Problem Relation Age of Onset     C.A.D. Maternal Grandfather          age 63     Hypertension Maternal Grandfather      Hypertension Mother      Alzheimer Disease Mother      Prostate Cancer Father          age 60 agressive prostate ca     Alzheimer Disease Maternal Grandmother      Glaucoma No family hx of      Macular Degeneration No family hx of        Social history:  Social History     Tobacco Use     Smoking status: Former Smoker     Years: 1.00     Types: Cigarettes     Quit date: 1980     Years since quittin.4     Smokeless tobacco: Never Used   Substance Use Topics     Alcohol use: Yes     Alcohol/week: 8.3 standard drinks     Types: 10 Standard drinks or equivalent per week     Comment: occasional     Nursing Notes:   Keyla Burnett, EMT  2022  3:05 PM  Signed  Chief Complaint   Patient presents with     New Patient     External hemorrhoids       Vitals:    22 1503   BP: 124/81   BP Location: Left arm   Patient Position: Sitting   Cuff Size: Adult Regular   Pulse: 83   SpO2: 100%   Weight: 165 lb 11.2 oz   Height: 5' 10\"       Body mass index is 23.78 kg/m .     Keyla Burnett, EMT      20 minutes spent on the date of encounter (excluding time performing procedures) performing chart review, history and exam, documentation and further activities as noted above with an additional 2 minutes for anoscopy.       ARASH Saul, NP-C  Colon and Rectal " Surgery   [facility]      This note was created using speech recognition software and may contain unintended word substitutions.

## 2022-05-25 NOTE — NURSING NOTE
"Chief Complaint   Patient presents with     New Patient     External hemorrhoids       Vitals:    05/25/22 1503   BP: 124/81   BP Location: Left arm   Patient Position: Sitting   Cuff Size: Adult Regular   Pulse: 83   SpO2: 100%   Weight: 165 lb 11.2 oz   Height: 5' 10\"       Body mass index is 23.78 kg/m .                          Keyla Burnett, EMT    "

## 2022-05-25 NOTE — PATIENT INSTRUCTIONS
Start a daily fiber supplement such as Citrucel or Metamucil. Start with once a day and slowly increase up to three times a day, if needed, over the next 4-6 weeks  Return in 8 weeks if symptoms persist and can consider hemorrhoid banding  If diarrhea persists, can refer to GI

## 2022-05-25 NOTE — PROGRESS NOTES
"Colon and Rectal Surgery Consult Clinic Note    Date: 2022     Referring provider:  Brendan Ndiaye MD  [address] [place] [id number]     RE: Prince Pereira  : 1955  NENA: 2022    Prince Pereira is a very pleasant 66 year old male who presents today for hemorrhoids.    HPI:  Has had hemorrhoids that have developed over the past few years. Periodically used hydrocortisone cream. Has several days without bowel movements followed by multiple bowel movements in a day that get increasingly loose. He has to sit for a long time. Tried fiber for a while. No constipation. Hemorrhoids get very inflamed after multiple bowel movements. These leak fluid when inflamed. Only a small amount of blood tinged drainage. Colonoscopy 2018 was normal. History of atrial fibrillation but not on any blood thinner.    Physical Examination: Exam was chaperoned by Keyla Loja, EMT   /81 (BP Location: Left arm, Patient Position: Sitting, Cuff Size: Adult Regular)   Pulse 83   Ht 5' 10\"   Wt 165 lb 11.2 oz   SpO2 100%   BMI 23.78 kg/m    General: alert, oriented, in no acute distress, sitting comfortably  HEENT: mucous membranes moist  Perianal external examination:  Perianal skin: Intact with no excoriation or lichenification.  Lesions: No evidence of an external lesion, nodularity, or induration in the perianal region.  Eversion of buttocks: There was not evidence of an anal fissure. Details: N/A.  Skin tags or external hemorrhoids: Yes: prolapsing hemorrhoids in the right and left lateral positions.  Digital rectal examination: Was performed.   Sphincter tone: Good.  Palpable lesions: No.  Prostate: Abnormal: enlarged but without nodularity.  Other: None.    Anoscopy: Was performed.   Hemorrhoids: Yes. Grade 2-3 internal hemorrhoids without active bleeding  Lesions: No    Assessment/Plan: 66 year old male with hemorrhoid prolapse and diarrhea.  We discussed the importance " of regulating his bowel movements.  I recommended a daily fiber supplement and he can slowly ramp this up to 3 times a day if needed.  If this fails to resolve his diarrhea, will have him meet with gastroenterology.  We discussed options for managing his hemorrhoids including hemorrhoid banding.  However, he does not feel that the hemorrhoids bother him unless he has multiple bowel movements in a day so would prefer to try to improve his stool consistency first.  We will have him return to clinic after 6 to 8 weeks for hemorrhoid banding if hemorrhoids are still symptomatic. Patient's questions were answered to his stated satisfaction and he is in agreement with this plan.    Medical history:  Past Medical History:   Diagnosis Date     Atrial fibrillation (H)     paroxysmal with ablation 2014     Family history of prostate cancer     father  age 60     Herpes zoster 3/26/2013     Almendarez's neuroma of right foot      Psoriasis      psoriatic arthritis      Pulmonary nodules 2014    multiple noncalcified up to 6 mm needs f/u       Surgical history:  Past Surgical History:   Procedure Laterality Date     ANESTHESIA CARDIOVERSION  10/6/2011    Procedure:ANESTHESIA CARDIOVERSION; CARDIOVERSION; Surgeon:GENERIC ANESTHESIA PROVIDER; Location: OR     ANESTHESIA CARDIOVERSION  2/10/2012    Procedure:ANESTHESIA CARDIOVERSION; CARDIOVERSION; Surgeon:GENERIC ANESTHESIA PROVIDER; Location[facility] OR     ANESTHESIA CARDIOVERSION  2013    Procedure: ANESTHESIA CARDIOVERSION;  Cardioversion;  Surgeon: Provider, Generic Anesthesia;  Location: [facility] OR     ANESTHESIA CARDIOVERSION  2013    Procedure: ANESTHESIA CARDIOVERSION;  Cardioversion;  Surgeon: Provider, Generic Anesthesia;  Location:  OR     CATARACT IOL, RT/LT Left 2015     COLONOSCOPY       ENT SURGERY      tonsillectomy     HERNIORRHAPHY INGUINAL Right 7/15/2015    Procedure: HERNIORRHAPHY INGUINAL;  Surgeon: Bk Watts MD;  Location:   OR     Left Atrial Wide Area Circumferential radiofrequency ablation  2/13/2014      for atrial fib     PHACOEMULSIFICATION CLEAR CORNEA WITH STANDARD INTRAOCULAR LENS IMPLANT Right 6/1/2015    Procedure: PHACOEMULSIFICATION CLEAR CORNEA WITH STANDARD INTRAOCULAR LENS IMPLANT;  Surgeon: Petr Moyer MD;  Location: [facility]       Problem list:    Patient Active Problem List    Diagnosis Date Noted     Lipoma 10/12/2021     Priority: Medium     Overview:   Epic        External hemorrhoids 10/12/2021     Priority: Medium     Overview:   Epic        Atypical nevus 10/12/2021     Priority: Medium     Hypertension 05/13/2021     Priority: Medium     Hypercholesteremia 01/21/2021     Priority: Medium     Herpes zoster      Priority: Medium     S/P ablation of atrial fibrillation 02/13/2014     Priority: Medium     Pulmonary nodules 02/01/2014     Priority: Medium     multiple noncalcified up to 6 mm needs f/u       Psoriatic arthritis (H) 06/13/2012     Priority: Medium     Esophageal reflux 03/04/2010     Priority: Medium     Benign prostatic hyperplasia 11/16/2007     Priority: Medium     Atrial fibrillation (H) 01/01/1987     Priority: Medium       Medications:  Current Outpatient Medications   Medication Sig Dispense Refill     atorvastatin (LIPITOR) 20 MG tablet Take 1 tablet (20 mg) by mouth daily 100 tablet 3     etanercept (ENBREL) 50 MG/ML injection Inject 1 mL (50 mg) Subcutaneous once a week Inject 0.98 mLs / 50mg every 10 days. Prefilled syringeHold for signs of infection, and then seek medical attention. 4 mL 5     hydrocortisone (CORTAID) 1 % external cream Apply topically 2 times daily 120 g 4     losartan (COZAAR) 100 MG tablet Take 1 tablet (100 mg) by mouth daily 90 tablet 1     colchicine (COLCRYS) 0.6 MG tablet Take 1 tablet (0.6 mg) by mouth daily Take 0.6 mg twice daily for 7 days, then 0.6 mg daily for 16 days. (Patient not taking: Reported on 4/25/2022) 30 tablet 1       Allergies:  Allergies  "  Allergen Reactions     Other [No Clinical Screening - See Comments] Rash     Cardioversion pads cause rash.       Family history:  Family History   Problem Relation Age of Onset     C.A.D. Maternal Grandfather          age 63     Hypertension Maternal Grandfather      Hypertension Mother      Alzheimer Disease Mother      Prostate Cancer Father          age 60 agressive prostate ca     Alzheimer Disease Maternal Grandmother      Glaucoma No family hx of      Macular Degeneration No family hx of        Social history:  Social History     Tobacco Use     Smoking status: Former Smoker     Years: 1.00     Types: Cigarettes     Quit date: 1980     Years since quittin.4     Smokeless tobacco: Never Used   Substance Use Topics     Alcohol use: Yes     Alcohol/week: 8.3 standard drinks     Types: 10 Standard drinks or equivalent per week     Comment: occasional     Nursing Notes:   Keyla Burnett, EMT  2022  3:05 PM  Signed  Chief Complaint   Patient presents with     New Patient     External hemorrhoids       Vitals:    22 1503   BP: 124/81   BP Location: Left arm   Patient Position: Sitting   Cuff Size: Adult Regular   Pulse: 83   SpO2: 100%   Weight: 165 lb 11.2 oz   Height: 5' 10\"       Body mass index is 23.78 kg/m .                          Keyla Burnett, EMT         20 minutes spent on the date of encounter (excluding time performing procedures) performing chart review, history and exam, documentation and further activities as noted above with an additional 2 minutes for anoscopy.     ARASH Saul, NP-C  Colon and Rectal Surgery   [facility]    This note was created using speech recognition software and may contain unintended word substitutions.    "

## 2022-05-31 ENCOUNTER — TELEPHONE (OUTPATIENT)
Dept: RHEUMATOLOGY | Facility: CLINIC | Age: 67
End: 2022-05-31

## 2022-05-31 NOTE — TELEPHONE ENCOUNTER
Prior Authorization Approval    Authorization Effective Date: 5/31/2022  Authorization Expiration Date: 5/31/2023  Medication: Enbrel  Approved Dose/Quantity: 4ML for 28 days  Reference #: [id number]   Insurance Company: [employer] Phone [phone] Fax [phone]  Expected CoPay: $30     CoPay Card Available:      Foundation Assistance Needed:    Which Pharmacy is filling the prescription (Not needed for infusion/clinic administered): [facility] - [place], [place] - [address]  Pharmacy Notified: Yes  Patient Notified: Yes

## 2022-06-19 ENCOUNTER — HEALTH MAINTENANCE LETTER (OUTPATIENT)
Age: 67
End: 2022-06-19

## 2022-10-05 ENCOUNTER — ANCILLARY PROCEDURE (OUTPATIENT)
Dept: CARDIOLOGY | Facility: CLINIC | Age: 67
End: 2022-10-05
Attending: NURSE PRACTITIONER
Payer: COMMERCIAL

## 2022-10-05 ENCOUNTER — LAB (OUTPATIENT)
Dept: LAB | Facility: CLINIC | Age: 67
End: 2022-10-05
Payer: COMMERCIAL

## 2022-10-05 DIAGNOSIS — I48.0 PAROXYSMAL ATRIAL FIBRILLATION (H): ICD-10-CM

## 2022-10-05 DIAGNOSIS — I77.810 ASCENDING AORTA DILATION (H): ICD-10-CM

## 2022-10-05 DIAGNOSIS — I10 BENIGN ESSENTIAL HYPERTENSION: ICD-10-CM

## 2022-10-05 LAB
ALBUMIN SERPL BCG-MCNC: 4.4 G/DL (ref 3.5–5.2)
ALP SERPL-CCNC: 76 U/L (ref 40–129)
ALT SERPL W P-5'-P-CCNC: 44 U/L (ref 10–50)
ANION GAP SERPL CALCULATED.3IONS-SCNC: 9 MMOL/L (ref 7–15)
AST SERPL W P-5'-P-CCNC: 38 U/L (ref 10–50)
BILIRUB SERPL-MCNC: 0.4 MG/DL
BUN SERPL-MCNC: 10.4 MG/DL (ref 8–23)
CALCIUM SERPL-MCNC: 9.3 MG/DL (ref 8.8–10.2)
CHLORIDE SERPL-SCNC: 105 MMOL/L (ref 98–107)
CHOLEST SERPL-MCNC: 140 MG/DL
CREAT SERPL-MCNC: 0.84 MG/DL (ref 0.67–1.17)
DEPRECATED HCO3 PLAS-SCNC: 26 MMOL/L (ref 22–29)
GFR SERPL CREATININE-BSD FRML MDRD: >90 ML/MIN/1.73M2
GLUCOSE SERPL-MCNC: 110 MG/DL (ref 70–99)
HDLC SERPL-MCNC: 55 MG/DL
LDLC SERPL CALC-MCNC: 71 MG/DL
LVEF ECHO: NORMAL
NONHDLC SERPL-MCNC: 85 MG/DL
POTASSIUM SERPL-SCNC: 4.7 MMOL/L (ref 3.4–5.3)
PROT SERPL-MCNC: 7.1 G/DL (ref 6.4–8.3)
SODIUM SERPL-SCNC: 140 MMOL/L (ref 136–145)
TRIGL SERPL-MCNC: 68 MG/DL

## 2022-10-05 PROCEDURE — 93306 TTE W/DOPPLER COMPLETE: CPT | Mod: GC | Performed by: INTERNAL MEDICINE

## 2022-10-05 PROCEDURE — 80053 COMPREHEN METABOLIC PANEL: CPT | Performed by: PATHOLOGY

## 2022-10-05 PROCEDURE — 99000 SPECIMEN HANDLING OFFICE-LAB: CPT | Performed by: PATHOLOGY

## 2022-10-05 PROCEDURE — 80061 LIPID PANEL: CPT | Mod: 90 | Performed by: PATHOLOGY

## 2022-10-05 PROCEDURE — 36415 COLL VENOUS BLD VENIPUNCTURE: CPT | Performed by: PATHOLOGY

## 2022-10-13 ENCOUNTER — OFFICE VISIT (OUTPATIENT)
Dept: CARDIOLOGY | Facility: CLINIC | Age: 67
End: 2022-10-13
Attending: INTERNAL MEDICINE
Payer: COMMERCIAL

## 2022-10-13 VITALS
OXYGEN SATURATION: 98 % | HEIGHT: 71 IN | WEIGHT: 164.9 LBS | HEART RATE: 71 BPM | BODY MASS INDEX: 23.09 KG/M2 | SYSTOLIC BLOOD PRESSURE: 152 MMHG | DIASTOLIC BLOOD PRESSURE: 93 MMHG

## 2022-10-13 DIAGNOSIS — I10 BENIGN ESSENTIAL HYPERTENSION: ICD-10-CM

## 2022-10-13 DIAGNOSIS — I48.0 PAROXYSMAL ATRIAL FIBRILLATION (H): Primary | ICD-10-CM

## 2022-10-13 DIAGNOSIS — I77.810 ASCENDING AORTA DILATION (H): ICD-10-CM

## 2022-10-13 LAB
ATRIAL RATE - MUSE: 69 BPM
DIASTOLIC BLOOD PRESSURE - MUSE: NORMAL MMHG
INTERPRETATION ECG - MUSE: NORMAL
P AXIS - MUSE: 55 DEGREES
PR INTERVAL - MUSE: 170 MS
QRS DURATION - MUSE: 102 MS
QT - MUSE: 392 MS
QTC - MUSE: 420 MS
R AXIS - MUSE: 9 DEGREES
SYSTOLIC BLOOD PRESSURE - MUSE: NORMAL MMHG
T AXIS - MUSE: 11 DEGREES
VENTRICULAR RATE- MUSE: 69 BPM

## 2022-10-13 PROCEDURE — 99214 OFFICE O/P EST MOD 30 MIN: CPT | Performed by: INTERNAL MEDICINE

## 2022-10-13 PROCEDURE — 93005 ELECTROCARDIOGRAM TRACING: CPT

## 2022-10-13 PROCEDURE — G0463 HOSPITAL OUTPT CLINIC VISIT: HCPCS | Mod: 25

## 2022-10-13 ASSESSMENT — PAIN SCALES - GENERAL: PAINLEVEL: NO PAIN (0)

## 2022-10-13 NOTE — PROGRESS NOTES
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 I had a video visit with him 10/14/2021.     He underwent pulmonary vein isolation for AF on 2/13/2014 and warfarin/metoprolol were discontinued 4/2014. He's had two episodes of recurrent AF since 2014: one in setting of severe back pain lasted 4 hours May 2017; the other one on 5/23/2020 when he was on vacation, carrying fishing gear, lasted 2-3 hours. He had no further episodes of AF until 7/29/2021 - he was at work when he felt an irregular rhythm consistent with his AF, his pulse was around 100 bpm. He did not feel palpitations as much as he did previously. Episode lasted about 3 hours and terminated spontaneously.      At our last visit, we recommended anticoagulation since he is now over 65 and has hypertension. He declines at that time and wished to monitor for more atrial fibrillation.He has been seeing our cardiology NP Lizzette Flores for follow up of mildly dilated ascending aorta, losartan was increased to 100 every day in March 2022. His average BP at home is 119/79 per his Omron BP cuff. He has a Kardia monitor at home. He has been dealing with BPH and hemorrhoids, still hesitant to start anticoagulation due to hemorrhoids. He is also very active at home with various projects and is concerned about bruising/bleeding.      Past Medical History:  1. Atrial fibrillation, first episode 1987, s/p PVI 2/13/2014, prolonged hospital stay due to femoral hematoma. Multiple cardioversions prior to PVI. Recurrent 2017 and May 2020, each time 2-4 hours. Recurrent 7/29/2021, 3 hours.  2. Pulmonary nodules  3. Psoriatic arthritis  4. Herpes zoster  5. BPH  6. Hemorrhoids.        Allergies:    Allergies   Allergen Reactions     Other [No Clinical Screening - See Comments] Rash     Cardioversion pads cause rash.       Medications:   Losartan 100 qd  Atorvastatin 20 every day  Enbrel injections    Physical examination  Vitals: 152/93,  HR 71  BMI= 23    Constitutional: In general, the patient is a pleasant male in no acute distress.    Targeted cardiovascular exam:  Regular rate and rhythm. Normal S1, S2. No murmur, rub, click, or gallop.   Extremities:Pulses are normal bilaterally throughout. No peripheral edema.  Respiratory: Clear to asculation.        I have personally and independently reviewed the following:  Labs:  10/5/2022: chol 140, HDL 55, LDL 71, TG 68, cr 0.84    Echo:  10/5/2022: EF 55-60%,  Normal RV. Ascending aorta 4.2 cm. TONEY 38.6. No change from 2/14/2022.    CTA Coronary Angiogram:  4/4/2022   1.   Mild nonobstructive coronary artery disease with mild proximal right coronary artery stenosis.  2.   Dilated proximal ascending aorta measuring 4.4 cm x 4 cm. Mildly dilated aortic root measuring 3.8 cm.  3.  Total Agatston score 39.1 placing the patient in the 36 percentile when compared to age and gender matched control group.      Patch monitor:  PATCH MONITOR 8/31-9/12/2021:  Sinus average 71 bpm, range  bpm.  PVCs 2%, PACs <1%.  Patient triggered events = PACs and PVCs (accidentally - he did not intentionally trigger any specific events)  NSVT 6 beats 9/8 at 3:41 pm - no symptoms.  Runs of PACs longest 31 seconds at 122 bpm, no symptoms.  No AFib detected.       Assessment :  1. Atrial fibrillation, paroxysmal, h/o PVI 2014, with ongoing infrequent episodes, [id number] is  2 for age >65 and HTN, he meets indication for chronic anticoagulation.  Discussed risks and benefits with him. He is still considering. His AF ventricular rates are usually  bpm. He does not require metoprolol for rate control and has had fatigue when previously on metoprolol. He has always known when he's in AF and now has a [employer] monitor, will continue to follow.  2. Hypertension. High today but average BP acceptable on his home cuff. If more control is needed, hydrochlorothiazide would be a good option however with his BPH might not best  for him. Can also consider adding amlodipine.  3. Dilated ascending aorta, mild, aggressive BP control  4. Hyperlipidemia. Improved on atorvastatin.      Plan:  No changes to meds today.  He will follow up BP control with PCP and with NP Mark in 6 months        I spent a total of 20 minutes face to face with  Prince Pereira during today's office visit. I have spent an additional 10 minutes today on chart review and documentation.      The patient is to return  as above. The patient understood the treatment plan as outlined above.  There were no barriers to learning.    Vera Rodriguez MD  Cardiology

## 2022-10-13 NOTE — LETTER
10/13/2022      RE: Prince Pereira  [address]  [facility] [address]       Dear Colleague,    Thank you for the opportunity to participate in the care of your patient, Prince Pereira, at the [facility] [place] at [facility]. Please see a copy of my visit note below.          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 I had a video visit with him 10/14/2021.     He underwent pulmonary vein isolation for AF on 2/13/2014 and warfarin/metoprolol were discontinued 4/2014. He's had two episodes of recurrent AF since 2014: one in setting of severe back pain lasted 4 hours May 2017; the other one on 5/23/2020 when he was on vacation, carrying fishing gear, lasted 2-3 hours. He had no further episodes of AF until 7/29/2021 - he was at work when he felt an irregular rhythm consistent with his AF, his pulse was around 100 bpm. He did not feel palpitations as much as he did previously. Episode lasted about 3 hours and terminated spontaneously.      At our last visit, we recommended anticoagulation since he is now over 65 and has hypertension. He declines at that time and wished to monitor for more atrial fibrillation.He has been seeing our cardiology NP Lizzette Flores for follow up of mildly dilated ascending aorta, losartan was increased to 100 every day in March 2022. His average BP at home is 119/79 per his Omron BP cuff. He has a Kardia monitor at home. He has been dealing with BPH and hemorrhoids, still hesitant to start anticoagulation due to hemorrhoids. He is also very active at home with various projects and is concerned about bruising/bleeding.      Past Medical History:  1. Atrial fibrillation, first episode 1987, s/p PVI 2/13/2014, prolonged hospital stay due to femoral hematoma. Multiple cardioversions prior to PVI. Recurrent 2017 and May 2020, each time 2-4 hours. Recurrent  7/29/2021, 3 hours.  2. Pulmonary nodules  3. Psoriatic arthritis  4. Herpes zoster  5. BPH  6. Hemorrhoids.        Allergies:    Allergies   Allergen Reactions     Other [No Clinical Screening - See Comments] Rash     Cardioversion pads cause rash.       Medications:   Losartan 100 qd  Atorvastatin 20 every day  Enbrel injections    Physical examination  Vitals: 152/93, HR 71  BMI= 23    Constitutional: In general, the patient is a pleasant male in no acute distress.    Targeted cardiovascular exam:  Regular rate and rhythm. Normal S1, S2. No murmur, rub, click, or gallop.   Extremities:Pulses are normal bilaterally throughout. No peripheral edema.  Respiratory: Clear to asculation.        I have personally and independently reviewed the following:  Labs:  10/5/2022: chol 140, HDL 55, LDL 71, TG 68, cr 0.84    Echo:  10/5/2022: EF 55-60%,  Normal RV. Ascending aorta 4.2 cm. TONEY 38.6. No change from 2/14/2022.    CTA Coronary Angiogram:  4/4/2022   1.   Mild nonobstructive coronary artery disease with mild proximal right coronary artery stenosis.  2.   Dilated proximal ascending aorta measuring 4.4 cm x 4 cm. Mildly dilated aortic root measuring 3.8 cm.  3.  Total Agatston score 39.1 placing the patient in the 36 percentile when compared to age and gender matched control group.      Patch monitor:  PATCH MONITOR 8/31-9/12/2021:  Sinus average 71 bpm, range  bpm.  PVCs 2%, PACs <1%.  Patient triggered events = PACs and PVCs (accidentally - he did not intentionally trigger any specific events)  NSVT 6 beats 9/8 at 3:41 pm - no symptoms.  Runs of PACs longest 31 seconds at 122 bpm, no symptoms.  No AFib detected.       Assessment :  1. Atrial fibrillation, paroxysmal, h/o PVI 2014, with ongoing infrequent episodes, [id number] is  2 for age >65 and HTN, he meets indication for chronic anticoagulation.  Discussed risks and benefits with him. He is still considering. His AF ventricular rates are usually   bpm. He does not require metoprolol for rate control and has had fatigue when previously on metoprolol. He has always known when he's in AF and now has a Kardia monitor, will continue to follow.  2. Hypertension. High today but average BP acceptable on his home cuff. If more control is needed, hydrochlorothiazide would be a good option however with his BPH might not best for him. Can also consider adding amlodipine.  3. Dilated ascending aorta, mild, aggressive BP control  4. Hyperlipidemia. Improved on atorvastatin.      Plan:  No changes to meds today.  He will follow up BP control with PCP and with KANDI Flores in 6 months        I spent a total of 20 minutes face to face with  Prince Pereira during today's office visit. I have spent an additional 10 minutes today on chart review and documentation.      The patient is to return  as above. The patient understood the treatment plan as outlined above.  There were no barriers to learning.    Vera Rodriguez MD  Cardiology

## 2022-10-13 NOTE — PATIENT INSTRUCTIONS
You were seen in the Electrophysiology Clinic today by: Dr Vera Rodriguez    Plan:   Medication Changes:  none    Labs/Tests Needed: none    Follow up visit: 6 months with Lizzette Flores    Further Instructions: consider adding amlodipine      If you have further questions, please utilize [employer] to contact us.     Your Care Team:    [facility] Card[facility]   Telephone Number     Nurse Line  Dariana Amador RN      [phone]     For scheduling appointments:   Betsy   [phone]   For procedure scheduling:    Ekaterina Borrero     [phone]   For the Device Clinic (Pacemakers, ICDs, Loop Recorders)    During business hours: [phone]  After business hours:   [phone]- select option 4 and ask for job code [id number].       On-call cardiologist for after hours or on weekends:   [phone], option #4, and ask to speak to the on-call cardiologist.     Cardiovascular Clinic:   [address] [place] [address]. [place] [address]      As always, Thank you for trusting us with your health care needs!

## 2022-10-13 NOTE — NURSING NOTE
Chief Complaint   Patient presents with     Follow Up     1 yr f/u for PAF, not anticoagulated       Vitals were taken, medications reconciled, and EKG performed.    Andrew Sandra  7:51 AM

## 2022-10-25 DIAGNOSIS — L40.50 PSORIATIC ARTHRITIS (H): ICD-10-CM

## 2022-10-27 DIAGNOSIS — I10 HYPERTENSION, UNSPECIFIED TYPE: Primary | ICD-10-CM

## 2022-10-29 RX ORDER — ETANERCEPT 50 MG/ML
50 SOLUTION SUBCUTANEOUS
Qty: 4 ML | Refills: 3 | Status: SHIPPED | OUTPATIENT
Start: 2022-10-29 | End: 2023-03-09

## 2022-10-29 NOTE — TELEPHONE ENCOUNTER
Last Clinic Visit: 1/28/2022  [facility] [place]  Recommended 1 year follow up  No upcoming appointments  Refilled per rheumatology refill protocol with message on RX to schedule follow up

## 2022-11-01 RX ORDER — LOSARTAN POTASSIUM 100 MG/1
100 TABLET ORAL DAILY
Qty: 90 TABLET | Refills: 1 | Status: SHIPPED | OUTPATIENT
Start: 2022-11-01 | End: 2023-04-12

## 2022-11-01 NOTE — TELEPHONE ENCOUNTER
Last Clinic Visit: [facility] 10/13/22  NV: 4/10/22  ;PSARTAM 100 MG #90, 1 refill  Elevated Bp addressed at 10/13/22 clinic visit

## 2022-11-19 ENCOUNTER — HEALTH MAINTENANCE LETTER (OUTPATIENT)
Age: 67
End: 2022-11-19

## 2022-11-30 ENCOUNTER — MYC MEDICAL ADVICE (OUTPATIENT)
Dept: SURGERY | Facility: CLINIC | Age: 67
End: 2022-11-30

## 2022-12-07 ENCOUNTER — OFFICE VISIT (OUTPATIENT)
Dept: SURGERY | Facility: CLINIC | Age: 67
End: 2022-12-07
Payer: COMMERCIAL

## 2022-12-07 VITALS
HEART RATE: 93 BPM | OXYGEN SATURATION: 99 % | DIASTOLIC BLOOD PRESSURE: 86 MMHG | BODY MASS INDEX: 23.62 KG/M2 | HEIGHT: 70 IN | WEIGHT: 165 LBS | SYSTOLIC BLOOD PRESSURE: 126 MMHG

## 2022-12-07 DIAGNOSIS — K64.8 HEMORRHOID PROLAPSE: Primary | ICD-10-CM

## 2022-12-07 DIAGNOSIS — R19.7 DIARRHEA, UNSPECIFIED TYPE: ICD-10-CM

## 2022-12-07 PROCEDURE — 99212 OFFICE O/P EST SF 10 MIN: CPT | Performed by: NURSE PRACTITIONER

## 2022-12-07 ASSESSMENT — PAIN SCALES - GENERAL: PAINLEVEL: NO PAIN (0)

## 2022-12-07 NOTE — LETTER
Date:December 7, 2022      Provider requested that no letter be sent. Do not send.       [facility]

## 2022-12-07 NOTE — PROGRESS NOTES
"Colon and Rectal Surgery Follow-Up Clinic Note    RE: Prince Pereira  : 1955  NENA: 2022    Prince Pereira is a very pleasant 67 year old male here for follow-up of hemorrhoids.    Interval history: I saw Kody in May of this year with hemorrhoid prolapse and diarrhea. We discussed hemorrhoid banding at that time but he wanted to try to improve stool consistency first. He has tried a fiber supplement but has not used it more than once a day. He continues to have intermittent loose stools and multiple stools seem to make his symptoms worse.  Colonoscopy 2018 was normal. History of atrial fibrillation but not on any blood thinner.    Physical Examination: Exam was chaperoned by Damon Dominguez, EMT-P   /86 (BP Location: Left arm, Patient Position: Sitting, Cuff Size: Adult Regular)   Pulse 93   Ht 5' 10\"   Wt 165 lb   SpO2 99%   BMI 23.68 kg/m    General: alert, oriented, in no acute distress, sitting comfortably    Assessment/Plan: 67 year old male with diarrhea and hemorrhoid prolapse.  His symptoms have not significantly improved.  However, he has not consistently used the fiber supplement and never more than once a day.  He continues to have diarrhea and feels like this is what flares of his symptoms.  We discussed hemorrhoid banding again today but he would like to again try to manage his loose stools to see if this improves symptoms before considering banding.  He would like to wait until he was retired, possibly sometime next year, before considering banding.  Recommended trying fiber 2-3 times a day and use it consistently over at least 1 month to decide if it is or is not helpful.  He can return at any time if he would like to consider banding. Patient's questions were answered to his stated satisfaction and he is in agreement with this plan.     Medical history:  Past Medical History:   Diagnosis Date     Atrial fibrillation (H)     paroxysmal with ablation 2014 "     Family history of prostate cancer     father  age 60     Herpes zoster 3/26/2013     Almendarez's neuroma of right foot      Psoriasis      psoriatic arthritis      Pulmonary nodules 2014    multiple noncalcified up to 6 mm needs f/u       Surgical history:  Past Surgical History:   Procedure Laterality Date     ANESTHESIA CARDIOVERSION  10/6/2011    Procedure:ANESTHESIA CARDIOVERSION; CARDIOVERSION; Surgeon:GENERIC ANESTHESIA PROVIDER; Location:[facility]     ANESTHESIA CARDIOVERSION  2/10/2012    Procedure:ANESTHESIA CARDIOVERSION; CARDIOVERSION; Surgeon:GENERIC ANESTHESIA PROVIDER; [facility]     ANESTHESIA CARDIOVERSION  2013    Procedure: ANESTHESIA CARDIOVERSION;  Cardioversion;  Surgeon: Provider, Generic Anesthesia;  Location: [facility] OR     ANESTHESIA CARDIOVERSION  2013    Procedure: ANESTHESIA CARDIOVERSION;  Cardioversion;  Surgeon: Provider, Generic Anesthesia;  Location: [facility] OR     CATARACT IOL, RT/LT Left 2015     COLONOSCOPY       ENT SURGERY      tonsillectomy     HERNIORRHAPHY INGUINAL Right 7/15/2015    Procedure: HERNIORRHAPHY INGUINAL;  Surgeon: Bk Watts MD;  Location: [facility] OR     Left Atrial Wide Area Circumferential radiofrequency ablation  2014      for atrial fib     PHACOEMULSIFICATION CLEAR CORNEA WITH STANDARD INTRAOCULAR LENS IMPLANT Right 2015    Procedure: PHACOEMULSIFICATION CLEAR CORNEA WITH STANDARD INTRAOCULAR LENS IMPLANT;  Surgeon: Petr Moyer MD;  Location: [facility]       Problem list:  Patient Active Problem List    Diagnosis Date Noted     Lipoma 10/12/2021     Priority: Medium     Overview:   Epic        External hemorrhoids 10/12/2021     Priority: Medium     Overview:   Epic        Atypical nevus 10/12/2021     Priority: Medium     Hypertension 2021     Priority: Medium     Hypercholesteremia 2021     Priority: Medium     Herpes zoster      Priority: Medium     S/P ablation of atrial fibrillation 2014      Priority: Medium     Pulmonary nodules 2014     Priority: Medium     multiple noncalcified up to 6 mm needs f/u       Psoriatic arthritis (H) 2012     Priority: Medium     Esophageal reflux 2010     Priority: Medium     Benign prostatic hyperplasia 2007     Priority: Medium     Atrial fibrillation (H) 1987     Priority: Medium       Medications:  Current Outpatient Medications   Medication Sig Dispense Refill     atorvastatin (LIPITOR) 20 MG tablet Take 1 tablet (20 mg) by mouth daily 100 tablet 3     etanercept (ENBREL) 50 MG/ML injection Inject 1 mL (50 mg) Subcutaneous every 7 days For additional refills, please schedule a follow-up appointment at [phone] 4 mL 3     hydrocortisone (CORTAID) 1 % external cream Apply topically 2 times daily 120 g 4     losartan (COZAAR) 100 MG tablet Take 1 tablet (100 mg) by mouth daily 90 tablet 1       Allergies:  Allergies   Allergen Reactions     Other [No Clinical Screening - See Comments] Rash     Cardioversion pads cause rash.       Family history:  Family History   Problem Relation Age of Onset     C.A.D. Maternal Grandfather          age 63     Hypertension Maternal Grandfather      Hypertension Mother      Alzheimer Disease Mother      Prostate Cancer Father          age 60 agressive prostate ca     Alzheimer Disease Maternal Grandmother      Glaucoma No family hx of      Macular Degeneration No family hx of        Social history:  Social History     Tobacco Use     Smoking status: Former     Years: 1.00     Types: Cigarettes     Quit date: 1980     Years since quittin.9     Smokeless tobacco: Never   Substance Use Topics     Alcohol use: Yes     Alcohol/week: 8.3 standard drinks     Types: 10 Standard drinks or equivalent per week     Comment: occasional     Marital status: .    Nursing Notes:   Damon Dominguez, EMT  2022 11:38 AM  Signed  Chief Complaint   Patient presents with     RECHECK     Hemorrhoid  "banding       Vitals:    12/07/22 1135   BP: 126/86   BP Location: Left arm   Patient Position: Sitting   Cuff Size: Adult Regular   Pulse: 93   SpO2: 99%   Weight: 165 lb   Height: 5' 10\"       Body mass index is 23.68 kg/m .     Damon Dominguez, EMT- P         10 minutes spent on the date of encounter (excluding time performing procedures) performing chart review, history and exam, documentation and further activities as noted above.    Lorraine Meza NP-C  Colon and Rectal Surgery  [facility]    "

## 2022-12-07 NOTE — NURSING NOTE
"Chief Complaint   Patient presents with     RECHECK     Hemorrhoid banding       Vitals:    12/07/22 1135   BP: 126/86   BP Location: Left arm   Patient Position: Sitting   Cuff Size: Adult Regular   Pulse: 93   SpO2: 99%   Weight: 165 lb   Height: 5' 10\"       Body mass index is 23.68 kg/m .     Damon Dominguez, EMT- P    "

## 2022-12-07 NOTE — LETTER
"2022       RE: Prince Pereira  [address]  [facility] [address]     Dear Colleague,    Thank you for referring your patient, Prince Pereira, to the [facility] COLON [facility] RECTAL SURGERY CLINIC [place] at [facility]. Please see a copy of my visit note below.    Colon and Rectal Surgery Follow-Up Clinic Note    RE: Prince Pereira  : 1955  NENA: 2022    Prince Pereira is a very pleasant 67 year old male here for follow-up of hemorrhoids.    Interval history: I saw Kody in May of this year with hemorrhoid prolapse and diarrhea. We discussed hemorrhoid banding at that time but he wanted to try to improve stool consistency first. He has tried a fiber supplement but has not used it more than once a day. He continues to have intermittent loose stools and multiple stools seem to make his symptoms worse.  Colonoscopy 2018 was normal. History of atrial fibrillation but not on any blood thinner.    Physical Examination: Exam was chaperoned by Damon Dominguez, EMT-P   /86 (BP Location: Left arm, Patient Position: Sitting, Cuff Size: Adult Regular)   Pulse 93   Ht 5' 10\"   Wt 165 lb   SpO2 99%   BMI 23.68 kg/m    General: alert, oriented, in no acute distress, sitting comfortably    Assessment/Plan: 67 year old male with diarrhea and hemorrhoid prolapse.  His symptoms have not significantly improved.  However, he has not consistently used the fiber supplement and never more than once a day.  He continues to have diarrhea and feels like this is what flares of his symptoms.  We discussed hemorrhoid banding again today but he would like to again try to manage his loose stools to see if this improves symptoms before considering banding.  He would like to wait until he was retired, possibly sometime next year, before considering banding.  Recommended trying fiber 2-3 times a day and use it " consistently over at least 1 month to decide if it is or is not helpful.  He can return at any time if he would like to consider banding. Patient's questions were answered to his stated satisfaction and he is in agreement with this plan.     Medical history:  Past Medical History:   Diagnosis Date     Atrial fibrillation (H)     paroxysmal with ablation 2014     Family history of prostate cancer     father  age 60     Herpes zoster 3/26/2013     Almendarez's neuroma of right foot      Psoriasis      psoriatic arthritis      Pulmonary nodules 2014    multiple noncalcified up to 6 mm needs f/u       Surgical history:  Past Surgical History:   Procedure Laterality Date     ANESTHESIA CARDIOVERSION  10/6/2011    Procedure:ANESTHESIA CARDIOVERSION; CARDIOVERSION; Surgeon:GENERIC ANESTHESIA PROVIDER; [facility]     ANESTHESIA CARDIOVERSION  2/10/2012    Procedure:ANESTHESIA CARDIOVERSION; CARDIOVERSION; Surgeon:GENERIC ANESTHESIA PROVIDER; [facility]     ANESTHESIA CARDIOVERSION  2013    Procedure: ANESTHESIA CARDIOVERSION;  Cardioversion;  Surgeon: Provider, Generic Anesthesia;  Location: [facility] OR     ANESTHESIA CARDIOVERSION  2013    Procedure: ANESTHESIA CARDIOVERSION;  Cardioversion;  Surgeon: Provider, Generic Anesthesia;  Location: [facility] OR     CATARACT IOL, RT/LT Left 2015     COLONOSCOPY       ENT SURGERY      tonsillectomy     HERNIORRHAPHY INGUINAL Right 7/15/2015    Procedure: HERNIORRHAPHY INGUINAL;  Surgeon: Bk Watts MD;  Location: [facility]     Left Atrial Wide Area Circumferential radiofrequency ablation  2014      for atrial fib     PHACOEMULSIFICATION CLEAR CORNEA WITH STANDARD INTRAOCULAR LENS IMPLANT Right 2015    Procedure: PHACOEMULSIFICATION CLEAR CORNEA WITH STANDARD INTRAOCULAR LENS IMPLANT;  Surgeon: Petr Moyer MD;  Location: [facility]       Problem list:  Patient Active Problem List    Diagnosis Date Noted     Lipoma 10/12/2021     Priority:  Medium     Overview:   Epic        External hemorrhoids 10/12/2021     Priority: Medium     Overview:   Epic        Atypical nevus 10/12/2021     Priority: Medium     Hypertension 2021     Priority: Medium     Hypercholesteremia 2021     Priority: Medium     Herpes zoster      Priority: Medium     S/P ablation of atrial fibrillation 2014     Priority: Medium     Pulmonary nodules 2014     Priority: Medium     multiple noncalcified up to 6 mm needs f/u       Psoriatic arthritis (H) 2012     Priority: Medium     Esophageal reflux 2010     Priority: Medium     Benign prostatic hyperplasia 2007     Priority: Medium     Atrial fibrillation (H) 1987     Priority: Medium       Medications:  Current Outpatient Medications   Medication Sig Dispense Refill     atorvastatin (LIPITOR) 20 MG tablet Take 1 tablet (20 mg) by mouth daily 100 tablet 3     etanercept (ENBREL) 50 MG/ML injection Inject 1 mL (50 mg) Subcutaneous every 7 days For additional refills, please schedule a follow-up appointment at [phone] 4 mL 3     hydrocortisone (CORTAID) 1 % external cream Apply topically 2 times daily 120 g 4     losartan (COZAAR) 100 MG tablet Take 1 tablet (100 mg) by mouth daily 90 tablet 1       Allergies:  Allergies   Allergen Reactions     Other [No Clinical Screening - See Comments] Rash     Cardioversion pads cause rash.       Family history:  Family History   Problem Relation Age of Onset     C.A.D. Maternal Grandfather          age 63     Hypertension Maternal Grandfather      Hypertension Mother      Alzheimer Disease Mother      Prostate Cancer Father          age 60 agressive prostate ca     Alzheimer Disease Maternal Grandmother      Glaucoma No family hx of      Macular Degeneration No family hx of        Social history:  Social History     Tobacco Use     Smoking status: Former     Years: 1.00     Types: Cigarettes     Quit date: 1980     Years since  "quittin.9     Smokeless tobacco: Never   Substance Use Topics     Alcohol use: Yes     Alcohol/week: 8.3 standard drinks     Types: 10 Standard drinks or equivalent per week     Comment: occasional     Marital status: .    Nursing Notes:   Damon Dominguez, EMT  2022 11:38 AM  Signed  Chief Complaint   Patient presents with     RECHECK     Hemorrhoid banding       Vitals:    22 1135   BP: 126/86   BP Location: Left arm   Patient Position: Sitting   Cuff Size: Adult Regular   Pulse: 93   SpO2: 99%   Weight: 165 lb   Height: 5' 10\"       Body mass index is 23.68 kg/m .     Damon Dominguez, EMT- P         10 minutes spent on the date of encounter (excluding time performing procedures) performing chart review, history and exam, documentation and further activities as noted above.    Lorraine Herzog NP-C  Colon and Rectal Surgery  [facility]        Again, thank you for allowing me to participate in the care of your patient.      Sincerely,    Lorraine Herzog, APRN CNP      "

## 2022-12-27 ENCOUNTER — VIRTUAL VISIT (OUTPATIENT)
Dept: FAMILY MEDICINE | Facility: CLINIC | Age: 67
End: 2022-12-27
Payer: COMMERCIAL

## 2022-12-27 DIAGNOSIS — U07.1 INFECTION DUE TO 2019 NOVEL CORONAVIRUS: Primary | ICD-10-CM

## 2022-12-27 PROCEDURE — 99213 OFFICE O/P EST LOW 20 MIN: CPT | Mod: CS | Performed by: PHYSICIAN ASSISTANT

## 2022-12-27 NOTE — PROGRESS NOTES
Kody is a 67 year old who is being evaluated via a billable telephone visit.      What phone number would you like to be contacted at? Cell phone  How would you like to obtain your AVS? Peyton  Distant Location (provider location):  On-site    Assessment & Plan     Infection due to 2019 novel coronavirus  Patient is a 67 YOM who presents to clinic due to symptoms of COVID-19 starting 0 day(s) ago with subsequent positive test results.  Patient is able to speak in full sentences-no signs of respiratory distress. Per CDC criteria, patient qualifies for prescribed treatment of COVID19 as patient meets high risk criteria. Discussed treatment options for COVID-19 as well as risks and benefits.  Patient elects to proceed with Paxlovid.  Discussed home medications and possible interactions.  Also discussed OTC options for managing symptoms of COVID-19.      Patient to discuss weekly Enbrel dosing with rheumatologist as he may want to skip this medication while sick with COVID-19.  Patient is agreeable to sending message.     Return and urgent/emergent follow-up precautions provided.    - nirmatrelvir and ritonavir (PAXLOVID) therapy pack; Take 3 tablets by mouth 2 times daily for 5 days (Take 2 Nirmatrelvir tablets and 1 Ritonavir tablet twice daily for 5 days)       COVID-19 positive patient.  Encounter for consideration of medication intervention. Patient does qualify for a prescription. Full discussion with patient including medication options, risks and benefits. Potential drug interactions reviewed with patient.     Treatment Planned Paxlovid, Rx sent to [facility] [phone]  [address] [place] [address]    Hours:  Monday - Friday: 8am - 6pm  Saturday - Sunday: 8am - 4pm    [facility] Delivery: Patient to call [phone] to set up  in the cul de sac of the professional building  Temporary change to home medications: While taking Paxlovid, it is important that you do  "not take atorvastatin/Lipitor.  You can restart this medication 4 days after completing Paxlovid.     Estimated body mass index is 23.68 kg/m  as calculated from the following:    Height as of 12/7/22: 1.778 m (5' 10\").    Weight as of 12/7/22: 74.8 kg (165 lb).  GFR Estimate   Date Value Ref Range Status   10/05/2022 >90 >60 mL/min/1.73m2 Final     Comment:     Effective December 21, 2021 eGFRcr in adults is calculated using the 2021 CKD-EPI creatinine equation which includes age and gender (Brandee et al., NEJM, DOI: 10.1056/NYYDrr2777488)   07/01/2021 69 >60 mL/min/[1.73_m2] Final     Comment:     Non  GFR Calc  Starting 12/18/2018, serum creatinine based estimated GFR (eGFR) will be   calculated using the Chronic Kidney Disease Epidemiology Collaboration   (CKD-EPI) equation.       No results found for: [id number]    Return in about 1 week (around 1/3/2023), or if symptoms worsen or fail to improve.    Flor Raya PA-C  [facility] Gates is a 67 year old, presenting for the following health issues:  Covid Concern      HPI       COVID-19 Symptom Review  How many days ago did these symptoms start? No symptoms. Tested positive today.     Are any of the following symptoms significant for you?    New or worsening difficulty breathing? No    Worsening cough? No    Fever or chills? No    Headache: Yes    Sore throat: No    Chest pain: No    Diarrhea: No    Body aches? No    What treatments has patient tried? none   Does patient live in a nursing home, group home, or shelter? No  Does patient have a way to get food/medications during quarantined? Yes, I have a friend or family member who can help me.        Objective           Vitals:  No vitals were obtained today due to virtual visit.    Physical Exam   healthy, alert and no distress  PSYCH: Alert and oriented times 3; coherent speech, normal   rate and volume, able to articulate logical thoughts, able   to " abstract reason, no tangential thoughts, no hallucinations   or delusions  His affect is normal  RESP: No cough, no audible wheezing, able to talk in full sentences  Remainder of exam unable to be completed due to telephone visits            Phone call duration: 6 minutes

## 2022-12-27 NOTE — PATIENT INSTRUCTIONS
Chadwick Gates,      Based on your health history you do qualify for treatment of COVID-19.  For treatment you have been prescribed Paxlovid.  Paxlovid is a combined antiviral medication that reduces risk of hospitalization or severe COVID by 90%.  It is important that you  this medication and start it right away today.  The medication was sent to the [facility] pharmacy.  Please see below for pharmacy information.    While taking Paxlovid, it is important that you do not take atorvastatin/Lipitor.  You can restart this medication 4 days after completing Paxlovid.     You may continue to use Tylenol/ibuprofen for fevers, headache, body ache.  You can also use over-the-counter decongestants and cough suppressants to help manage symptoms.    Do not forget to message or call your rheumatologist regarding possibly skipping your Enbrel dosing this week +/- next week as you are sick with COVID-19.    If you develop any severe symptoms of medication reaction or COVID-19 including chest pain, coughing up blood, shortness of breath, swelling, rashes, or any other severe symptoms, please call 911/go to the emergency department.    Please reach out with any questions or concerns.  Take Care,  Flor Raya PA-C    Treatment Planned Paxlovid, Rx sent to [facility] [phone]  [address] [place] [address]    Hours:  Monday - Friday: 8am - 6pm  Saturday - Sunday: 8am - 4pm    [facility] Delivery: Patient to call [phone] to set up  in the cul de sac of the professional building  COVID-19 Outpatient Treatments  Your care team can help you find the best treatments for COVID-19. Talk to a health care provider or refer to the FDA medicine fact sheets below.  Important: You can't have Paxlovid or molnupiravir if you're starting the medicine more than 5 days after your symptoms have started.  Paxlovid: https://www.fda.gov/media/480236/download  Molnupiravir (Lagevrio):  https://www.fda.gov/media/581768/download  Paxlovid (nimatrelvir and ritonavir)  How it works  Two medicines (nirmatrelvir and ritonavir) are taken together. They stop the virus from growing. Less amount of virus is easier for your body to fight.  Benefits  Lowers risk of a hospital stay or death from COVID-19.  How to take  Medicine comes in a daily container with both medicine tablets. Take by mouth twice daily (once in the morning, once at night) for 5 days.  The number of tablets to take varies by patient.  Don't chew or break capsules. Swallow whole.  When to take  Take as soon as possible after positive COVID-19 test result, and within 5 days of your first symptoms.  Who can take it  Patients must be 12 years or older, weigh at least 88 pounds, and have tested positive for COVID-19. Paxlovid is the preferred treatment for pregnant patients.  Possible side effects  Can cause altered sense of taste, diarrhea (loose, watery stools), high blood pressure, muscle aches.  Medicine conflicts  Some medicines may conflict with Paxlovid and may cause serious side effects.  Tell your care team about all the medicines you take, including prescription and over-the-counter medicines, vitamins, and herbal supplements.  Your care team will review your medicines to make sure that you can safely take Paxlovid.  Cautions  Paxlovid is not advised for patients with severe kidney or liver disease. If you have kidney or liver problems, the dose may need to be changed.  If you're pregnant or breastfeeding, talk to your care team about your options.  If you take hormonal birth control (such as the Pill), then you or your partner should also use a non-hormonal form of birth control (such as a condom). Keep doing this for 1 menstrual cycle after your last dose of Paxlovid.  Molnupiravir (Lagevrio)  How it works  Stops the virus from growing. Less amount of virus is easier for your body to fight.  Benefits  Lowers risk of a hospital stay  or death from COVID-19.  How to take  Take 4 capsules by mouth every 12 hours (4 in the morning and 4 at night) for 5 days. Don't chew or break capsules. Swallow whole.  When to take  Take as soon as possible after positive COVID-19 test result, and within 5 days of your first symptoms.  Who can take it  Patients must be 18 years or older and have tested positive for COVID-19.  Possible side effects  Diarrhea (loose, watery stools), nausea (feeling sick to your stomach), dizziness, headaches.  Medicine conflicts  Right now, there are no known conflicts with other drugs. But tell your care team about all medicines you take.  Cautions  This medicine is not advised for patients who are pregnant.  If you are someone who could become pregnant, use trusted birth control until 4 days after your last dose of molnupiravir.  If your partner could become pregnant, you should use trusted birth control until 3 months after your last dose of molnupiravir.  For informational purposes only. Not to replace the advice of your health care provider. Copyright   2022 [facility]. All rights reserved. Clinically reviewed by Chanell Mcnair, PharmD, BCACP. MemfoACT [id number] - REV 12/22.    Instructions for Patients      What are the symptoms of COVID-19?  Symptoms can include fever, cough, shortness of breath, chills, headache, muscle pain sore throat, fatigue, runny or stuffy nose, and loss of taste and smell. Other less common symptoms include nausea, vomiting, or diarrhea (watery stools).    Know when to call 911. Emergency warning signs include:  Trouble breathing or shortness of breath  Pain or pressure in the chest that doesn't go away  Feeling confused like you haven't felt before, or not being able to wake up  Bluish-colored lips or face    How can I take care of myself?  Get lots of rest. Drink extra fluids (unless a doctor has told you not to).  Take Tylenol (acetaminophen) for fever or pain. If you have liver or  kidney problems, ask your family doctor if it's okay to take Tylenol   Adults:   650 mg (two 325 mg pills or tablets) every 4 to 6 hours, or...   1,000 mg (two 500 mg pills or tablets) every 8 hours as needed.  Note: Don't take more than 3,000 mg in one day. Acetaminophen is found in many medicines (both prescribed and over the counter). Read all labels to be sure you don't take too much.  For children, check the Tylenol bottle for the right dose. The dose is based on the child's age or weight.  Take over the counter medicines for your symptoms as needed. Talk to your pharmacist.  If you have other health problems (like cancer, heart failure, an organ transplant, or severe kidney disease): Call your specialty clinic if you don't feel better in the next 2 days.    Where can I get more information?  [facility] COVID-19 Resource Hub: [email or web address]   CDC Quarantine & Isolation: https://www.cdc.gov/coronavirus/2019-ncov/your-health/quarantine-isolation.html   CDC - What to Do If You're Sick: https://www.cdc.gov/coronavirus/2019-ncov/if-you-are-sick/index.html

## 2023-03-08 DIAGNOSIS — L40.50 PSORIATIC ARTHRITIS (H): ICD-10-CM

## 2023-03-09 RX ORDER — ETANERCEPT 50 MG/ML
50 SOLUTION SUBCUTANEOUS
Qty: 12 ML | Refills: 2 | Status: SHIPPED | OUTPATIENT
Start: 2023-03-09 | End: 2023-05-25

## 2023-03-09 NOTE — TELEPHONE ENCOUNTER
Medication/Dose: etanercept (ENBREL) 50 MG/ML injection    Last Written : 10/29/22  Last Quantity: 4 mL, # refills: 3  Last Office Visit :  1/28/22  Pending appointment: 8/17/23     Prescription did not meet protocol, and routed to provider    KAY Jara, RN  [facility] Refill Team

## 2023-03-13 DIAGNOSIS — E78.5 HYPERLIPIDEMIA LDL GOAL <70: ICD-10-CM

## 2023-03-16 RX ORDER — ATORVASTATIN CALCIUM 20 MG/1
20 TABLET, FILM COATED ORAL DAILY
Qty: 90 TABLET | Refills: 0 | Status: SHIPPED | OUTPATIENT
Start: 2023-03-16 | End: 2023-04-12

## 2023-03-16 NOTE — TELEPHONE ENCOUNTER
atorvastatin 20 mg tablet      Last Written Prescription Date:  2/9/22  Last Fill Quantity: 100,   # refills: 3  Last Office Visit : 2/9/22  Future Office visit:  4/12/23    Routing refill request to provider for review/approval because:  Overdue for office visit

## 2023-04-08 NOTE — PROGRESS NOTES
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     Patient follows with Dr. Vera Rodriguez for a-fib management. He underwent pulmonary vein isolation for AF on 2/13/2014 and warfarin/metoprolol were discontinued 4/2014. He's had two episodes of recurrent AF since 2014: one in setting of severe back pain lasted 4 hours May 2017; the other one on 5/23/2020 when he was on vacation, carrying fishing gear, lasted 2-3 hours. He had no further episodes of AF until 7/29/2021 - he was at work when he felt an irregular rhythm consistent with his AF, his pulse was around 100 bpm. He did not feel palpitations as much as he did previously. Episode lasted about 3 hours and terminated spontaneously.     Evaluated by Dr. Vera Rodriguez 10/2021, anticoagulation was discussed, he wore a 14 day monitor which showed no recurrent a-fib; therefore, patient deferred anticoagulation. At his visit he was noted to be hypertensive, his metoprolol was discontinued and he was started on losartan. ECHO February 2022 shows normal LV function.     He was seen by me in March 2022 for atypical chest pain, underwent coronary CTA which showed mild nonobstructive CAD and dilated ascending aorta 4.4 cm He was noted to be hypertensive and his losartan was increased 100 mg daily. Chest pain thought likely costocondritis.     Last evaluated by Dr. Rodriguez 10/2022 and reported feeling well. Again discussed anticoagulation which patient declined.     Since his last visit he reports feeling well. 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 He is otherwise active. He does some occasional snow shoeing in the winter and enjoys tennis in the summer months. He used to enjoy long distance running but is cautious now because of his history of atrial fibrillation.  He denies any chest pain, shortness of breath, orthopnea, PND, leg swelling, weight gain, palpitations, lightheadedness or syncope. He reports occasional PVCs.    He checks his rhythm using Kariomobile when he notices skipped beats - a couple of times it was noted to be possible atrial fibrillation or unable to read, when he checked again it was NSR.     Home blood pressure has been average 128/77.     He continues to be disinterested in AC - worried about risk of bleeding, he does a lot of indoor/outdoor projects including using a chainsaw. Questions risk of thrombotic event vs risk of hemorrhage while on AC.       PAST MEDICAL HISTORY:  1. Atrial fibrillation, first episode , s/p PVI 2014, prolonged hospital stay due to femoral hematoma. Multiple cardioversions prior to PVI. Recurrent  and May 2020, each time 2-4 hours. Recurrent 2021, 3 hours.  2. Pulmonary nodules  3. Psoriatic arthritis  4. Herpes zoster  5. Hypertension  6. Hyperlipidemia   7. Dilated ascending aorta 4.4 cm 3/2022    CURRENT MEDICATIONS:  Atorvastatin 20 every day  Losartan 100 mg   Enbrel injections    ALLERGIES:     Allergies   Allergen Reactions     Other [No Clinical Screening - See Comments] Rash     Cardioversion pads cause rash.       FAMILY HISTORY:  Family History   Problem Relation Age of Onset     C.A.D. Maternal Grandfather          age 63     Hypertension Maternal Grandfather      Hypertension Mother      Alzheimer Disease Mother      Prostate Cancer Father          age 60 agressive prostate ca     Alzheimer Disease Maternal Grandmother      Glaucoma No family hx of      Macular Degeneration No family hx of        SOCIAL HISTORY:  Social History     Tobacco Use     Smoking status: Former     Years: 1.00     Types: Cigarettes     Quit date: 1980     Years since quittin.2     Smokeless tobacco: Never   Vaping Use     Vaping status: Never Used   Substance Use Topics     Alcohol use: Yes      Alcohol/week: 8.3 standard drinks of alcohol     Types: 10 Standard drinks or equivalent per week     Comment: occasional     Drug use: No       ROS:  10 point ROS neg other than the symptoms noted above in the HPI.    I have personally reviewed the following:          Coronary CTA 3/2022:                                                           IMPRESSION:  1.   Mild nonobstructive coronary artery disease with mild proximal  right coronary artery stenosis.  2.   Dilated proximal ascending aorta measuring 4.4 cm x 4 cm. Mildly  dilated aortic root measuring 3.8 cm.  3.  Total Agatston score 39.1 placing the patient in the 36 percentile  when compared to age and gender matched control group.  4.  Please review Radiology report for incidental noncardiac findings  that will follow separately.     FINDINGS:     CORONARY CALCIUM SCORE     Total Agatston calcium score: 39.1   Left main: 0  Left anterior descendin  Left circumflex: 0  Right coronary artery: 39.1   This places the patient in the 36 percentile when compared to age and  gender matched control group.     CORONARY ANGIOGRAPHY     DOMINANCE: Right dominant system.   Normal coronary origins and course.     LEFT MAIN:   The left main arises normally from the left coronary cusp and is  widely patent without any detectable stenosis.      LEFT ANTERIOR DESCENDING:   The left anterior descending and its major diagonal branches are  widely patent without any detectable stenosis. The distal left  anterior descending artery wraps around the apex. First diagonal is a  small sized artery and the second diagonal is a moderate size artery.  The diagonal branches appear patent in the proximal and midsegments,  the very distal segments are not well visualized due to poor  opacification.     RAMUS INTERMEDIUS  It is a large size vessel which appears patent without detectable  stenosis.     LEFT CIRCUMFLEX:   The left circumflex is a large size vessel which appears  "patent  without detectable stenosis        RIGHT CORONARY ARTERY:   Proximal right coronary artery has mild stenosis due to calcified  plaque (25-49%). The distal right coronary artery gives rise to right  posterior descending artery and right posterolateral artery. The  proximal segments of the right posterior descending artery and right  posterolateral artery appear patent, the rest of the segments are not  well-visualized due to poor opacification.        ADDITIONAL FINDINGS:      Proximal ascending aorta dilated measuring 4.4 cm x 4 cm. Aortic root  is dilated measuring 3.8 cm..   Normal pulmonary venous anatomy with all four pulmonary veins draining  into the left atrium.    There is no left ventricular mass or thrombus.   Normal pericardial thickness. There is no pericardial effusion.  Please review Radiology report for incidental noncardiac findings that  will follow separately.       EKG: 3/28/22: sinus 72 bpm, normal intervals, no ischemia or infarction     ECHOCARDIOGRAM 2/2022:  Normal biventricular function without WMA, no valvular disease, mild aortic dilation 4.2 cm    PATCH MONITOR 8/31-9/12/2021:  Sinus average 71 bpm, range  bpm.  PVCs 2%, PACs <1%.  Patient triggered events = PACs and PVCs (accidentally - he did not intentionally trigger any specific events)  NSVT 6 beats 9/8 at 3:41 pm - no symptoms.  Runs of PACs longest 31 seconds at 122 bpm, no symptoms.  No AFib detected.     Exam:  /86 (BP Location: Right arm, Patient Position: Sitting, Cuff Size: Adult Regular)   Pulse 69   Ht 1.801 m (5' 10.91\")   Wt 74.9 kg (165 lb 3.2 oz)   SpO2 100%   BMI 23.10 kg/m    CONSITUTIONAL: no acute distress  HEENT: no icterus, no redness or discharge, neck supple  CV: no visible edema of visualized extremities. No JVD.   RESPIRATORY: respirations nonlabored, no cough  NEURO: AA&Ox3, speech fluent/appropriate, motor grossly nonfocal  PSYCH: cooperative, affect appropriate  DERM: no rashes on " visualized face/neck/upper extremities    Assessment and Plan:   This is a 67-year-old male with a past medical history of paroxysmal a-fib s/p ablation, hypertension, hyperlipidemia, mild CAD and ascending aortic aneurysm 4.4 cm (Coronary CT 4/2022) who presents for follow-up.     - BP well controlled, continue current regimen w/losartan 100 mg daily   - Lipids well controlled, continue atorvastatin 20 mg daily  - ECHO shows stable dilation of ascending aorta (4.2 cm) 10/2022  - Recommend AC for a-fib -  he isn't convinced he is having recurrent AF and therefore declines - would consider AC if identified on cardiac monitor  - Ordered 30 day cardionet to eval for recurrent AF - patient would like to check with insurance company prior to wearing   - Discussed possibility of Watchman if he remains hesitant to start anticoagulation, he would like to consider   - RTC 6 months with echo prior     Lizzette ARASH Flores, CNP  Structural Heart Nurse Practitioner  [facility]  Clinic: [phone]  Pager: [phone]      A total of 30 minutes spent face to face with patient and > 50% of the time spent counseling and coordinating care.

## 2023-04-10 ENCOUNTER — OFFICE VISIT (OUTPATIENT)
Dept: CARDIOLOGY | Facility: CLINIC | Age: 68
End: 2023-04-10
Attending: NURSE PRACTITIONER
Payer: COMMERCIAL

## 2023-04-10 VITALS
HEART RATE: 69 BPM | SYSTOLIC BLOOD PRESSURE: 137 MMHG | HEIGHT: 71 IN | WEIGHT: 165.2 LBS | DIASTOLIC BLOOD PRESSURE: 86 MMHG | BODY MASS INDEX: 23.13 KG/M2 | OXYGEN SATURATION: 100 %

## 2023-04-10 DIAGNOSIS — I77.810 ASCENDING AORTA DILATION (H): ICD-10-CM

## 2023-04-10 DIAGNOSIS — I10 BENIGN ESSENTIAL HYPERTENSION: ICD-10-CM

## 2023-04-10 DIAGNOSIS — I48.0 PAROXYSMAL ATRIAL FIBRILLATION (H): Primary | ICD-10-CM

## 2023-04-10 PROCEDURE — 99214 OFFICE O/P EST MOD 30 MIN: CPT | Performed by: NURSE PRACTITIONER

## 2023-04-10 PROCEDURE — G0463 HOSPITAL OUTPT CLINIC VISIT: HCPCS | Performed by: NURSE PRACTITIONER

## 2023-04-10 ASSESSMENT — PAIN SCALES - GENERAL: PAINLEVEL: NO PAIN (0)

## 2023-04-10 NOTE — NURSING NOTE
Chief Complaint   Patient presents with     Follow Up     Return cardiology           Vitals were taken and medications reconciled.     Maged Reich, EMT   7:52 AM

## 2023-04-10 NOTE — LETTER
4/10/2023      RE: Prince A Augie Pereira  [address]  [facility] [address]       Dear Colleague,    Thank you for the opportunity to participate in the care of your patient, Prince Pereira, at the [facility] [place] [facility]. Please see a copy of my visit note below.    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     Patient follows with Dr. Vera Rodriguez for a-fib management. He underwent pulmonary vein isolation for AF on 2/13/2014 and warfarin/metoprolol were discontinued 4/2014. He's had two episodes of recurrent AF since 2014: one in setting of severe back pain lasted 4 hours May 2017; the other one on 5/23/2020 when he was on vacation, carrying fishing gear, lasted 2-3 hours. He had no further episodes of AF until 7/29/2021 - he was at work when he felt an irregular rhythm consistent with his AF, his pulse was around 100 bpm. He did not feel palpitations as much as he did previously. Episode lasted about 3 hours and terminated spontaneously.     Evaluated by Dr. Vera Rodriguez 10/2021, anticoagulation was discussed, he wore a 14 day monitor which showed no recurrent a-fib; therefore, patient deferred anticoagulation. At his visit he was noted to be hypertensive, his metoprolol was discontinued and he was started on losartan. ECHO February 2022 shows normal LV function.     He was seen by me in March 2022 for atypical chest pain, underwent coronary CTA which showed mild nonobstructive CAD and dilated ascending aorta 4.4 cm He was noted to be hypertensive and his losartan was increased 100 mg daily. Chest pain thought likely costocondritis.     Last evaluated by Dr. Rodriguez 10/2022 and reported feeling well. Again  discussed anticoagulation which patient declined.     Since his last visit he reports feeling well. 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 He is otherwise active. He does some occasional snow shoeing in the winter and enjoys tennis in the summer months. He used to enjoy long distance running but is cautious now because of his history of atrial fibrillation. He denies any chest pain, shortness of breath, orthopnea, PND, leg swelling, weight gain, palpitations, lightheadedness or syncope. He reports occasional PVCs.    He checks his rhythm using Beautylishbile when he notices skipped beats - a couple of times it was noted to be possible atrial fibrillation or unable to read, when he checked again it was NSR.     Home blood pressure has been average 128/77.     He continues to be disinterested in AC - worried about risk of bleeding, he does a lot of indoor/outdoor projects including using a chainsaw. Questions risk of thrombotic event vs risk of hemorrhage while on AC.       PAST MEDICAL HISTORY:  1. Atrial fibrillation, first episode , s/p PVI 2014, prolonged hospital stay due to femoral hematoma. Multiple cardioversions prior to PVI. Recurrent  and May 2020, each time 2-4 hours. Recurrent 2021, 3 hours.  2. Pulmonary nodules  3. Psoriatic arthritis  4. Herpes zoster  5. Hypertension  6. Hyperlipidemia   7. Dilated ascending aorta 4.4 cm 3/2022    CURRENT MEDICATIONS:  Atorvastatin 20 every day  Losartan 100 mg   Enbrel injections    ALLERGIES:     Allergies   Allergen Reactions     Other [No Clinical Screening - See Comments] Rash     Cardioversion pads cause rash.       FAMILY HISTORY:  Family History   Problem Relation Age of Onset     C.A.D. Maternal Grandfather          age 63     Hypertension Maternal Grandfather      Hypertension Mother      Alzheimer Disease Mother      Prostate Cancer Father          age 60 agressive prostate ca     Alzheimer Disease Maternal Grandmother       Glaucoma No family hx of      Macular Degeneration No family hx of        SOCIAL HISTORY:  Social History     Tobacco Use     Smoking status: Former     Years: 1.00     Types: Cigarettes     Quit date: 1980     Years since quittin.2     Smokeless tobacco: Never   Vaping Use     Vaping status: Never Used   Substance Use Topics     Alcohol use: Yes     Alcohol/week: 8.3 standard drinks of alcohol     Types: 10 Standard drinks or equivalent per week     Comment: occasional     Drug use: No       ROS:  10 point ROS neg other than the symptoms noted above in the HPI.    I have personally reviewed the following:          Coronary CTA 3/2022:                                                           IMPRESSION:  1.   Mild nonobstructive coronary artery disease with mild proximal  right coronary artery stenosis.  2.   Dilated proximal ascending aorta measuring 4.4 cm x 4 cm. Mildly  dilated aortic root measuring 3.8 cm.  3.  Total Agatston score 39.1 placing the patient in the 36 percentile  when compared to age and gender matched control group.  4.  Please review Radiology report for incidental noncardiac findings  that will follow separately.     FINDINGS:     CORONARY CALCIUM SCORE     Total Agatston calcium score: 39.1   Left main: 0  Left anterior descendin  Left circumflex: 0  Right coronary artery: 39.1   This places the patient in the 36 percentile when compared to age and  gender matched control group.     CORONARY ANGIOGRAPHY     DOMINANCE: Right dominant system.   Normal coronary origins and course.     LEFT MAIN:   The left main arises normally from the left coronary cusp and is  widely patent without any detectable stenosis.      LEFT ANTERIOR DESCENDING:   The left anterior descending and its major diagonal branches are  widely patent without any detectable stenosis. The distal left  anterior descending artery wraps around the apex. First diagonal is a  small sized artery and the second diagonal  "is a moderate size artery.  The diagonal branches appear patent in the proximal and midsegments,  the very distal segments are not well visualized due to poor  opacification.     RAMUS INTERMEDIUS  It is a large size vessel which appears patent without detectable  stenosis.     LEFT CIRCUMFLEX:   The left circumflex is a large size vessel which appears patent  without detectable stenosis        RIGHT CORONARY ARTERY:   Proximal right coronary artery has mild stenosis due to calcified  plaque (25-49%). The distal right coronary artery gives rise to right  posterior descending artery and right posterolateral artery. The  proximal segments of the right posterior descending artery and right  posterolateral artery appear patent, the rest of the segments are not  well-visualized due to poor opacification.        ADDITIONAL FINDINGS:      Proximal ascending aorta dilated measuring 4.4 cm x 4 cm. Aortic root  is dilated measuring 3.8 cm..   Normal pulmonary venous anatomy with all four pulmonary veins draining  into the left atrium.    There is no left ventricular mass or thrombus.   Normal pericardial thickness. There is no pericardial effusion.  Please review Radiology report for incidental noncardiac findings that  will follow separately.       EKG: 3/28/22: sinus 72 bpm, normal intervals, no ischemia or infarction     ECHOCARDIOGRAM 2/2022:  Normal biventricular function without WMA, no valvular disease, mild aortic dilation 4.2 cm    PATCH MONITOR 8/31-9/12/2021:  Sinus average 71 bpm, range  bpm.  PVCs 2%, PACs <1%.  Patient triggered events = PACs and PVCs (accidentally - he did not intentionally trigger any specific events)  NSVT 6 beats 9/8 at 3:41 pm - no symptoms.  Runs of PACs longest 31 seconds at 122 bpm, no symptoms.  No AFib detected.     Exam:  /86 (BP Location: Right arm, Patient Position: Sitting, Cuff Size: Adult Regular)   Pulse 69   Ht 1.801 m (5' 10.91\")   Wt 74.9 kg (165 lb 3.2 oz)   " SpO2 100%   BMI 23.10 kg/m    CONSITUTIONAL: no acute distress  HEENT: no icterus, no redness or discharge, neck supple  CV: no visible edema of visualized extremities. No JVD.   RESPIRATORY: respirations nonlabored, no cough  NEURO: AA&Ox3, speech fluent/appropriate, motor grossly nonfocal  PSYCH: cooperative, affect appropriate  DERM: no rashes on visualized face/neck/upper extremities    Assessment and Plan:   This is a 67-year-old male with a past medical history of paroxysmal a-fib s/p ablation, hypertension, hyperlipidemia, mild CAD and ascending aortic aneurysm 4.4 cm (Coronary CT 4/2022) who presents for follow-up.     - BP well controlled, continue current regimen w/losartan 100 mg daily   - Lipids well controlled, continue atorvastatin 20 mg daily  - ECHO shows stable dilation of ascending aorta (4.2 cm) 10/2022  - Recommend AC for a-fib -  he isn't convinced he is having recurrent AF and therefore declines - would consider AC if identified on cardiac monitor  - Ordered 30 day cardionet to eval for recurrent AF - patient would like to check with insurance company prior to wearing   - Discussed possibility of Watchman if he remains hesitant to start anticoagulation, he would like to consider   - RTC 6 months with echo prior     ARASH Mas, CNP  Structural Heart Nurse Practitioner  [facility]  Clinic: [phone]  Pager: [phone]      A total of 30 minutes spent face to face with patient and > 50% of the time spent counseling and coordinating care.            Please do not hesitate to contact me if you have any questions/concerns.     Sincerely,     Lizzette Flores, KANDI

## 2023-04-10 NOTE — PATIENT INSTRUCTIONS
You were seen today in the Cardiovascular Clinic at the [facility].    Cardiology provider you saw during your visit Lizzette Flores NP.    1.Continue current medication regimen given well controlled BP.   2. Start monitoring sodium intake and keep below 2300 mg daily, also avoid NSAIDS if able.   3. Continue heart healthy diet and daily exercise.  4. Recommend anticoagulation for history of paroxysmal atrial fibrillation.   5. Recommend 30 day cardiac monitor to identify a-fib and consider AC vs watchman if noted on monitor.  6. Check with your insurance to see if this Cardionet (Adjudica) monitor if covered and contact me regarding plan to place monitor.  7. Follow-up with 6 months with Dr. Rodriguez, echo and labs prior to visit.     Questions and [phone].   First press #1 for the University and then press #3 for Medical Questions to reach the Cardiology triage nurse.     On Call Cardiologist for after hours or on weekends: [phone], press option #4 and ask to speak to the on-call Cardiologist.

## 2023-04-12 ENCOUNTER — LAB (OUTPATIENT)
Dept: LAB | Facility: CLINIC | Age: 68
End: 2023-04-12
Payer: COMMERCIAL

## 2023-04-12 ENCOUNTER — OFFICE VISIT (OUTPATIENT)
Dept: INTERNAL MEDICINE | Facility: CLINIC | Age: 68
End: 2023-04-12
Payer: COMMERCIAL

## 2023-04-12 VITALS
BODY MASS INDEX: 23.13 KG/M2 | SYSTOLIC BLOOD PRESSURE: 131 MMHG | HEART RATE: 67 BPM | OXYGEN SATURATION: 99 % | WEIGHT: 165.4 LBS | DIASTOLIC BLOOD PRESSURE: 88 MMHG

## 2023-04-12 DIAGNOSIS — E78.00 HYPERCHOLESTEREMIA: ICD-10-CM

## 2023-04-12 DIAGNOSIS — Z13.6 SCREENING FOR AAA (ABDOMINAL AORTIC ANEURYSM): ICD-10-CM

## 2023-04-12 DIAGNOSIS — E78.5 HYPERLIPIDEMIA LDL GOAL <70: ICD-10-CM

## 2023-04-12 DIAGNOSIS — Z80.42 FAMILY HISTORY OF PROSTATE CANCER: ICD-10-CM

## 2023-04-12 DIAGNOSIS — R73.02 IGT (IMPAIRED GLUCOSE TOLERANCE): ICD-10-CM

## 2023-04-12 DIAGNOSIS — L30.9 DERMATITIS: Primary | ICD-10-CM

## 2023-04-12 DIAGNOSIS — I10 HYPERTENSION, UNSPECIFIED TYPE: ICD-10-CM

## 2023-04-12 LAB
CHOLEST SERPL-MCNC: 138 MG/DL
HBA1C MFR BLD: 5.8 %
HDLC SERPL-MCNC: 60 MG/DL
LDLC SERPL CALC-MCNC: 66 MG/DL
NONHDLC SERPL-MCNC: 78 MG/DL
PSA SERPL DL<=0.01 NG/ML-MCNC: 2.78 NG/ML (ref 0–4.5)
TRIGL SERPL-MCNC: 60 MG/DL

## 2023-04-12 PROCEDURE — 99000 SPECIMEN HANDLING OFFICE-LAB: CPT | Performed by: PATHOLOGY

## 2023-04-12 PROCEDURE — 36415 COLL VENOUS BLD VENIPUNCTURE: CPT | Performed by: PATHOLOGY

## 2023-04-12 PROCEDURE — 90471 IMMUNIZATION ADMIN: CPT | Performed by: INTERNAL MEDICINE

## 2023-04-12 PROCEDURE — 83036 HEMOGLOBIN GLYCOSYLATED A1C: CPT | Mod: 90 | Performed by: PATHOLOGY

## 2023-04-12 PROCEDURE — 90677 PCV20 VACCINE IM: CPT | Performed by: INTERNAL MEDICINE

## 2023-04-12 PROCEDURE — 99397 PER PM REEVAL EST PAT 65+ YR: CPT | Mod: 25 | Performed by: INTERNAL MEDICINE

## 2023-04-12 PROCEDURE — 80061 LIPID PANEL: CPT | Performed by: PATHOLOGY

## 2023-04-12 PROCEDURE — G0103 PSA SCREENING: HCPCS | Performed by: PATHOLOGY

## 2023-04-12 RX ORDER — LOSARTAN POTASSIUM 100 MG/1
100 TABLET ORAL AT BEDTIME
Qty: 90 TABLET | Refills: 3 | Status: SHIPPED | OUTPATIENT
Start: 2023-04-12 | End: 2024-02-19

## 2023-04-12 RX ORDER — ATORVASTATIN CALCIUM 20 MG/1
20 TABLET, FILM COATED ORAL DAILY
Qty: 90 TABLET | Refills: 3 | Status: SHIPPED | OUTPATIENT
Start: 2023-04-12 | End: 2023-04-13

## 2023-04-12 NOTE — PROGRESS NOTES
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  He has been doing reasonably well.  He is continue to have some hemorrhoidal irritation.  This is much improved over the last year he finds that taking the Citrucel twice a day and keeping the stools soft has a major impact on reducing the hemorrhoidal symptoms.  There is been no bleeding and no itching but occasionally will experience discomfort associated with this.  He has been seen by colorectal.  He is also following with cardiology regarding his history of atrial fibrillation.  They are encouraging him to be anticoagulated although he is not aware of any recent fibrillation.  He is trying to get a monitor study done to see if there is any silent atrial fibrillation.  Otherwise he has had a skin lesion on his left temple that has been present for the last 3 months that he is concerned about.  There is been no other skin rashes or other changes.  He monitors his blood pressure at home and this is generally running in the 120s over 70s by his report.  He is generally sleeping well he is exercising doing physical activity and chores and work but maintaining high levels of physical activity.  No other complaints or problems  Past Medical History:   Diagnosis Date     Atrial fibrillation (H)     paroxysmal with ablation 2014     Family history of prostate cancer     father  age 60     Herpes zoster 3/26/2013     Almendarez's neuroma of right foot      Psoriasis      psoriatic arthritis      Pulmonary nodules 2014    multiple noncalcified up to 6 mm needs f/u     Past Surgical History:   Procedure Laterality Date     ANESTHESIA CARDIOVERSION  10/6/2011    Procedure:ANESTHESIA CARDIOVERSION; CARDIOVERSION; Surgeon:GENERIC ANESTHESIA PROVIDER; Location:[facility] OR     ANESTHESIA CARDIOVERSION  2/10/2012    Procedure:ANESTHESIA CARDIOVERSION; CARDIOVERSION; Surgeon:GENERIC ANESTHESIA PROVIDER; Location:[facility] OR     ANESTHESIA CARDIOVERSION  2013     Procedure: ANESTHESIA CARDIOVERSION;  Cardioversion;  Surgeon: Provider, Generic Anesthesia;  Location: [facility] OR     ANESTHESIA CARDIOVERSION  2013    Procedure: ANESTHESIA CARDIOVERSION;  Cardioversion;  Surgeon: Provider, Generic Anesthesia;  Location: [facility] OR     CATARACT IOL, RT/LT Left 2015     COLONOSCOPY       ENT SURGERY      tonsillectomy     HERNIORRHAPHY INGUINAL Right 7/15/2015    Procedure: HERNIORRHAPHY INGUINAL;  Surgeon: Bk Watts MD;  Location: [facility] OR     Left Atrial Wide Area Circumferential radiofrequency ablation  2014      for atrial fib     PHACOEMULSIFICATION CLEAR CORNEA WITH STANDARD INTRAOCULAR LENS IMPLANT Right 2015    Procedure: PHACOEMULSIFICATION CLEAR CORNEA WITH STANDARD INTRAOCULAR LENS IMPLANT;  Surgeon: Petr Moyer MD;  Location: [facility]     Family History   Problem Relation Age of Onset     C.A.D. Maternal Grandfather          age 63     Hypertension Maternal Grandfather      Hypertension Mother      Alzheimer Disease Mother      Prostate Cancer Father          age 60 agressive prostate ca     Alzheimer Disease Maternal Grandmother      Glaucoma No family hx of      Macular Degeneration No family hx of          Exam:  /88 (BP Location: Right arm, Patient Position: Sitting, Cuff Size: Adult Regular)   Pulse 67   Wt 75 kg (165 lb 6.4 oz)   SpO2 99%   BMI 23.13 kg/m    165 lbs 6.4 oz  Physical Exam   The patient is alert, oriented with a clear sensorium.   Skin shows small keratosis in a patch of possible psoriasis or eczema on the left temple  Head is normocephalic and atraumatic.   Eyes show PERRLA with benign optic fundi.   Ears show normal TMs bilaterally.   Mouth shows clear oral mucosa.   Neck shows no nodes, thyromegaly   Lungs are clear to percussion and auscultation.   Heart shows normal S1 and S2 without murmur or gallop.   Abdomen is soft and nontender without masses or organomegaly.   Genitalia show normal  testes. No evidence of inguinal hernia.  Rectal shows external hemorrhoids neither thrombosed nor bleeding large smooth prostate without nodules or masses.  Extremities show no edema and no evidence of active synovitis.   Neurologic examination shows cranial nerves II-XII intact. Motor shows 5/5 strength. Reflexes are symmetric. Cerebellar testing shows normal tandem gait.  Romberg negative.    Labs reviewed:  Results for orders placed or performed in visit on 04/12/23   Prostate Specific Antigen Screen     Status: Normal   Result Value Ref Range    Prostate Specific Antigen Screen 2.78 0.00 - 4.50 ng/mL    Narrative    This result is obtained using the Roche Elecsys total PSA method on the kirit e411 immunoassay analyzer. Results obtained with different assay methods or kits cannot be used interchangeably.   Lipid Profile     Status: Normal   Result Value Ref Range    Cholesterol 138 <200 mg/dL    Triglycerides 60 <150 mg/dL    Direct Measure HDL 60 >=40 mg/dL    LDL Cholesterol Calculated 66 <=100 mg/dL    Non HDL Cholesterol 78 <130 mg/dL    Narrative    Cholesterol  Desirable:  <200 mg/dL    Triglycerides  Normal:  Less than 150 mg/dL  Borderline High:  150-199 mg/dL  High:  200-499 mg/dL  Very High:  Greater than or equal to 500 mg/dL    Direct Measure HDL  Female:  Greater than or equal to 50 mg/dL   Male:  Greater than or equal to 40 mg/dL    LDL Cholesterol  Desirable:  <100mg/dL  Above Desirable:  100-129 mg/dL   Borderline High:  130-159 mg/dL   High:  160-189 mg/dL   Very High:  >= 190 mg/dL    Non HDL Cholesterol  Desirable:  130 mg/dL  Above Desirable:  130-159 mg/dL  Borderline High:  160-189 mg/dL  High:  190-219 mg/dL  Very High:  Greater than or equal to 220 mg/dL   Hemoglobin A1c     Status: Abnormal   Result Value Ref Range    Hemoglobin A1C 5.8 (H) <5.7 %         ASSESSMENT  1 Hemorrhoids  2 Hypertension well controlled at home  3 Hyperlipidemia on atorvastatin  4 history psoriatic arthritis  5  Family history prostate cancer  6 History AF S/P ablation  7 BPH  8 IGT    Plan  We will update his immunizations with a Prevnar.  We will have him see dermatology regarding the skin lesion on the left temple and for skin check.  With his history of smoking in college we will screen for AAA.  We will reassess his PSA A1c and lipids today.  5 use hydrocortisone cream on the skin lesion  This note was completed using Dragon voice recognition software.      Brendan Ndiaye MD  General Internal Medicine  [facility]  [phone]

## 2023-04-12 NOTE — NURSING NOTE
Prince Pereira is a 67 year old male that presents in clinic today for the following:     Chief Complaint   Patient presents with     Follow Up     Pt here for follow up and wants to discuss refills       The patient's allergies and medications were reviewed. The patient's vitals were obtained without incident. The patient does not have any other questions for the provider.     Kvng Lagunas, EMT at 7:16 AM on 4/12/2023.  Primary Care Clinic: [phone]  
68.2

## 2023-04-13 RX ORDER — ATORVASTATIN CALCIUM 20 MG/1
10 TABLET, FILM COATED ORAL DAILY
Qty: 90 TABLET | Refills: 3 | COMMUNITY
Start: 2023-04-13 | End: 2023-06-20

## 2023-04-24 ENCOUNTER — ANCILLARY PROCEDURE (OUTPATIENT)
Dept: CARDIOLOGY | Facility: CLINIC | Age: 68
End: 2023-04-24
Attending: NURSE PRACTITIONER
Payer: COMMERCIAL

## 2023-04-24 ENCOUNTER — ANCILLARY PROCEDURE (OUTPATIENT)
Dept: ULTRASOUND IMAGING | Facility: CLINIC | Age: 68
End: 2023-04-24
Attending: INTERNAL MEDICINE
Payer: COMMERCIAL

## 2023-04-24 DIAGNOSIS — Z13.6 SCREENING FOR AAA (ABDOMINAL AORTIC ANEURYSM): ICD-10-CM

## 2023-04-24 DIAGNOSIS — I48.0 PAROXYSMAL ATRIAL FIBRILLATION (H): ICD-10-CM

## 2023-04-24 PROCEDURE — 93228 REMOTE 30 DAY ECG REV/REPORT: CPT | Performed by: INTERNAL MEDICINE

## 2023-04-24 PROCEDURE — 76706 US ABDL AORTA SCREEN AAA: CPT | Performed by: RADIOLOGY

## 2023-04-24 PROCEDURE — 999N000096 CARDIAC MOBILE TELEMETRY MONITOR

## 2023-04-24 NOTE — PROGRESS NOTES
Per Lizzette Flores, patient to have 30-day biotel monitor placed.  Diagnosis: PAF  Monitor placed: Yes  Patient Instructed: Yes  Patient verbalized understanding: Yes  Holter # [id number]    Monitor was placed by NATHALIA Eldridge   8:38 AM

## 2023-05-17 ENCOUNTER — TELEPHONE (OUTPATIENT)
Dept: RHEUMATOLOGY | Facility: CLINIC | Age: 68
End: 2023-05-17
Payer: MEDICARE

## 2023-05-17 NOTE — TELEPHONE ENCOUNTER
PA Initiation    Medication: ENBREL 50 MG/ML SC [place] [employer]  Insurance Company: [employer] - Phone [phone] Fax [phone]  Pharmacy Filling the Rx: [facility] - [place], [place] - [facility] [address]  Filling Pharmacy Phone:    Filling Pharmacy Fax:    Start Date: 5/17/2023    [email or web address]

## 2023-05-18 NOTE — TELEPHONE ENCOUNTER
Prior Authorization Approval    Medication: ENBREL 50 MG/ML SC SOSY  Authorization Effective Date: 6/1/2023  Authorization Expiration Date: 6/1/2024  Approved Dose/Quantity: 4 syringes per 28 days  Reference #: [id number]   Insurance Company: [employer] - Phone [phone] Fax [phone]  Expected CoPay:       CoPay Card Available:  On file    Financial Assistance Needed: None  Which Pharmacy is filling the prescription: [facility] - [place], [place] - [address]  Pharmacy Notified: Yes  Patient Notified: Yes

## 2023-05-24 ENCOUNTER — MYC MEDICAL ADVICE (OUTPATIENT)
Dept: RHEUMATOLOGY | Facility: CLINIC | Age: 68
End: 2023-05-24
Payer: MEDICARE

## 2023-05-24 DIAGNOSIS — L40.50 PSORIATIC ARTHRITIS (H): Primary | ICD-10-CM

## 2023-05-25 ENCOUNTER — TELEPHONE (OUTPATIENT)
Dept: RHEUMATOLOGY | Facility: CLINIC | Age: 68
End: 2023-05-25
Payer: MEDICARE

## 2023-05-25 NOTE — TELEPHONE ENCOUNTER
PA Initiation    Medication: HUMIRA *CF* PEN 40 MG/0.4ML SC PNKT  Insurance Company: [employer] - Phone [phone] Fax [phone]  Pharmacy Filling the Rx: [facility] - [place], [place] - [address]  Filling Pharmacy Phone:    Filling Pharmacy Fax:    Start Date: 5/25/2023    [id number]

## 2023-05-25 NOTE — TELEPHONE ENCOUNTER
Prior Authorization Specialty Medication Request    Medication/Dose: humira  ICD code (if different than what is on RX):  L40.50  Previously Tried and Failed:  enbrel    Insurance Name: [employer]  Insurance ID: [id number]  Insurance Phone Number: [phone]    Pharmacy Information (if different than what is on RX)  Name:   Speciality pharmacy    Stephanie Daniels RN

## 2023-05-30 NOTE — TELEPHONE ENCOUNTER
Prior Authorization Approval    Medication: HUMIRA *CF* PEN 40 MG/0.4ML SC PNKT  Authorization Effective Date: 5/30/2023  Authorization Expiration Date: 5/26/2024  Approved Dose/Quantity: [id number]  Reference #: [id number]   Insurance Company: [employer] - Phone [phone] Fax [phone]  Expected CoPay:       CoPay Card Available:      Financial Assistance Needed: no  Which Pharmacy is filling the prescription: [facility] - [place] [address]  Pharmacy Notified: Yes  Patient Notified: Yes

## 2023-06-08 NOTE — NURSING NOTE
Chief Complaint   Patient presents with     Physical     pt here for physical       Latanya Fall CMA at 7:29 AM on 8/15/2018.   Repair Type: Intermediate no

## 2023-06-13 ENCOUNTER — MYC MEDICAL ADVICE (OUTPATIENT)
Dept: CARDIOLOGY | Facility: CLINIC | Age: 68
End: 2023-06-13
Payer: MEDICARE

## 2023-06-14 DIAGNOSIS — E78.5 HYPERLIPIDEMIA LDL GOAL <70: ICD-10-CM

## 2023-06-19 NOTE — TELEPHONE ENCOUNTER
atorvastatin 20 mg tablet    Office Visit    4/12/2023  [facility]     Routing to provider/clinic because:   Medication is historical

## 2023-06-20 RX ORDER — ATORVASTATIN CALCIUM 20 MG/1
TABLET, FILM COATED ORAL
Qty: 90 TABLET | OUTPATIENT
Start: 2023-06-20

## 2023-06-20 RX ORDER — ATORVASTATIN CALCIUM 20 MG/1
10 TABLET, FILM COATED ORAL DAILY
Qty: 90 TABLET | Refills: 3 | Status: SHIPPED | OUTPATIENT
Start: 2023-06-20 | End: 2024-04-01

## 2023-06-21 ENCOUNTER — PHARMACY VISIT (OUTPATIENT)
Dept: PHARMACY | Facility: CLINIC | Age: 68
End: 2023-06-21
Payer: MEDICARE

## 2023-06-21 NOTE — PROGRESS NOTES
Psoriatic Arthritis Clinical Follow Up Assessment     Activity Medications    HUMIRA      Care Details    What are the patient's goals for this therapy?   ? Maintain PsA control, improve PsO.      Is patient meeting treatment goals?   ? Progressing toward goal      Please select CURRENT side effect(s) for monitoring:   ? None         Summary Notes  Spoke with Kody for assessment. Current Regimen: Humira 40 mg every other week    Med/Allergy Changes: None    Adherence: No concerns. He is happy that Humira is every other week vs weekly Enbrel. Knows how to refill.    Tolerability: Denies SE/ISR    PsA/PsO: PsA is well controlled but PsO had become more of an issue. PsO was never really bothersome at all until recently it flared up. Had been already improving on its own prior to switching to Humira but pt is hoping the Humira keeps things better managed overall. No questions/concerns.    Follow up: Annual     Dori Coronado PharmD  Therapy Management Pharmacist  [facility]

## 2023-07-31 ENCOUNTER — OFFICE VISIT (OUTPATIENT)
Dept: DERMATOLOGY | Facility: CLINIC | Age: 68
End: 2023-07-31
Attending: INTERNAL MEDICINE
Payer: COMMERCIAL

## 2023-07-31 DIAGNOSIS — L40.9 PSORIASIS: ICD-10-CM

## 2023-07-31 DIAGNOSIS — L57.0 ACTINIC KERATOSIS: ICD-10-CM

## 2023-07-31 DIAGNOSIS — L30.9 DERMATITIS: ICD-10-CM

## 2023-07-31 DIAGNOSIS — R21 RASH AND NONSPECIFIC SKIN ERUPTION: Primary | ICD-10-CM

## 2023-07-31 PROCEDURE — 99214 OFFICE O/P EST MOD 30 MIN: CPT | Mod: 25 | Performed by: STUDENT IN AN ORGANIZED HEALTH CARE EDUCATION/TRAINING PROGRAM

## 2023-07-31 PROCEDURE — 17003 DESTRUCT PREMALG LES 2-14: CPT | Performed by: STUDENT IN AN ORGANIZED HEALTH CARE EDUCATION/TRAINING PROGRAM

## 2023-07-31 PROCEDURE — 17000 DESTRUCT PREMALG LESION: CPT | Performed by: STUDENT IN AN ORGANIZED HEALTH CARE EDUCATION/TRAINING PROGRAM

## 2023-07-31 RX ORDER — TRIAMCINOLONE ACETONIDE 1 MG/G
CREAM TOPICAL 2 TIMES DAILY
Qty: 454 G | Refills: 3 | Status: SHIPPED | OUTPATIENT
Start: 2023-07-31

## 2023-07-31 NOTE — PROGRESS NOTES
[facility] Dermatology Note    Encounter Date: Jul 31, 2023    Dermatology Problem List:  #PsA; on humira     Major PMHx  -   ______________________________________    Impression/Plan:  Prince was seen today for derm problem.    Diagnoses and all orders for this visit:    Rash and nonspecific skin eruption  -     triamcinolone (KENALOG) 0.1 % external cream; Apply topically 2 times daily  -[place] shaped areas of eczematous dermatitis on bilateral shins consistent with nummular eczema  - Discussed with the patient that the differential includes paradoxical psoriasis from TNF alpha inhibitor versus nummular eczema which could just be idiopathic or age-related versus a paradoxical eczematous reaction from Humira  - At this stage of severity the difference is not important as both are treated with topical steroids  - Recommend triamcinolone cream twice daily as needed, currently his rash responds to over-the-counter hydrocortisone which is a good sign that the triamcinolone be more than sufficient  - I favor that this is nummular dermatitis and because of that increase use of moisturization may help prevent flares    Actinic keratosis  -     MT DESTRUCT PREMALIGNANT LESION, FIRST [[id number]]  -     MT DESTRUCT PREMALIGNANT LESION, 2-14 [[id number]]  -2 AK's on left temple  - see procedure note    Psoriasis  -On Humira per rheumatology due to predominant involvement of joints.      Cryotherapy procedure note: After verbal consent and discussion of risks and benefits including but no limited to dyspigmentation/scar, blister, and pain, 2 aks on L temple was(were) treated with 1-2mm freeze border for 2 cycles with liquid nitrogen. Post cryotherapy instructions were provided.     Follow-up PRN .       Staff Involved:  Staff Only    Agusto Avalos MD   of Dermatology  Department of Dermatology  [facility]      CC:   Chief Complaint   Patient presents with    Derm  Problem     [facility]       History of Present Illness:  Mr. Prince Pereira is a 67 year old male who presents as a  patient.    On humira for PsA. Doing well. Minimal cutaneous involvement. Switched to humira because of the psa outbreaks.     Using OTC hydrocortisone    Labs:      Physical exam:  Vitals: There were no vitals taken for this visit.  GEN: well developed, well-nourished, in no acute distress, in a pleasant mood.     SKIN: Mcgill phototype 1  - Full skin, which includes the head/face, both arms, chest, back, abdomen,both legs, genitalia and/or groin buttocks, digits and/or nails, was examined.  - Flat brown macules and patches in a sun exposed areas on face and extremities  - gritty pink papules on face  - coin shaped eczematous plaques on b/l shins   - No other lesions of concern on areas examined.     Past Medical History:   Past Medical History:   Diagnosis Date    Atrial fibrillation (H)     paroxysmal with ablation 2014    Family history of prostate cancer     father  age 60    Herpes zoster 3/26/2013    Almendarez's neuroma of right foot     Psoriasis     psoriatic arthritis     Pulmonary nodules 2014    multiple noncalcified up to 6 mm needs f/u     Past Surgical History:   Procedure Laterality Date    ANESTHESIA CARDIOVERSION  10/6/2011    Procedure:ANESTHESIA CARDIOVERSION; CARDIOVERSION; Surgeon:GENERIC ANESTHESIA PROVIDER; Location:[facility]    ANESTHESIA CARDIOVERSION  2/10/2012    Procedure:ANESTHESIA CARDIOVERSION; CARDIOVERSION; Surgeon:GENERIC ANESTHESIA PROVIDER; Location:[facility]    ANESTHESIA CARDIOVERSION  2013    Procedure: ANESTHESIA CARDIOVERSION;  Cardioversion;  Surgeon: Provider, Generic Anesthesia;  Location: [facility] OR    ANESTHESIA CARDIOVERSION  2013    Procedure: ANESTHESIA CARDIOVERSION;  Cardioversion;  Surgeon: Provider, Generic Anesthesia;  Location: [facility] OR    CATARACT IOL, RT/LT Left 2015    COLONOSCOPY      ENT SURGERY      tonsillectomy     HERNIORRHAPHY INGUINAL Right 7/15/2015    Procedure: HERNIORRHAPHY INGUINAL;  Surgeon: Bk Watts MD;  Location: [facility] OR    Left Atrial Wide Area Circumferential radiofrequency ablation  2014      for atrial fib    PHACOEMULSIFICATION CLEAR CORNEA WITH STANDARD INTRAOCULAR LENS IMPLANT Right 2015    Procedure: PHACOEMULSIFICATION CLEAR CORNEA WITH STANDARD INTRAOCULAR LENS IMPLANT;  Surgeon: Petr Moyer MD;  Location: [facility]       Social History:   reports that he quit smoking about 43 years ago. His smoking use included cigarettes. He has never used smokeless tobacco. He reports current alcohol use of about 8.3 standard drinks of alcohol per week. He reports that he does not use drugs.    Family History:  Family History   Problem Relation Age of Onset    C.A.D. Maternal Grandfather          age 63    Hypertension Maternal Grandfather     Hypertension Mother     Alzheimer Disease Mother     Prostate Cancer Father          age 60 agressive prostate ca    Alzheimer Disease Maternal Grandmother     Glaucoma No family hx of     Macular Degeneration No family hx of        Medications:  Current Outpatient Medications   Medication Sig Dispense Refill    adalimumab (HUMIRA *CF*) 40 MG/0.4ML pen kit Inject 0.4 mLs (40 mg) Subcutaneous every 14 days Hold for signs of infection, then seek medical attention. 1 each 5    atorvastatin (LIPITOR) 20 MG tablet Take 0.5 tablets (10 mg) by mouth daily 90 tablet 3    hydrocortisone (CORTAID) 1 % external cream Apply topically 2 times daily 120 g 4    losartan (COZAAR) 100 MG tablet Take 1 tablet (100 mg) by mouth At Bedtime 90 tablet 3    triamcinolone (KENALOG) 0.1 % external cream Apply topically 2 times daily 454 g 3     Allergies   Allergen Reactions    Other [No Clinical Screening - See Comments] Rash     Cardioversion pads cause rash.

## 2023-07-31 NOTE — PATIENT INSTRUCTIONS
Your rash on the legs looks consistent with nummular eczema  - use triamcinolone cream twice daily as needed  - maintain improvement with regular moisturization    CRYOTHERAPY    What is it?  Use of a very cold liquid, such as liquid nitrogen, to freeze and destroy abnormal skin cells that need to be removed    What should I expect?  Tenderness and redness  A small blister that might grow and fill with dark purple blood.  More than one treatment may be needed if the lesions do not go away.    How do I care for the treated area?  Gently wash the area with your hands when bathing.  Use a thin layer of VaselineÆ to help with healing.   The area should heal within 7-10 days.  Do not use an antibiotic or NeosporinÆ ointment.   You may take acetaminophen (TylenolÆ) for pain.     Call your Doctor if you have:  Severe pain  Signs of infection (warmth, redness, cloudy yellow drainage, and or a bad smell)  Questions or concerns

## 2023-07-31 NOTE — LETTER
7/31/2023         RE: Prince Pereira  [address]  [facility] [address]        Dear Colleague,    Thank you for referring your patient, Prince Pereira, to the [facility]. Please see a copy of my visit note below.    [facility] Dermatology Note    Encounter Date: Jul 31, 2023    Dermatology Problem List:  #PsA; on humira     Major PMHx  -   ______________________________________    Impression/Plan:  Prince was seen today for derm problem.    Diagnoses and all orders for this visit:    Rash and nonspecific skin eruption  -     triamcinolone (KENALOG) 0.1 % external cream; Apply topically 2 times daily  -[place] shaped areas of eczematous dermatitis on bilateral shins consistent with nummular eczema  - Discussed with the patient that the differential includes paradoxical psoriasis from TNF alpha inhibitor versus nummular eczema which could just be idiopathic or age-related versus a paradoxical eczematous reaction from Humira  - At this stage of severity the difference is not important as both are treated with topical steroids  - Recommend triamcinolone cream twice daily as needed, currently his rash responds to over-the-counter hydrocortisone which is a good sign that the triamcinolone be more than sufficient  - I favor that this is nummular dermatitis and because of that increase use of moisturization may help prevent flares    Actinic keratosis  -     DE DESTRUCT PREMALIGNANT LESION, FIRST [[id number]]  -     DE DESTRUCT PREMALIGNANT LESION, 2-14 [[id number]]  -2 AK's on left temple  - see procedure note    Psoriasis  -On Humira per rheumatology due to predominant involvement of joints.      Cryotherapy procedure note: After verbal consent and discussion of risks and benefits including but no limited to dyspigmentation/scar, blister, and pain, 2 aks on L temple was(were) treated with 1-2mm freeze border for 2 cycles with liquid nitrogen.  Post cryotherapy instructions were provided.     Follow-up PRN .       Staff Involved:  Staff Only    Agusto Avalos MD   of Dermatology  Department of Dermatology  [facility]      CC:   Chief Complaint   Patient presents with     Derm Problem     [facility]       History of Present Illness:  Mr. Prince Pereira is a 67 year old male who presents as a  patient.    On humira for PsA. Doing well. Minimal cutaneous involvement. Switched to humira because of the psa outbreaks.     Using OTC hydrocortisone    Labs:      Physical exam:  Vitals: There were no vitals taken for this visit.  GEN: well developed, well-nourished, in no acute distress, in a pleasant mood.     SKIN: Mcgill phototype 1  - Full skin, which includes the head/face, both arms, chest, back, abdomen,both legs, genitalia and/or groin buttocks, digits and/or nails, was examined.  - Flat brown macules and patches in a sun exposed areas on face and extremities  - gritty pink papules on face  - coin shaped eczematous plaques on b/l shins   - No other lesions of concern on areas examined.     Past Medical History:   Past Medical History:   Diagnosis Date     Atrial fibrillation (H)     paroxysmal with ablation 2014     Family history of prostate cancer     father  age 60     Herpes zoster 3/26/2013     Lamendarez's neuroma of right foot      Psoriasis      psoriatic arthritis      Pulmonary nodules 2014    multiple noncalcified up to 6 mm needs f/u     Past Surgical History:   Procedure Laterality Date     ANESTHESIA CARDIOVERSION  10/6/2011    Procedure:ANESTHESIA CARDIOVERSION; CARDIOVERSION; Surgeon:GENERIC ANESTHESIA PROVIDER; Location:[facility]     ANESTHESIA CARDIOVERSION  2/10/2012    Procedure:ANESTHESIA CARDIOVERSION; CARDIOVERSION; Surgeon:GENERIC ANESTHESIA PROVIDER; Location:[facility]     ANESTHESIA CARDIOVERSION  2013    Procedure: ANESTHESIA CARDIOVERSION;  Cardioversion;  Surgeon:  Provider, Generic Anesthesia;  Location: [facility]     ANESTHESIA CARDIOVERSION  2013    Procedure: ANESTHESIA CARDIOVERSION;  Cardioversion;  Surgeon: Provider, Generic Anesthesia;  Location: [facility] OR     CATARACT IOL, RT/LT Left 2015     COLONOSCOPY       ENT SURGERY      tonsillectomy     HERNIORRHAPHY INGUINAL Right 7/15/2015    Procedure: HERNIORRHAPHY INGUINAL;  Surgeon: Bk Watts MD;  Location: [facility] OR     Left Atrial Wide Area Circumferential radiofrequency ablation  2014      for atrial fib     PHACOEMULSIFICATION CLEAR CORNEA WITH STANDARD INTRAOCULAR LENS IMPLANT Right 2015    Procedure: PHACOEMULSIFICATION CLEAR CORNEA WITH STANDARD INTRAOCULAR LENS IMPLANT;  Surgeon: Petr Moyer MD;  Location: [facility]       Social History:   reports that he quit smoking about 43 years ago. His smoking use included cigarettes. He has never used smokeless tobacco. He reports current alcohol use of about 8.3 standard drinks of alcohol per week. He reports that he does not use drugs.    Family History:  Family History   Problem Relation Age of Onset     C.A.D. Maternal Grandfather          age 63     Hypertension Maternal Grandfather      Hypertension Mother      Alzheimer Disease Mother      Prostate Cancer Father          age 60 agressive prostate ca     Alzheimer Disease Maternal Grandmother      Glaucoma No family hx of      Macular Degeneration No family hx of        Medications:  Current Outpatient Medications   Medication Sig Dispense Refill     adalimumab (HUMIRA *CF*) 40 MG/0.4ML pen kit Inject 0.4 mLs (40 mg) Subcutaneous every 14 days Hold for signs of infection, then seek medical attention. 1 each 5     atorvastatin (LIPITOR) 20 MG tablet Take 0.5 tablets (10 mg) by mouth daily 90 tablet 3     hydrocortisone (CORTAID) 1 % external cream Apply topically 2 times daily 120 g 4     losartan (COZAAR) 100 MG tablet Take 1 tablet (100 mg) by mouth At Bedtime 90 tablet 3      triamcinolone (KENALOG) 0.1 % external cream Apply topically 2 times daily 454 g 3     Allergies   Allergen Reactions     Other [No Clinical Screening - See Comments] Rash     Cardioversion pads cause rash.                 Again, thank you for allowing me to participate in the care of your patient.        Sincerely,        Agusto Avalos MD

## 2023-08-01 ENCOUNTER — TELEPHONE (OUTPATIENT)
Dept: CARDIOLOGY | Facility: CLINIC | Age: 68
End: 2023-08-01
Payer: MEDICARE

## 2023-08-02 ENCOUNTER — HOSPITAL ENCOUNTER (OUTPATIENT)
Facility: CLINIC | Age: 68
End: 2023-08-02
Payer: COMMERCIAL

## 2023-08-02 ENCOUNTER — APPOINTMENT (OUTPATIENT)
Dept: MEDSURG UNIT | Facility: CLINIC | Age: 68
End: 2023-08-02
Payer: MEDICARE

## 2023-08-02 DIAGNOSIS — I48.0 PAROXYSMAL ATRIAL FIBRILLATION (H): Primary | ICD-10-CM

## 2023-08-02 RX ORDER — POTASSIUM CHLORIDE 750 MG/1
20 TABLET, EXTENDED RELEASE ORAL
Status: CANCELLED | OUTPATIENT
Start: 2023-08-02

## 2023-08-02 RX ORDER — LIDOCAINE 40 MG/G
CREAM TOPICAL
Status: CANCELLED | OUTPATIENT
Start: 2023-08-02

## 2023-08-02 RX ORDER — POTASSIUM CHLORIDE 750 MG/1
40 TABLET, EXTENDED RELEASE ORAL
Status: CANCELLED | OUTPATIENT
Start: 2023-08-02

## 2023-08-02 NOTE — TELEPHONE ENCOUNTER
Caller: Prince Pereira  Phone Number:   Home Phone [phone]   Mobile [phone]     Reason for Call:Went back into atrial fibrillation rates of 150s BP 130s SBP.      Symptoms: Denies (unless bold) SOB, CP, Lightheadedness, syncope, or falls. Did have a sensation of palpitations prior to Afib.      Previous Relevant Imaging/Notes/Labs: Reviewed      Plan  Discussed with Kody that since his rates are only mildly elevated to 130s and he is otherwise hemodynamically stable and has self converted in the past that he can take his usual losartan and metoprolol.  I explained that if he becomes more symptomatic or hemodynamically unstable as assessed by his blood pressure and symptoms, he should present to the emergency department emergently.  I will notify his provider, Lizzette Flores, so that she may follow-up with him tomorrow morning and determine whether or not he has converted or if they would like him to present for cardioversion or other therapy.  He understood and had no further questions and is expecting follow-up with Lizzette tomorrow morning.    Eliseo Braswell MD  Fellow Physician PGY-6  Division of Cardiovascular Disease  After Hours Consult Pager [phone]

## 2023-08-02 NOTE — PROGRESS NOTES
Patient called in new onset a-fib since last night at 6:00 pm. He is minimally symptomatic and hemodynamically stable. Plan to start Eliquis 5 mg BID this morning. Remain NPO. Plan for DCCV this afternoon. No LUC given onset < 48 hours.

## 2023-08-16 NOTE — PROGRESS NOTES
[facility]  Rheumatology Clinic  Steve Pandey MD  2023     Name: Prince Pereira  MRN: [id number]  Age: 67 year old  : 1955  Referring provider: Brendan Ndiaye     Assessment and Plan:  # Psoriatic arthritis (diagnosed before ; less than 5% body surface area psoriasis: [id number]; RX Enbrel since ):   The patient relates absence of inflammatory joint symptoms. He notes stable, occasional focal psoriasis, responsive to topical therapy. Exam shows no psoriasis on head and neck. Laboratory evaluation  showed comprehensive metabolic panel, and CBC normal.    Psoriatic arthritis remains completely suppressed on etanercept monotherapy. Reliable return of symptoms with even short delay in etanercept dosing suggests the presence of ongoing inflammatory disease, however.  I recommend continuing etanercept 50 mg subcutaneous once every 10 days. He may use ibuprofen 400 to 600 mg as needed for occasional joint pain. Continue PRN topical therapy for mild psoriasis.  Annual monitoring of creatinine and transaminases is suggested.    #COVID-19: Patient received 3 doses of COVID-19 vaccine, the most recent in 2021.  We discussed that he may be considered immunosuppressed given his treatment with etanercept, and that he would be eligible for a fourth dose or booster of mRNA vaccine no less than 5 months after the third dose of vaccine.    #Health maintenance: Patient continues follow-up with cardiology for atrial fibrillation, and with primary care for hypertension and hyperlipidemia.    Return to clinic in one year.     Orders Placed This Encounter   Procedures    Comprehensive metabolic panel     Standing Status:   Future     Standing Expiration Date:   2024    CBC with platelets     Standing Status:   Future     Standing Expiration Date:   2024     Steve Pandey MD  Staff Rheumatologist, [facility]      Follow-up: Return in about 9 months (around  "5/17/2024) for in person, using a video visit.     HPI:   Prince Chang has a history of psoriatic arthritis, psoriasis, and atrial fibrillation status post ablation who presents for follow up.  I last saw the patient in Jan 2022, when psoriatic arthritis was judged quiescent.  I recommended continuing etanercept monotherapy, and using ibuprofen as needed occasional joint pain.    Interval history August 17, 2023    Patient was seen in dermatology on July 31, 2023.  Impression was of probable nummular dermatitis.  Differential diagnosis includes paradoxical psoriasis from TNF alpha inhibitor which could be just idiopathic or paradoxical eczematous reaction from Humira.  Recommendation was to increase use of moisturization and triamcinolone cream twice daily as needed.    Today, he reports that mild achiness and swelling in multiple fingers of both hands that he experienced several months ago is no longer a problem.  He has no morning stiffness.  Activities of daily living are not disrupted by joint symptoms.  He recently has performed heavy work with a maul and chainsaw at his cabin without difficulty.    Dime sized, intensely itchy erythematous patches on his legs have resolved with use of triamcinolone and hydrocortisone cream after his visit with dermatology.  He has persistent \"balloon\" nails with roughness, and notes persistent toenail fungus, but no recurrence of psoriasis in scalp extensor surfaces.      He notes several little cancre sores in the past week; he is not sure whether humira is different from enbrel wrt control of these ulcers. Sores usually last 7-10 days. Has never tried anbusol    Has had 2 episodes of Afib, last several weeks ago. Cards has recommended anticoagulation; he started eliquis.    Interval history January 28, 2022    Health is good. He still notes onset of chancre sores if he delays the enbrel by a few days. He has noted this once in the past 6 months. But no joint " stiffness or pain or swelling. No persistent psoriasis in his lower legs or behind his ears or knees. No use of NSAIDs.  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    Nothing as severe as at the onset of psoriatic arthriitis > 15 years ago.    He has continued enbrel 50 mg q 10 days.   He is active, playing tennis every few weeks, working with a sit-stand desk, but less aerobic activity than previously.    He had 3 doses of COVID19 vax, last one in early Fall.    Interval history 02-:    Health is good. He started lipitor for increased cholesterol per Dr. Ndiaye.  He is active, playing tennis once a week, working with a sit-stand desk, but less aerobic activity than previously. 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 Nothing as severe as at the onset of psoriatic arthriitis.  He has continued enbrel 50 mg q 10 days.   He still notes onset of chancre sores if he delays the enbrel by a few days. But no joint stiffness or pain or swelling. No persistent psoriasis in his lower legs or behind his ears or knees. No use of NSAIDs.    Taking losartan and statin. Will get an Echo on 2-2022 to followup A-fib history. He had no episodes of fib on a 2 week monitoring, but he may need anticoagulation anyway.    History 2-2020:     Background:  Very occasional psoriasis back in the 1970s, but has very rare skin involvement in general with the exception of a patch that he applied hydrocortisone to behind his ear and behind his knee. The patient states that his joint symptoms started in 1999 with plantar fasciitis that progressed to hand, feet, and lateral hip pain. He states he started with Naproxen, moved on to Methotrexate topical, then oral methotrexate, and finally Enbrel. He states the Methotrexate did not really help his symptoms. However, he reports that the Enbrel has nearly resolved his symptoms and also seems to improve his canker sores on the buccal mucosa stating  "this is his warning sign to take Enbrel. He states he takes Enbrel every 7 to 10 days, and if he stretches beyond 10 days between Enbrel he notes returning joint pain. The patient denies red or dry eyes. He also denies chest discomfort though he does report mild congestion at times. He denies any injection site reactions. He denies limitations in physical activity. He notes nail changes in his toes likely due to fungus. The patient reports that he takes Naproxen as needed for unrelated pain, but does not need it for his joints.     HISTORY CARRIED FORWARD:  To review, he was diagnosed with psoriatic arthritis 14+ years ago, and it has been well controlled with Enbrel  He manages to stretch his injections to once every 8-10 days, and he notes that the development of canker sores are reproducibly an indication to take his medication. He has not noticed any inflammation or irritation at the injection site. He is not experiencing any active inflammation or pain. The patient states that his range of motion is \"almost back to normal,\" with his continued use of Enbrel. He has some intermittent shoulder pain on R that is rotator cuff related, exacerbated by tennis.  Additionally, the patient has a past medical history of atrial fibrillation since 1988, that required recent cardioversion in February; he had a second episode days later that resolved with Flecanide. He was given flecainide to use if his symptoms return, but has not needed to use the medication to date. The patient currently takes metoprolol XL and aspirin for his atrial fibrillation. He also occasionally experiences reflux, and this has been managed with omeprazole once a day.   Overall, the patient has been quite pleased with his medication, and plans to continue using Enbrel to manage his psoriatic arthritis. He has had only a small patch of psoriasis on L shin now resolved almost with Triamcinolone.  He had a bout of herpes zoster in February 2013 that " resolved. He had Valtrex and has no post herpetic pain. We discussed in the past the lack of consensus about risk vs. Benefit of Zostavax in patients on anti TNF therapy. He had no questions or concerns.   He saw Nina Smith late 2014 and then Dr. Pandey emergently in late December for question of possible GCA. CRP and ESR were normal. He did have a TA bx by Dr. Ramon Lagunas which was negative. His symptoms  gradually abated and were absent a year ago.  He had a hernia repair in 2015 with pre op physical by Dr. Ndiaye, and interval followup by Dr. Hawthorne regarding prior stable pulmonary nodules and does not require followup.  Unfortunately he had a post op hematoma after surgery that opened and had to heal by secondary intention.  He was off Enbrel for about 8 weeks due to this. He did note right at the end of that slight return of hand stiffness and pain and slightly more psoriasis. This came back under excellent control after resuming and he is now close to asymptomatic taking the Enbrel on average every 10-14 days.     Review of Systems:   Pertinent items are noted in HPI or as below, remainder of complete ROS is negative.      No recent problems with hearing or vision. No swallowing problems.   No breathing difficulty, shortness of breath, coughing, or wheezing  No chest pain or palpitations  No heart burn, indigestion, abdominal pain, nausea, vomiting  No urination problems, no bloody, cloudy urine, no dysuria  No numbing, tingling, weakness  No headaches or confusion  No rashes. No easy bleeding or bruising.     +canker sores    Active Medications:   Current Outpatient Medications   Medication Sig    adalimumab (HUMIRA *CF*) 40 MG/0.4ML pen kit Inject 0.4 mLs (40 mg) Subcutaneous every 14 days Hold for signs of infection, then seek medical attention.    apixaban ANTICOAGULANT (ELIQUIS) 5 MG tablet Take 1 tablet (5 mg) by mouth 2 times daily    atorvastatin (LIPITOR) 20 MG tablet Take 0.5 tablets (10 mg)  "by mouth daily    hydrocortisone (CORTAID) 1 % external cream Apply topically 2 times daily    losartan (COZAAR) 100 MG tablet Take 1 tablet (100 mg) by mouth At Bedtime    triamcinolone (KENALOG) 0.1 % external cream Apply topically 2 times daily     No current facility-administered medications for this visit.           Allergies:   Other [no clinical screening - see comments]      Past Medical History:  Atrial fibrillation   Herpes Zoster  Almendarez's neuroma of the right foot  Psoriasis   Psoriatic arthritis   Pulmonary nodules   Esophageal reflux  Enlargement of prostate benign      Past Surgical History:  Cardioversion 10/6/2011, 2/10/2012, 2/8/2013, 11/6/2013  Cataract IOL, right /left   Tonsillectomy  Colonoscopy  Herniorrhaphy inguinal right 7/15/2015  Left atrial wide area circumferential radiofrequency ablation 2/13/2014  Phacoemulsification clear cornea with standard intraocular lens implant 6/1/2015    Family History:   Maternal grandfather: coronary artery disease , hypertension   Mother: hypertension, alzheimer disease  Father: prostate cancer (age 60)      Social History:   Smoking Status: former smoker for 40 years   Smokeless Tobacco: Never Used  Alcohol Use: Yes   Occasional, 10 drink equivalents per week   Drug Use: No  PCP: Brendan Ndiaye   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      Physical Exam:   /80 (BP Location: Left arm, Patient Position: Sitting, Cuff Size: Adult Regular)   Pulse 71   Resp 14   Ht 1.778 m (5' 10\")   Wt 74.2 kg (163 lb 9.6 oz)   SpO2 97%   BMI 23.47 kg/m     Wt Readings from Last 4 Encounters:   08/17/23 74.2 kg (163 lb 9.6 oz)   04/12/23 75 kg (165 lb 6.4 oz)   04/10/23 74.9 kg (165 lb 3.2 oz)   12/07/22 74.8 kg (165 lb)     Constitutional: Well-developed, appearing stated age; cooperative  Eyes: Normal EOM, PERRLA, vision, conjunctiva, sclera  ENT: Normal external ears, nose, hearing, lips, teeth, gums, throat. No mucous membrane lesions, normal " saliva pool  Neck: No mass or thyroid enlargement  Resp: breathing unlabored  MS: No swelling MCP and PIP. Some angulation in bilateral 5th DIP in both hands. Dorsal medial aspect of left 1st DIP is slightly prominent, firm swelling. Excellent  strength. Excellent wrist range of motion bilaterally. The TMJ, neck, shoulder, elbow, wrist were normal.  There is modest bilateral bunion change and onycholysis at multiple toenails.  Skin: No nail pitting, alopecia, rash, nodules or lesions  Neuro: Normal cranial nerves  Psych: Normal judgement, orientation, memory, affect.     Laboratory:       Latest Ref Rng & Units 2/14/2022     7:49 AM 4/4/2022     9:44 AM 10/5/2022     9:42 AM   RHEUM RESULTS   Albumin 3.5 - 5.2 g/dL   4.4    ALT 10 - 50 U/L   44    AST 10 - 50 U/L   38    Creatinine 0.67 - 1.17 mg/dL 0.88  0.84  0.84    GFR Estimate >60 mL/min/1.73m2 >90  >90  >90    Hematocrit 40.0 - 53.0 % 40.5      Hemoglobin 13.3 - 17.7 g/dL 13.4      WBC 4.0 - 11.0 10e3/uL 6.9      RBC Count 4.40 - 5.90 10e6/uL 4.55      RDW 10.0 - 15.0 % 12.2      MCHC 31.5 - 36.5 g/dL 33.1      MCV 78 - 100 fL 89      Platelet Count 150 - 450 10e3/uL 165

## 2023-08-17 ENCOUNTER — OFFICE VISIT (OUTPATIENT)
Dept: RHEUMATOLOGY | Facility: CLINIC | Age: 68
End: 2023-08-17
Payer: COMMERCIAL

## 2023-08-17 VITALS
BODY MASS INDEX: 23.42 KG/M2 | SYSTOLIC BLOOD PRESSURE: 125 MMHG | WEIGHT: 163.6 LBS | OXYGEN SATURATION: 97 % | HEIGHT: 70 IN | DIASTOLIC BLOOD PRESSURE: 80 MMHG | HEART RATE: 71 BPM | RESPIRATION RATE: 14 BRPM

## 2023-08-17 DIAGNOSIS — L40.50 PSORIATIC ARTHRITIS (H): Primary | ICD-10-CM

## 2023-08-17 PROCEDURE — 99214 OFFICE O/P EST MOD 30 MIN: CPT | Performed by: INTERNAL MEDICINE

## 2023-08-17 ASSESSMENT — PAIN SCALES - GENERAL: PAINLEVEL: NO PAIN (0)

## 2023-08-17 NOTE — PATIENT INSTRUCTIONS
Diagnosis:  1.  Psoriatic arthritis: Both skin and joint inflammation are well controlled with humira, used only for a short time. I recommend no change in humira  2. Oral ulcers: make a trial of oral cream    Plan:  1.  Continue adalimumab (Humira) 40 mg subcu every other week.  Monitor for dosing cycle dependent joint and skin symptoms.  2.  Check liver function, kidney function, and complete blood count ~ once yearly.  3. Trial of anbusol gel/ointment to painful ulcers prn.

## 2023-08-17 NOTE — NURSING NOTE
"Chief Complaint   Patient presents with    RECHECK     Psoriatic arthritis       Vitals:    08/17/23 0720   BP: 125/80   BP Location: Left arm   Patient Position: Sitting   Cuff Size: Adult Regular   Pulse: 71   Resp: 14   SpO2: 97%   Weight: 74.2 kg (163 lb 9.6 oz)   Height: 1.778 m (5' 10\")       Body mass index is 23.47 kg/m .    Little Phillip, LIZF  "

## 2023-10-12 ENCOUNTER — MYC MEDICAL ADVICE (OUTPATIENT)
Dept: RHEUMATOLOGY | Facility: CLINIC | Age: 68
End: 2023-10-12
Payer: MEDICARE

## 2023-10-18 NOTE — PROGRESS NOTES
I am delighted to see Prince Pereira for follow up of atrial fibrillation.    The patient is a 68 year old  male with recurrent persistent AF who underwent AF ablation 2/13/2014. Prior to ablation he had several cardioversions. Since ablations he's had rare episodes of AF, in May 2017, 5/23/2020, 7/29/2021, all lasted 2-4 hours before spontaneous conversion. He has a history of hypertension, and since he turned 64 we had been discussing chronic anticoagulation which he had declined. I last saw him a year ago at which he was doing well.    On 8/1/2023 he had an episode of AF while watching TV, occurred ~ 6:30 pm, he felt flushing and rapid irregular heart beats, he checked his KardiaMobile which read out AF ~ 130-140 bpm. He went to take get his metoprolol bottle and realized that he had inadvertently taken metoprolol instead of his losartan for the last 2 nights (he has metoprolol as needed and had not taken any for a few years), so he did NOT take metoprolol that evening. HSymptoms gradually improved although he was still in AF per Kardia and rates were still ~ 120s. He was able to go to sleep, woke up the next day, still in AF, contacted Lizzette Flores and a cardioversion was scheduled for that afternoon. He went to work, spontaneously converted by 10:30 am. Came in to document an EKG that showed sinus. He was prescribed apixaban in anticipation of the cardioversion and he has continued to take apixaban without any bleeding issues. No recurrent episodes.    He normally sees Lizzette Flores for follow up dilated ascending aorta.    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 He continues to be very active.    Past Medical History:  1. Atrial fibrillation, first episode 1987, s/p PVI 2/13/2014, prolonged hospital stay due to femoral hematoma. Multiple cardioversions prior to PVI. Recurrent 2017 and May 2020, each time 2-4 hours. Recurrent 7/29/2021, 3 hours.  8/1/2023, duration ~ 15 hours.  2. Pulmonary nodules  3. Psoriatic arthritis  4. Herpes zoster  5. Hypertension  6. Hyperlipidemia   7. Dilated ascending aorta 4.4 cm 3/2022    Allergies:    Allergies   Allergen Reactions    Other [No Clinical Screening - See Comments] Rash     Cardioversion pads cause rash.       Medications:   Apixaban 5 bid  Atorvastatin 10 every day  Losartan 100 mg qd  Enbrel injections      Physical examination  Vitals: 149/90 (was 130/70 this morning at home); HR 75  BMI=22 (72 kg)    Constitutional: In general, the patient is a pleasant male in no acute distress.    Targeted cardiovascular exam:  Regular rate and rhythm. Normal S1, S2. No murmur, rub, click, or gallop.   Extremities:Pulses are normal bilaterally throughout. No peripheral edema.  Respiratory: Clear to asculation.      I have personally and independently reviewed the following:  Labs:  4/12/2023: chol 138, HDL 60, LDL 66, TG 60, A1C 5.8    Echo today 10/19/2023:  EF 60-65%, normal RV, dilated ascending aorta 4.2 cm, stable. TONEY 20    CTA 4/4/2022: calcium score 39; right dominant, RCA 25-49%;   Proximal ascending aorta 4.4 cm    EKG:   TODAY 10/19/2023: sinus 71 bpm, normal intervals  8/2/2023: sinus 80 bpm    MCOT monitor 4/24-5/23/2023: no AF        Assessment :  Atrial fibrillation, paroxysmal. He is always symptomatic when AF starts, with flushing sensation and irregular pulse. Symptoms do improve, and he is functional when in AF. He has had infrequent episodes - prior to 8/1/2023, his last episode was in July 2021. Episodes have always spontaneously converted since his AF ablation. Offered reassurance. Discussed options, including continuing observation, addition of flecainide, or repeat ablation if significant recurrence. His [id number] score is 2 for HTN and age, and he is now on apixban 5 bid which he has agreed to continue.   Hypertension. He monitors at home closely, aggressive control is important for AF. He is  on losartan 100 every day. May need additional agents if home BP consistently > 120/80.  Dilated ascending aorta, stable per echo today, BP management as above.      Plan:  As his episodes are infrequent, we will continue to monitor.  He has a KardiaMobile at home which is very good at monitoring rhythm.  He does not need ambulatory monitors or to come in for EKGs to document rhythm - he can send KardiaMobile tracings through [facility] to me at any time and I can follow his rhythm.    I will see him back in a year, but I have encouraged him to notify me of any symptoms or arrhythmia tracings for review.          I spent a total of 20 minutes face to face with  Prince Pereira during today's office visit. I have spent an additional 15 minutes today on chart review and documentation.      The patient is to return  as above. The patient understood the treatment plan as outlined above.  There were no barriers to learning.      Vera Rodriguez MD

## 2023-10-19 ENCOUNTER — OFFICE VISIT (OUTPATIENT)
Dept: CARDIOLOGY | Facility: CLINIC | Age: 68
End: 2023-10-19
Attending: INTERNAL MEDICINE
Payer: COMMERCIAL

## 2023-10-19 ENCOUNTER — ANCILLARY PROCEDURE (OUTPATIENT)
Dept: CARDIOLOGY | Facility: CLINIC | Age: 68
End: 2023-10-19
Attending: NURSE PRACTITIONER
Payer: COMMERCIAL

## 2023-10-19 VITALS
WEIGHT: 160.1 LBS | DIASTOLIC BLOOD PRESSURE: 90 MMHG | BODY MASS INDEX: 22.97 KG/M2 | OXYGEN SATURATION: 100 % | SYSTOLIC BLOOD PRESSURE: 149 MMHG | HEART RATE: 75 BPM

## 2023-10-19 DIAGNOSIS — I77.810 ASCENDING AORTA DILATION (H): ICD-10-CM

## 2023-10-19 DIAGNOSIS — I10 BENIGN ESSENTIAL HYPERTENSION: Primary | ICD-10-CM

## 2023-10-19 DIAGNOSIS — I48.0 PAROXYSMAL ATRIAL FIBRILLATION (H): ICD-10-CM

## 2023-10-19 LAB — LVEF ECHO: NORMAL

## 2023-10-19 PROCEDURE — 99215 OFFICE O/P EST HI 40 MIN: CPT | Mod: 25 | Performed by: INTERNAL MEDICINE

## 2023-10-19 PROCEDURE — 93005 ELECTROCARDIOGRAM TRACING: CPT

## 2023-10-19 PROCEDURE — 99213 OFFICE O/P EST LOW 20 MIN: CPT | Performed by: INTERNAL MEDICINE

## 2023-10-19 PROCEDURE — 93010 ELECTROCARDIOGRAM REPORT: CPT | Performed by: INTERNAL MEDICINE

## 2023-10-19 PROCEDURE — 93306 TTE W/DOPPLER COMPLETE: CPT | Mod: GC | Performed by: INTERNAL MEDICINE

## 2023-10-19 ASSESSMENT — PAIN SCALES - GENERAL: PAINLEVEL: NO PAIN (0)

## 2023-10-19 NOTE — PATIENT INSTRUCTIONS
You were seen in the Electrophysiology Clinic today by: Dr. Vera Rodriguez    Plan:   Medication Changes:  none    Labs/Tests Needed: none    Follow up Visit: 1 year with Dr. Vera Rodriguez    Further instruction: Aggressive hydration!      If you have further questions, please utilize [employer] to contact us.     Your Care Team:    [facility] Cardiology   Telephone Number     Nurse Line  Dariana Amador, SVITLANA Cary, RN  Niels Tran RN   [phone]     For scheduling appointments:   [facility]y   [phone]   For procedure scheduling:    Ekaterina Borrero     [phone]   For the Device Clinic (Pacemakers, ICDs, Loop Recorders)    During business hours: [phone]  After business hours:   [phone]- select option 4 and ask for job code [id number].       On-call cardiologist for after hours or on weekends:   [phone], option #4, and ask to speak to the on-call cardiologist.     Cardiovascular Clinic:   [address] [place] [address]. [place] [address]      As always, Thank you for trusting us with your health care needs!

## 2023-10-19 NOTE — LETTER
10/19/2023      RE: Prince Pereira  [address]  [facility] [address]       Dear Colleague,    Thank you for the opportunity to participate in the care of your patient, Prince Pereira, at the [facility] [place] [facility]. Please see a copy of my visit note below.    I am delighted to see Prince Pereira for follow up of atrial fibrillation.    The patient is a 68 year old  male with recurrent persistent AF who underwent AF ablation 2/13/2014. Prior to ablation he had several cardioversions. Since ablations he's had rare episodes of AF, in May 2017, 5/23/2020, 7/29/2021, all lasted 2-4 hours before spontaneous conversion. He has a history of hypertension, and since he turned 64 we had been discussing chronic anticoagulation which he had declined. I last saw him a year ago at which he was doing well.    On 8/1/2023 he had an episode of AF while watching TV, occurred ~ 6:30 pm, he felt flushing and rapid irregular heart beats, he checked his KardiaMobile which read out AF ~ 130-140 bpm. He went to take get his metoprolol bottle and realized that he had inadvertently taken metoprolol instead of his losartan for the last 2 nights (he has metoprolol as needed and had not taken any for a few years), so he did NOT take metoprolol that evening. HSymptoms gradually improved although he was still in AF per Kardia and rates were still ~ 120s. He was able to go to sleep, woke up the next day, still in AF, contacted Lizzette Flores and a cardioversion was scheduled for that afternoon. He went to work, spontaneously converted by 10:30 am. Came in to document an EKG that showed sinus. He was prescribed apixaban in anticipation of the cardioversion and he has continued to take apixaban without any bleeding issues. No recurrent episodes.    He normally sees Lizzette Flores for follow up dilated ascending aorta.    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 He continues to be very active.    Past Medical History:  1. Atrial fibrillation, first episode 1987, s/p PVI 2/13/2014, prolonged hospital stay due to femoral hematoma. Multiple cardioversions prior to PVI. Recurrent 2017 and May 2020, each time 2-4 hours. Recurrent 7/29/2021, 3 hours. 8/1/2023, duration ~ 15 hours.  2. Pulmonary nodules  3. Psoriatic arthritis  4. Herpes zoster  5. Hypertension  6. Hyperlipidemia   7. Dilated ascending aorta 4.4 cm 3/2022    Allergies:    Allergies   Allergen Reactions    Other [No Clinical Screening - See Comments] Rash     Cardioversion pads cause rash.       Medications:   Apixaban 5 bid  Atorvastatin 10 every day  Losartan 100 mg qd  Enbrel injections      Physical examination  Vitals: 149/90 (was 130/70 this morning at home); HR 75  BMI=22 (72 kg)    Constitutional: In general, the patient is a pleasant male in no acute distress.    Targeted cardiovascular exam:  Regular rate and rhythm. Normal S1, S2. No murmur, rub, click, or gallop.   Extremities:Pulses are normal bilaterally throughout. No peripheral edema.  Respiratory: Clear to asculation.      I have personally and independently reviewed the following:  Labs:  4/12/2023: chol 138, HDL 60, LDL 66, TG 60, A1C 5.8    Echo today 10/19/2023:  EF 60-65%, normal RV, dilated ascending aorta 4.2 cm, stable. TONEY 20    CTA 4/4/2022: calcium score 39; right dominant, RCA 25-49%;   Proximal ascending aorta 4.4 cm    EKG:   TODAY 10/19/2023: sinus 71 bpm, normal intervals  8/2/2023: sinus 80 bpm    MCOT monitor 4/24-5/23/2023: no AF        Assessment :  Atrial fibrillation, paroxysmal. He is always symptomatic when AF starts, with flushing sensation and irregular pulse. Symptoms do improve, and he is functional when in AF. He has had infrequent episodes - prior to 8/1/2023, his last episode was in July 2021. Episodes have always spontaneously  converted since his AF ablation. Offered reassurance. Discussed options, including continuing observation, addition of flecainide, or repeat ablation if significant recurrence. His [id number] score is 2 for HTN and age, and he is now on apixban 5 bid which he has agreed to continue.   Hypertension. He monitors at home closely, aggressive control is important for AF. He is on losartan 100 every day. May need additional agents if home BP consistently > 120/80.  Dilated ascending aorta, stable per echo today, BP management as above.      Plan:  As his episodes are infrequent, we will continue to monitor.  He has a KardiaMobile at home which is very good at monitoring rhythm.  He does not need ambulatory monitors or to come in for EKGs to document rhythm - he can send KardiaMobile tracings through [employer] to me at any time and I can follow his rhythm.    I will see him back in a year, but I have encouraged him to notify me of any symptoms or arrhythmia tracings for review.          I spent a total of 20 minutes face to face with  Prince Pereira during today's office visit. I have spent an additional 15 minutes today on chart review and documentation.      The patient is to return  as above. The patient understood the treatment plan as outlined above.  There were no barriers to learning.      Vera Rodriguez MD

## 2023-10-19 NOTE — NURSING NOTE
Chief Complaint   Patient presents with    Follow Up     1 yr follow up for PAF      Vitals were taken and medications reconciled.    Julio Cesar Redman, EMT  7:54 AM

## 2023-10-20 LAB
ATRIAL RATE - MUSE: 71 BPM
DIASTOLIC BLOOD PRESSURE - MUSE: NORMAL MMHG
INTERPRETATION ECG - MUSE: NORMAL
P AXIS - MUSE: 71 DEGREES
PR INTERVAL - MUSE: 168 MS
QRS DURATION - MUSE: 86 MS
QT - MUSE: 372 MS
QTC - MUSE: 404 MS
R AXIS - MUSE: 59 DEGREES
SYSTOLIC BLOOD PRESSURE - MUSE: NORMAL MMHG
T AXIS - MUSE: 37 DEGREES
VENTRICULAR RATE- MUSE: 71 BPM

## 2023-10-25 ENCOUNTER — MYC MEDICAL ADVICE (OUTPATIENT)
Dept: CARDIOLOGY | Facility: CLINIC | Age: 68
End: 2023-10-25
Payer: MEDICARE

## 2023-10-25 DIAGNOSIS — I48.0 PAROXYSMAL ATRIAL FIBRILLATION (H): ICD-10-CM

## 2023-11-15 ENCOUNTER — MYC MEDICAL ADVICE (OUTPATIENT)
Dept: CARDIOLOGY | Facility: CLINIC | Age: 68
End: 2023-11-15
Payer: MEDICARE

## 2023-11-15 DIAGNOSIS — I48.0 PAROXYSMAL ATRIAL FIBRILLATION (H): ICD-10-CM

## 2024-02-19 ENCOUNTER — MYC MEDICAL ADVICE (OUTPATIENT)
Dept: INTERNAL MEDICINE | Facility: CLINIC | Age: 69
End: 2024-02-19
Payer: COMMERCIAL

## 2024-02-19 DIAGNOSIS — I10 HYPERTENSION, UNSPECIFIED TYPE: ICD-10-CM

## 2024-02-19 RX ORDER — LOSARTAN POTASSIUM 100 MG/1
100 TABLET ORAL AT BEDTIME
Qty: 90 TABLET | Refills: 0 | Status: SHIPPED | OUTPATIENT
Start: 2024-02-19 | End: 2024-04-01

## 2024-02-29 ENCOUNTER — TELEPHONE (OUTPATIENT)
Dept: RHEUMATOLOGY | Facility: CLINIC | Age: 69
End: 2024-02-29
Payer: COMMERCIAL

## 2024-02-29 NOTE — TELEPHONE ENCOUNTER
Prior Authorization Not Needed per Insurance    Medication: HUMIRA *CF* PEN 40 MG/0.4ML SC PNKT  Insurance Company: [employer] - Phone [phone] Fax [phone]  Expected CoPay: $    Pharmacy Filling the Rx: [facility] - [place], [place] - [address]   Pharmacy Notified: yes  Patient Notified: yes

## 2024-03-29 SDOH — HEALTH STABILITY: PHYSICAL HEALTH: ON AVERAGE, HOW MANY MINUTES DO YOU ENGAGE IN EXERCISE AT THIS LEVEL?: 40 MIN

## 2024-03-29 SDOH — HEALTH STABILITY: PHYSICAL HEALTH: ON AVERAGE, HOW MANY DAYS PER WEEK DO YOU ENGAGE IN MODERATE TO STRENUOUS EXERCISE (LIKE A BRISK WALK)?: 2 DAYS

## 2024-03-29 ASSESSMENT — SOCIAL DETERMINANTS OF HEALTH (SDOH): HOW OFTEN DO YOU GET TOGETHER WITH FRIENDS OR RELATIVES?: TWICE A WEEK

## 2024-04-01 ENCOUNTER — OFFICE VISIT (OUTPATIENT)
Dept: INTERNAL MEDICINE | Facility: CLINIC | Age: 69
End: 2024-04-01
Payer: COMMERCIAL

## 2024-04-01 ENCOUNTER — LAB (OUTPATIENT)
Dept: LAB | Facility: CLINIC | Age: 69
End: 2024-04-01
Payer: COMMERCIAL

## 2024-04-01 VITALS
BODY MASS INDEX: 23.52 KG/M2 | SYSTOLIC BLOOD PRESSURE: 145 MMHG | OXYGEN SATURATION: 99 % | HEIGHT: 70 IN | WEIGHT: 164.3 LBS | HEART RATE: 78 BPM | DIASTOLIC BLOOD PRESSURE: 94 MMHG

## 2024-04-01 DIAGNOSIS — I10 HYPERTENSION, UNSPECIFIED TYPE: ICD-10-CM

## 2024-04-01 DIAGNOSIS — L40.50 PSORIATIC ARTHRITIS (H): ICD-10-CM

## 2024-04-01 DIAGNOSIS — R73.02 IGT (IMPAIRED GLUCOSE TOLERANCE): Primary | ICD-10-CM

## 2024-04-01 DIAGNOSIS — E78.5 HYPERLIPIDEMIA LDL GOAL <70: ICD-10-CM

## 2024-04-01 DIAGNOSIS — Z12.5 SCREENING FOR PROSTATE CANCER: ICD-10-CM

## 2024-04-01 DIAGNOSIS — R73.02 IGT (IMPAIRED GLUCOSE TOLERANCE): ICD-10-CM

## 2024-04-01 LAB
ALBUMIN SERPL BCG-MCNC: 4.6 G/DL (ref 3.5–5.2)
ALP SERPL-CCNC: 62 U/L (ref 40–150)
ALT SERPL W P-5'-P-CCNC: 16 U/L (ref 0–70)
ANION GAP SERPL CALCULATED.3IONS-SCNC: 10 MMOL/L (ref 7–15)
AST SERPL W P-5'-P-CCNC: 23 U/L (ref 0–45)
BILIRUB SERPL-MCNC: 0.4 MG/DL
BUN SERPL-MCNC: 11.9 MG/DL (ref 8–23)
CALCIUM SERPL-MCNC: 9 MG/DL (ref 8.8–10.2)
CHLORIDE SERPL-SCNC: 107 MMOL/L (ref 98–107)
CHOLEST SERPL-MCNC: 151 MG/DL
CREAT SERPL-MCNC: 0.86 MG/DL (ref 0.67–1.17)
DEPRECATED HCO3 PLAS-SCNC: 24 MMOL/L (ref 22–29)
EGFRCR SERPLBLD CKD-EPI 2021: >90 ML/MIN/1.73M2
ERYTHROCYTE [DISTWIDTH] IN BLOOD BY AUTOMATED COUNT: 12.3 % (ref 10–15)
FASTING STATUS PATIENT QL REPORTED: YES
GLUCOSE SERPL-MCNC: 110 MG/DL (ref 70–99)
HBA1C MFR BLD: 5.6 %
HCT VFR BLD AUTO: 38.7 % (ref 40–53)
HDLC SERPL-MCNC: 61 MG/DL
HGB BLD-MCNC: 12.8 G/DL (ref 13.3–17.7)
LDLC SERPL CALC-MCNC: 79 MG/DL
MCH RBC QN AUTO: 29.6 PG (ref 26.5–33)
MCHC RBC AUTO-ENTMCNC: 33.1 G/DL (ref 31.5–36.5)
MCV RBC AUTO: 90 FL (ref 78–100)
NONHDLC SERPL-MCNC: 90 MG/DL
PLATELET # BLD AUTO: 157 10E3/UL (ref 150–450)
POTASSIUM SERPL-SCNC: 4.1 MMOL/L (ref 3.4–5.3)
PROT SERPL-MCNC: 7.1 G/DL (ref 6.4–8.3)
PSA SERPL DL<=0.01 NG/ML-MCNC: 3.46 NG/ML (ref 0–4.5)
RBC # BLD AUTO: 4.32 10E6/UL (ref 4.4–5.9)
SODIUM SERPL-SCNC: 141 MMOL/L (ref 135–145)
TRIGL SERPL-MCNC: 53 MG/DL
WBC # BLD AUTO: 6.1 10E3/UL (ref 4–11)

## 2024-04-01 PROCEDURE — 85027 COMPLETE CBC AUTOMATED: CPT | Performed by: PATHOLOGY

## 2024-04-01 PROCEDURE — 83036 HEMOGLOBIN GLYCOSYLATED A1C: CPT | Performed by: INTERNAL MEDICINE

## 2024-04-01 PROCEDURE — 99214 OFFICE O/P EST MOD 30 MIN: CPT | Performed by: INTERNAL MEDICINE

## 2024-04-01 PROCEDURE — 36415 COLL VENOUS BLD VENIPUNCTURE: CPT | Performed by: PATHOLOGY

## 2024-04-01 PROCEDURE — 80061 LIPID PANEL: CPT | Performed by: PATHOLOGY

## 2024-04-01 PROCEDURE — 80053 COMPREHEN METABOLIC PANEL: CPT | Performed by: PATHOLOGY

## 2024-04-01 PROCEDURE — G0103 PSA SCREENING: HCPCS | Performed by: PATHOLOGY

## 2024-04-01 PROCEDURE — 99000 SPECIMEN HANDLING OFFICE-LAB: CPT | Performed by: PATHOLOGY

## 2024-04-01 RX ORDER — LOSARTAN POTASSIUM 100 MG/1
100 TABLET ORAL AT BEDTIME
Qty: 90 TABLET | Refills: 3 | Status: SHIPPED | OUTPATIENT
Start: 2024-04-01

## 2024-04-01 RX ORDER — ATORVASTATIN CALCIUM 20 MG/1
10 TABLET, FILM COATED ORAL DAILY
Qty: 90 TABLET | Refills: 3 | Status: SHIPPED | OUTPATIENT
Start: 2024-04-01 | End: 2024-08-22 | Stop reason: DRUGHIGH

## 2024-04-01 NOTE — PROGRESS NOTES
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  He is planning upcoming longterm.  He is uneffaced longterm plan now and plans to finish within the next year.  He has not been doing much in the way of actual exercise but he is physically active.  A lot of this involves projects and physical work that he is doing around his house and for his kids.  He is committed to increasing his physical activity.  He is eating healthy uses fiber periodically to help control his hemorrhoids.  He still intermittently has issues with the hemorrhoids and with intermittent rectal bleeding but not motivated to do anything more aggressively at this point.  We did discuss doing sitz bath's on a regular basis.  He is reduced his alcohol consumption his blood pressure is monitored regularly and has drifted up somewhat recently in the last 3 months is averaging about 135/83.  We discussed nonpharmacologic blood pressure control measures and continued home blood pressure monitoring in light of this.  Otherwise he has no specific complaints or concerns today  Past Medical History:   Diagnosis Date    Atrial fibrillation (H)     paroxysmal with ablation 2014    Family history of prostate cancer     father  age 60    Herpes zoster 2013    Hypertension     Almendarez's neuroma of right foot     Psoriasis     psoriatic arthritis     Pulmonary nodules 2014    multiple noncalcified up to 6 mm needs f/u     Past Surgical History:   Procedure Laterality Date    ANESTHESIA CARDIOVERSION  10/6/2011    Procedure:ANESTHESIA CARDIOVERSION; CARDIOVERSION; Surgeon:GENERIC ANESTHESIA PROVIDER; Location:[facility]    ANESTHESIA CARDIOVERSION  2/10/2012    Procedure:ANESTHESIA CARDIOVERSION; CARDIOVERSION; Surgeon:GENERIC ANESTHESIA PROVIDER; [facility]    ANESTHESIA CARDIOVERSION  2013    Procedure: ANESTHESIA CARDIOVERSION;  Cardioversion;  Surgeon: Provider, Generic Anesthesia;  Location: [facility] OR    ANESTHESIA  "CARDIOVERSION  2013    Procedure: ANESTHESIA CARDIOVERSION;  Cardioversion;  Surgeon: Provider, Generic Anesthesia;  Location: [facility] OR    CATARACT IOL, RT/LT Left 2015    COLONOSCOPY      ENT SURGERY      tonsillectomy    HERNIORRHAPHY INGUINAL Right 7/15/2015    Procedure: HERNIORRHAPHY INGUINAL;  Surgeon: Bk Watts MD;  Location: [facility] OR    Left Atrial Wide Area Circumferential radiofrequency ablation  2014      for atrial fib    PHACOEMULSIFICATION CLEAR CORNEA WITH STANDARD INTRAOCULAR LENS IMPLANT Right 2015    Procedure: PHACOEMULSIFICATION CLEAR CORNEA WITH STANDARD INTRAOCULAR LENS IMPLANT;  Surgeon: Petr Moyer MD;  Location: [facility]     Family History   Problem Relation Age of Onset    C.A.D. Maternal Grandfather          age 63    Hypertension Maternal Grandfather     Hypertension Mother     Alzheimer Disease Mother     Prostate Cancer Father          age 60    Alzheimer Disease Maternal Grandmother     Depression Daughter     Anxiety Disorder Daughter     Glaucoma No family hx of     Macular Degeneration No family hx of          Exam:  BP (!) 145/94 (BP Location: Left arm, Patient Position: Sitting, Cuff Size: Adult Regular)   Pulse 78   Ht 1.778 m (5' 10\")   Wt 74.5 kg (164 lb 4.8 oz)   SpO2 99%   BMI 23.57 kg/m    164 lbs 4.8 oz  Physical Exam   The patient is alert, oriented with a clear sensorium.   Skin shows no lesions or rashes and good turgor.   Head is normocephalic and atraumatic.   Eyes show PERRLA with benign optic fundi.   Ears show normal TMs bilaterally.   Mouth shows clear oral mucosa.   Neck shows no nodes, thyromegaly or bruits.   Back is non tender.  Lungs are clear to percussion and auscultation.   Heart shows normal S1 and S2 with 2/6 SE murmur.   Abdomen is soft and nontender without masses or organomegaly.   Genitalia show normal testes. No evidence of inguinal hernia.  Rectal shows external hemorrhoids large firm smooth " asymmetrical prostate without nodules or masses.  Extremities show no edema and no evidence of active synovitis.   Neurologic examination shows cranial nerves II-XII intact. Motor shows 5/5 strength. Reflexes are symmetric. Cerebellar testing shows normal tandem gait.  Romberg negative.    Labs reviewed:  Results for orders placed or performed in visit on 04/01/24   CBC with platelets     Status: Abnormal   Result Value Ref Range    WBC Count 6.1 4.0 - 11.0 10e3/uL    RBC Count 4.32 (L) 4.40 - 5.90 10e6/uL    Hemoglobin 12.8 (L) 13.3 - 17.7 g/dL    Hematocrit 38.7 (L) 40.0 - 53.0 %    MCV 90 78 - 100 fL    MCH 29.6 26.5 - 33.0 pg    MCHC 33.1 31.5 - 36.5 g/dL    RDW 12.3 10.0 - 15.0 %    Platelet Count 157 150 - 450 10e3/uL   Comprehensive metabolic panel     Status: Abnormal   Result Value Ref Range    Sodium 141 135 - 145 mmol/L    Potassium 4.1 3.4 - 5.3 mmol/L    Carbon Dioxide (CO2) 24 22 - 29 mmol/L    Anion Gap 10 7 - 15 mmol/L    Urea Nitrogen 11.9 8.0 - 23.0 mg/dL    Creatinine 0.86 0.67 - 1.17 mg/dL    GFR Estimate >90 >60 mL/min/1.73m2    Calcium 9.0 8.8 - 10.2 mg/dL    Chloride 107 98 - 107 mmol/L    Glucose 110 (H) 70 - 99 mg/dL    Alkaline Phosphatase 62 40 - 150 U/L    AST 23 0 - 45 U/L    ALT 16 0 - 70 U/L    Protein Total 7.1 6.4 - 8.3 g/dL    Albumin 4.6 3.5 - 5.2 g/dL    Bilirubin Total 0.4 <=1.2 mg/dL   Lipid Profile     Status: None   Result Value Ref Range    Cholesterol 151 <200 mg/dL    Triglycerides 53 <150 mg/dL    Direct Measure HDL 61 >=40 mg/dL    LDL Cholesterol Calculated 79 <=100 mg/dL    Non HDL Cholesterol 90 <130 mg/dL    Patient Fasting > 8hrs? Yes     Narrative    Cholesterol  Desirable:  <200 mg/dL    Triglycerides  Normal:  Less than 150 mg/dL  Borderline High:  150-199 mg/dL  High:  200-499 mg/dL  Very High:  Greater than or equal to 500 mg/dL    Direct Measure HDL  Female:  Greater than or equal to 50 mg/dL   Male:  Greater than or equal to 40 mg/dL    LDL  Cholesterol  Desirable:  <100mg/dL  Above Desirable:  100-129 mg/dL   Borderline High:  130-159 mg/dL   High:  160-189 mg/dL   Very High:  >= 190 mg/dL    Non HDL Cholesterol  Desirable:  130 mg/dL  Above Desirable:  130-159 mg/dL  Borderline High:  160-189 mg/dL  High:  190-219 mg/dL  Very High:  Greater than or equal to 220 mg/dL   Hemoglobin A1c     Status: Normal   Result Value Ref Range    Hemoglobin A1C 5.6 <5.7 %   PSA, screen     Status: Normal   Result Value Ref Range    Prostate Specific Antigen Screen 3.46 0.00 - 4.50 ng/mL    Narrative    This result is obtained using the Roche Elecsys total PSA method on the kirit e411 immunoassay analyzer. Results obtained with different assay methods or kits cannot be used interchangeably.       ASSESSMENT  1 Hemorrhoids  2 Hypertension well controlled at home  3 Hyperlipidemia on atorvastatin  4 history psoriatic arthritis on Humira  5 Family history prostate cancer  6 History AF S/P ablation  7 BPH  8 IGT  9 Dilated ascending aorta followed by cardiology    Plan  We discussed nonpharmacologic blood pressure control measures home blood pressure monitoring will reassess his labs today I refilled his losartan and his atorvastatin.  He has reduced the dose of atorvastatin.  He is current on his immunizations and colonoscopy.  Follow-up in a year sooner if the blood pressure drifts upward.  This note was completed using Dragon voice recognition software.      Brendan Ndiaye MD  General Internal Medicine  [facility]  [phone]

## 2024-04-01 NOTE — PROGRESS NOTES
Kody is a 68 year old that presents in clinic today for the following:     Chief Complaint   Patient presents with    Physical           4/1/2024     6:50 AM   Additional Questions   Roomed by CHELIO, NATHALIA Guillory at 6:52 AM on 4/1/2024

## 2024-05-04 ENCOUNTER — TRANSFERRED RECORDS (OUTPATIENT)
Dept: HEALTH INFORMATION MANAGEMENT | Facility: CLINIC | Age: 69
End: 2024-05-04

## 2024-05-07 ENCOUNTER — TELEPHONE (OUTPATIENT)
Dept: OPHTHALMOLOGY | Facility: CLINIC | Age: 69
End: 2024-05-07
Payer: COMMERCIAL

## 2024-05-07 DIAGNOSIS — L40.50 PSORIATIC ARTHRITIS (H): Primary | ICD-10-CM

## 2024-05-13 ENCOUNTER — TELEPHONE (OUTPATIENT)
Dept: RHEUMATOLOGY | Facility: CLINIC | Age: 69
End: 2024-05-13
Payer: COMMERCIAL

## 2024-05-13 NOTE — TELEPHONE ENCOUNTER
PA Initiation    Medication: HUMIRA *CF* PEN 40 MG/0.4ML SC PNKT  Insurance Company: [facility] - Phone [phone] Fax [phone]  Pharmacy Filling the Rx: [facility] - [place], [place] - [address]  Filling Pharmacy Phone:    Filling Pharmacy Fax:    Start Date: 5/13/2024    Key: [id number]

## 2024-05-13 NOTE — TELEPHONE ENCOUNTER
Prior Authorization Not Needed per Insurance    Medication: HUMIRA *CF* PEN 40 MG/0.4ML SC PNKT  Insurance Company: [employer] - Phone [phone] Fax [phone]  Expected CoPay: $    Pharmacy Filling the Rx: [facility] - [place], [place] - [facility] [address]   Pharmacy Notified: no  Patient Notified: no    Key: [id number]     PA will  on 25

## 2024-05-16 RX ORDER — ADALIMUMAB 40MG/0.4ML
40 KIT SUBCUTANEOUS
Qty: 2.4 ML | Refills: 0 | Status: SHIPPED | OUTPATIENT
Start: 2024-05-16 | End: 2024-07-26

## 2024-05-16 NOTE — TELEPHONE ENCOUNTER
adalimumab (HUMIRA *CF*) 40 MG/0.4ML pen kit 1 each 8 8/17/2023       Last Office Visit: 8/17/23  Future Office visit:   NONE    Refill protocol passed    Eden Chakraborty RN  P Red Flag Triage/MRT

## 2024-05-30 ENCOUNTER — OFFICE VISIT (OUTPATIENT)
Dept: OPHTHALMOLOGY | Facility: CLINIC | Age: 69
End: 2024-05-30
Attending: OPHTHALMOLOGY
Payer: COMMERCIAL

## 2024-05-30 DIAGNOSIS — H59.099: Primary | ICD-10-CM

## 2024-05-30 DIAGNOSIS — Z96.1 PSEUDOPHAKIA, BOTH EYES: ICD-10-CM

## 2024-05-30 DIAGNOSIS — H27.112 SUBLUXATION OF LENS, LEFT EYE: ICD-10-CM

## 2024-05-30 DIAGNOSIS — H52.13 SEVERE MYOPIA OF BOTH EYES: ICD-10-CM

## 2024-05-30 PROCEDURE — G0463 HOSPITAL OUTPT CLINIC VISIT: HCPCS | Performed by: OPHTHALMOLOGY

## 2024-05-30 PROCEDURE — 76519 ECHO EXAM OF EYE: CPT | Performed by: OPHTHALMOLOGY

## 2024-05-30 PROCEDURE — 99204 OFFICE O/P NEW MOD 45 MIN: CPT | Mod: GC | Performed by: OPHTHALMOLOGY

## 2024-05-30 RX ORDER — PREDNISOLONE ACETATE 10 MG/ML
1-2 SUSPENSION/ DROPS OPHTHALMIC 4 TIMES DAILY
Qty: 10 ML | Refills: 0 | Status: SHIPPED | OUTPATIENT
Start: 2024-05-30 | End: 2024-08-22

## 2024-05-30 ASSESSMENT — VISUAL ACUITY
OS_BAT_MED: 20/25
OS_CC: 20/20
CORRECTION_TYPE: GLASSES
OD_CC: 20/20
OD_BAT_HIGH: 20/20
OS_BAT_HIGH: 20/30
METHOD: SNELLEN - LINEAR

## 2024-05-30 ASSESSMENT — REFRACTION_WEARINGRX
OS_SPHERE: -0.75
OD_CYLINDER: SPHERE
OS_CYLINDER: +0.50
OD_ADD: +2.25
OS_AXIS: 169
OD_SPHERE: -0.25
OS_ADD: +2.25

## 2024-05-30 ASSESSMENT — CUP TO DISC RATIO
OS_RATIO: 0.1
OD_RATIO: 0.1

## 2024-05-30 ASSESSMENT — CONF VISUAL FIELD
OD_INFERIOR_TEMPORAL_RESTRICTION: 0
OD_SUPERIOR_TEMPORAL_RESTRICTION: 0
OS_SUPERIOR_NASAL_RESTRICTION: 0
OS_INFERIOR_NASAL_RESTRICTION: 0
OD_SUPERIOR_NASAL_RESTRICTION: 0
OS_NORMAL: 1
OS_INFERIOR_TEMPORAL_RESTRICTION: 0
OD_INFERIOR_NASAL_RESTRICTION: 0
OD_NORMAL: 1
OS_SUPERIOR_TEMPORAL_RESTRICTION: 0

## 2024-05-30 ASSESSMENT — TONOMETRY
IOP_METHOD: TONOPEN
OD_IOP_MMHG: 12
OS_IOP_MMHG: 15

## 2024-05-30 ASSESSMENT — EXTERNAL EXAM - RIGHT EYE: OD_EXAM: NORMAL

## 2024-05-30 ASSESSMENT — SLIT LAMP EXAM - LIDS
COMMENTS: NORMAL
COMMENTS: NORMAL

## 2024-05-30 ASSESSMENT — EXTERNAL EXAM - LEFT EYE: OS_EXAM: NORMAL

## 2024-05-30 NOTE — NURSING NOTE
Chief Complaints and History of Present Illnesses   Patient presents with    Blurred Vision Left Eye     Chief Complaint(s) and History of Present Illness(es)       Blurred Vision Left Eye              Laterality: left eye    Onset: 1 month ago              Comments    Pt. States that he noticed sudden severe blurred vision LE 1 month ago. Was seen by an Ophthalmologist and was told he likely had capsularly bag distention syndrome. Was put on prednisolone QID LE May 4th and used for 2 weeks. VA seems to be back to baseline now. No pain BE. Does have some dryness LE. No flashes BE. Does see occasional floaters BE.  Kaye Amezquita COT 7:21 AM May 30, 2024

## 2024-05-30 NOTE — PROGRESS NOTES
Chief Complaint(s) and History of Present Illness(es)       Blurred Vision Left Eye              Laterality: left eye    Onset: 1 month ago              Comments    Pt. States that he noticed sudden severe blurred vision LE 1 month ago. Was seen by an Ophthalmologist and was told he likely had capsularly bag distention syndrome. Was put on prednisolone QID LE May 4th and used for 2 weeks. VA seems to be back to baseline now. No pain BE. Does have some dryness LE. No flashes BE. Does see occasional floaters BE.  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 Subluxation of lens, left eye    3. Pseudophakia, both eyes    4. Severe myopia of both eyes        Sudden onset of blurry vision in left eye 1 month ago.  Started over a few days while at his cabin in [place].  Seen by ophthalmologist in [place] who prescribed a 2 week course of pred forte with improvement in symptoms.  No with very minimal blur.  Denies flashes, floaters, pain or redness    Discussed atypical presentation of capsular distention syndrome left eye   Less likely indolent P. Acnes   RBA to YAG capsulotomy discussed  Will monitor symptoms and exam for now  Return precautions reviewed     Breanna Penn MD  PGY-2  Department of Ophthalmology  May 30, 2024 8:29 AM              Attending Physician Attestation:  Complete documentation of historical and exam elements from today's encounter can be found in the full encounter summary report (not reduplicated in this progress note).  I personally obtained the chief complaint(s) and history of present illness.  I confirmed and edited as necessary the review of systems, past medical/surgical history, family history, social history, and examination findings as documented by others; and I examined the patient myself.  I personally reviewed the  relevant tests, images, and reports as documented above.  I formulated and edited as necessary the assessment and plan and discussed the findings and management plan with the patient and family. Attending Physician Image/Tesing Attestation: I personally reviewed the ophthalmic test(s) associated with this encounter, agree with the interpretation(s) as documented by the resident/fellow, and have edited the corresponding report(s) as necessary.  . - Petr Moyer MD

## 2024-06-15 ENCOUNTER — VIRTUAL VISIT (OUTPATIENT)
Dept: URGENT CARE | Facility: CLINIC | Age: 69
End: 2024-06-15
Payer: COMMERCIAL

## 2024-06-15 DIAGNOSIS — U07.1 INFECTION DUE TO 2019 NOVEL CORONAVIRUS: Primary | ICD-10-CM

## 2024-06-15 PROCEDURE — 99213 OFFICE O/P EST LOW 20 MIN: CPT | Mod: 95

## 2024-06-15 NOTE — PROGRESS NOTES
"Kody is a 68 year old who is being evaluated via a billable phone visit.      Wife also + earlier this week.  +COVID test this AM, sx started yesterday.    Has used Paxlovid in past.  Is a psychiatrist.    Assessment & Plan     Infection due to 2019 novel coronavirus    - nirmatrelvir and ritonavir (PAXLOVID) 300 mg/100 mg therapy pack; Take 3 tablets by mouth 2 times daily for 5 days (Take 2 Nirmatrelvir tablets and 1 Ritonavir tablet twice daily for 5 days)    COVID-19 positive patient.  Encounter for consideration of medication intervention. Patient does qualify for a prescription. Full discussion with patient including medication options, risks and benefits. Potential drug interactions reviewed with patient.     Treatment Planned Paxlovid RX sent to [facility]    Temporary change to home medications: YES:  Reduce Eliquis dosing by 50% taking 1/2 tab twice daily while on Paxlovid.x 7 days.  Resume regular dose 1 tab twice daily on day #8.  HOLD atorvastatin while on Paxlovid x 7 days.  May resume on day #8.    Estimated body mass index is 23.57 kg/m  as calculated from the following:    Height as of 4/1/24: 1.778 m (5' 10\").    Weight as of 4/1/24: 74.5 kg (164 lb 4.8 oz).  GFR Estimate   Date Value Ref Range Status   04/01/2024 >90 >60 mL/min/1.73m2 Final   07/01/2021 69 >60 mL/min/[1.73_m2] Final     Comment:     Non  GFR Calc  Starting 12/18/2018, serum creatinine based estimated GFR (eGFR) will be   calculated using the Chronic Kidney Disease Epidemiology Collaboration   (CKD-EPI) equation.       Vy Saenz MD  [facility]    Phone call duration # 8 minutes    Subjective   Kody is a 68 year old, presenting for the following health issues:  No chief complaint on file.    HPI     Wife also + earlier this week.  +COVID test this AM, sx started yesterday.    Has used Paxlovid in past.  Is a psychiatrist.        Review of Systems  Constitutional, neuro, ENT, endocrine, pulmonary, cardiac, " gastrointestinal, genitourinary, musculoskeletal, integument and psychiatric systems are negative, except as otherwise noted.      Objective           Vitals:  No vitals were obtained today due to virtual visit.    Physical Exam   GENERAL: alert and no distress  PSYCH: Appropriate affect, tone, and pace of words

## 2024-06-28 ENCOUNTER — TELEPHONE (OUTPATIENT)
Dept: CARDIOLOGY | Facility: CLINIC | Age: 69
End: 2024-06-28
Payer: COMMERCIAL

## 2024-06-28 NOTE — TELEPHONE ENCOUNTER
6/28 Patient confirmed scheduled appointment:  Date: 8/28/2024  Time: 12:45 pm  Visit type: Return Cardiology  Provider: Modesto Todd  Location: [facility]  Testing/imaging: N/A  Additional notes: N/A

## 2024-07-18 RX ORDER — ATORVASTATIN CALCIUM 10 MG/1
10 TABLET, FILM COATED ORAL DAILY
Qty: 450 TABLET | Refills: 0 | OUTPATIENT
Start: 2024-07-18

## 2024-07-22 DIAGNOSIS — L40.50 PSORIATIC ARTHRITIS (H): ICD-10-CM

## 2024-07-24 ENCOUNTER — MYC REFILL (OUTPATIENT)
Dept: INTERNAL MEDICINE | Facility: CLINIC | Age: 69
End: 2024-07-24
Payer: COMMERCIAL

## 2024-07-24 DIAGNOSIS — E78.5 HYPERLIPIDEMIA LDL GOAL <70: Primary | ICD-10-CM

## 2024-07-24 RX ORDER — ATORVASTATIN CALCIUM 20 MG/1
10 TABLET, FILM COATED ORAL DAILY
Qty: 90 TABLET | Refills: 3 | Status: CANCELLED | OUTPATIENT
Start: 2024-07-24

## 2024-07-24 RX ORDER — ATORVASTATIN CALCIUM 10 MG/1
10 TABLET, FILM COATED ORAL DAILY
Qty: 90 TABLET | Refills: 3 | Status: SHIPPED | OUTPATIENT
Start: 2024-07-24

## 2024-07-24 NOTE — TELEPHONE ENCOUNTER
Patient requesting new Rx for atorvastatin (LIPITOR) 10 MG tablet take 1 tab QD  - Last ordered as 20 mg tabs take 1/2 tab QD

## 2024-07-26 RX ORDER — ADALIMUMAB 40MG/0.4ML
KIT SUBCUTANEOUS
Qty: 2.4 ML | Refills: 1 | Status: SHIPPED | OUTPATIENT
Start: 2024-07-26 | End: 2024-08-22

## 2024-07-26 NOTE — TELEPHONE ENCOUNTER
adalimumab (HUMIRA *CF*) 40 MG/0.4ML prefilled syringe kit     2.4 mL 0 5/16/2024       Last Office Visit : 8-   Future Office visit:  12-3-2024

## 2024-08-14 ENCOUNTER — MYC MEDICAL ADVICE (OUTPATIENT)
Dept: RHEUMATOLOGY | Facility: CLINIC | Age: 69
End: 2024-08-14
Payer: COMMERCIAL

## 2024-08-14 DIAGNOSIS — L40.50 PSORIATIC ARTHRITIS (H): Primary | ICD-10-CM

## 2024-08-15 RX ORDER — ADALIMUMAB-BWWD 40 MG/.4ML
40 SOLUTION SUBCUTANEOUS
Qty: 0.8 ML | Refills: 4 | Status: CANCELLED | OUTPATIENT
Start: 2024-08-15

## 2024-08-15 NOTE — TELEPHONE ENCOUNTER
Patients insurance would like him to change to a bio similar ([employer]). Patient would like to change this now. He has a follow up visit with Dr. Pandey in December. Rx pended.     Jesi Christianson RN  Adult [facility]ology Clinic

## 2024-08-16 NOTE — TELEPHONE ENCOUNTER
Called patient and he is aware and agreeable to MTM referral. He may not answer right away, he is ok with a retailed message and will call back.     Jesi Christianson RN  Adult Rheumatology Clinic

## 2024-08-19 ENCOUNTER — TELEPHONE (OUTPATIENT)
Dept: RHEUMATOLOGY | Facility: CLINIC | Age: 69
End: 2024-08-19
Payer: COMMERCIAL

## 2024-08-19 NOTE — TELEPHONE ENCOUNTER
Called pt to complete RAPID-3 assessment for upcoming new MTM appointment on 8/22.    Pt did not answer, but I LVM and will send MyC with assessment in it.

## 2024-08-20 ENCOUNTER — TELEPHONE (OUTPATIENT)
Dept: RHEUMATOLOGY | Facility: CLINIC | Age: 69
End: 2024-08-20
Payer: COMMERCIAL

## 2024-08-20 NOTE — TELEPHONE ENCOUNTER
Please select the one best answer for the patient's ability at this time     Dress yourself including tying shoelaces and doing buttons?    With out difficulty: 0   Get in and out of bed   With out difficulty: 0   Lift a full cup or glass to your mouth  With out difficulty: 0   Walk outdoors on flat ground    With out difficulty: 0   Wash and dry your whole body    With out difficulty: 0   Bend down to  clothing from the ground    With out difficulty: 0   Turn regular faucets on and off    With out difficulty: 0   Get out of a car, bus, train, airplane   With out difficulty: 0   Walk 2 miles or 3 kilometers, if you wish?    With out difficulty: 0   Participate in recreational activities and sports as you would like if you wish?   With out difficulty: 0   How much pain have you had because of your condition in the last week? Please indicate how severe your pain has been on a scale of 0-10 (with 0 being no pain at all and 10 being pain as worse as it could be)  1  Considering all the ways in which illness and health conditions may affect you at this time, please indicate how you are doing on the scale of 0-10 (with 0 being very well and 10 being very poor)  1    Score: 2  0-3 Near remission  3.1-6 Low  6.1-12 Moderate  12.1-30 High

## 2024-08-22 ENCOUNTER — VIRTUAL VISIT (OUTPATIENT)
Dept: RHEUMATOLOGY | Facility: CLINIC | Age: 69
End: 2024-08-22
Attending: INTERNAL MEDICINE
Payer: COMMERCIAL

## 2024-08-22 DIAGNOSIS — I48.0 PAROXYSMAL ATRIAL FIBRILLATION (H): ICD-10-CM

## 2024-08-22 DIAGNOSIS — L40.50 PSORIATIC ARTHRITIS (H): Primary | ICD-10-CM

## 2024-08-22 DIAGNOSIS — I10 HYPERTENSION, UNSPECIFIED TYPE: ICD-10-CM

## 2024-08-22 DIAGNOSIS — E78.00 HYPERCHOLESTEREMIA: ICD-10-CM

## 2024-08-22 RX ORDER — ADALIMUMAB-BWWD 40 MG/.4ML
40 SOLUTION SUBCUTANEOUS
Qty: 2.4 ML | Refills: 1 | Status: SHIPPED | OUTPATIENT
Start: 2024-08-22

## 2024-08-22 NOTE — PROGRESS NOTES
Medication Therapy Management (MTM) Encounter    ASSESSMENT:                            Medication Adherence/Access: No issues identified    Psoriatic Arthritis: Patient tolerating and finding good efficacy to Humira - would benefit from continuing adalimumab 40 mg every 14 days. Due to patient preference and insurance formulary, will change to adalimumab biosimilar Hadlima 40 mg every 14 days.    Hyperlipidemia: Stable.    Hypertension/Afib: Patient is meeting BP goal of < 140/90mmHg per last clinic reading. Would benefit from additional clinic reading at next in person appointment due to reading being several months old prior to making any medication changes.    PLAN:                            1. We are working on obtaining insurance coverage for Hadlima (adalimumab biosimilar). Once this is approved the pharmacy will call you to set up delivery so you can start 1 injection (40 mg) every 14 days.     2. A common side effect of Hadlima is injection site reactions (red, raised, itchy spot at injection site). You can use hydrocortisone cream and ice to treat these reactions if they occur.     3. Please send MTM a mychart if you receive any other letters from [facility] informing you of needing to change to a different adalimumab biosimilar.    Follow-up: Return in about 6 months (around 2/22/2025) for MTM Pharmacist Visit.    SUBJECTIVE/OBJECTIVE:                          Kody Pereira is a 68 year old male seen for an initial visit. He was referred to me from Steve Pandey MD.      Reason for visit: Received letter from insurance that adalimumab biosimilars are now covered and he would like to switch.    Allergies/ADRs: Reviewed in chart  Past Medical History: Reviewed in chart  Tobacco: He reports that he quit smoking about 44 years ago. His smoking use included cigarettes. He started smoking about 45 years ago. He has a 0.5 pack-year smoking history. He has never used smokeless tobacco.  Alcohol: 7-9  beverages / week  Social: Retired physician    Medication Adherence/Access: no issues reported    Psoriatic Arthritis:   Humira 40 mg every 14 days  Triamcinolone 0.1% cream twice daily as needed   Hydrocortisone 1% cream twice daily as needed     Reports he is doing very well on Humira - no joint or skin symptoms currently. No side effects noted. Notes he got a letter from [facility] noting that adalimumab biosimilars are now covered. Would like to switch to the covered biosimilar today.    Liver Function Studies -   Recent Labs   Lab Test 04/01/24 0752   PROTTOTAL 7.1   ALBUMIN 4.6   BILITOTAL 0.4   ALKPHOS 62   AST 23   ALT 16     CBC RESULTS:   Recent Labs   Lab Test 04/01/24 0752   WBC 6.1   RBC 4.32*   HGB 12.8*   HCT 38.7*   MCV 90   MCH 29.6   MCHC 33.1   RDW 12.3        Hyperlipidemia:   Atorvastatin 10 mg daily  Patient reports no current medication side effects.    Recent Labs   Lab Test 04/01/24 0752 04/12/23  0832   CHOL 151 138   HDL 61 60   LDL 79 66   TRIG 53 60     Hypertension/Afib:   Losartan 100 mg nightly  Eliquis 5 mg twice daily     Pt reports no medication side effects or concerns.    BP Readings from Last 3 Encounters:   04/01/24 (!) 145/94   10/19/23 (!) 149/90   08/17/23 125/80     Today's Vitals: There were no vitals taken for this visit.  ----------------    I spent 15 minutes with this patient today. All changes were made via collaborative practice agreement with Steve Pandey MD. A copy of the visit note was provided to the patient's provider(s).    A summary of these recommendations was sent via [employer].    Raeann Villalobos, PharmD  Medication Therapy Management Pharmacist  [facility]  Phone: [phone]    Telemedicine Visit Details  Type of service:  Video Conference via [employer]  Start Time: 8:00 AM  End Time: 8:15 AM     Medication Therapy Recommendations  Psoriatic arthritis (H)    Current Medication: adalimumab (HUMIRA *CF*) 40 MG/0.4ML prefilled  syringe kit (Discontinued)   Rationale: Patient prefers not to take - Adherence - Adherence   Recommendation: Change Medication - Hadlima 40 MG/0.4ML Sosy - 40 mg every 14 days   Status: Accepted per CPA

## 2024-08-22 NOTE — Clinical Note
8/22/2024       RE: Prince Pereira  [address]  [place] [facility] [address]     Dear Colleague,    Thank you for referring your patient, Prince Pereira, to the [facility]OLOG[facility] [place] at [facility]. Please see a copy of my visit note below.    Medication Therapy Management (MTM) Encounter    ASSESSMENT:                            Medication Adherence/Access: No issues identified    Psoriatic Arthritis: Patient tolerating and finding good efficacy to Humira - would benefit from continuing adalimumab 40 mg every 14 days. Due to patient preference and insurance formulary, will change to adalimumab biosimilar Hadlima 40 mg every 14 days.    Hyperlipidemia: Stable.    Hypertension/Afib: Patient is meeting BP goal of < 140/90mmHg per last clinic reading. Would benefit from additional clinic reading at next in person appointment due to reading being several months old prior to making any medication changes.    PLAN:                            1. We are working on obtaining insurance coverage for Hadlima (adalimumab biosimilar). Once this is approved the pharmacy will call you to set up delivery so you can start 1 injection (40 mg) every 14 days.     2. A common side effect of Hadlima is injection site reactions (red, raised, itchy spot at injection site). You can use hydrocortisone cream and ice to treat these reactions if they occur.     3. Please send MTM a mychart if you receive any other letters from [facility] informing you of needing to change to a different adalimumab biosimilar.    Follow-up: {[email or web address]}    SUBJECTIVE/OBJECTIVE:                          Kody Pereira is a 68 year old male seen for an initial visit. He was referred to me from Steve Pandey MD.      Reason for visit: Received letter from insurance that adalimumab biosimilars are now covered and he would like to switch.    Allergies/ADRs:  Reviewed in chart  Past Medical History: Reviewed in chart  Tobacco: He reports that he quit smoking about 44 years ago. His smoking use included cigarettes. He started smoking about 45 years ago. He has a 0.5 pack-year smoking history. He has never used smokeless tobacco.  Alcohol: 7-9 beverages / week  Social: Retired physician    Medication Adherence/Access: no issues reported    Psoriatic Arthritis:   Humira 40 mg every 14 days  Triamcinolone 0.1% cream twice daily as needed   Hydrocortisone 1% cream twice daily as needed     Reports he is doing very well on Humira - no joint or skin symptoms currently. No side effects noted. Notes he got a letter from [facility] noting that adalimumab biosimilars are now covered. Would like to switch to the covered biosimilar today.    Liver Function Studies -   Recent Labs   Lab Test 04/01/24 0752   PROTTOTAL 7.1   ALBUMIN 4.6   BILITOTAL 0.4   ALKPHOS 62   AST 23   ALT 16     CBC RESULTS:   Recent Labs   Lab Test 04/01/24 0752   WBC 6.1   RBC 4.32*   HGB 12.8*   HCT 38.7*   MCV 90   MCH 29.6   MCHC 33.1   RDW 12.3        Hyperlipidemia:   Atorvastatin 10 mg daily  Patient reports no current medication side effects.    Recent Labs   Lab Test 04/01/24  0752 04/12/23  0832   CHOL 151 138   HDL 61 60   LDL 79 66   TRIG 53 60     Hypertension/Afib:   Losartan 100 mg nightly  Eliquis 5 mg twice daily     Pt reports no medication side effects or concerns.    BP Readings from Last 3 Encounters:   04/01/24 (!) 145/94   10/19/23 (!) 149/90   08/17/23 125/80     Today's Vitals: There were no vitals taken for this visit.  ----------------    I spent 15 minutes with this patient today. All changes were made via collaborative practice agreement with Steve Pandey MD. A copy of the visit note was provided to the patient's provider(s).    A summary of these recommendations was sent via [employer].    Raeann Villalobos, PharmD  Medication Therapy Management Pharmacist  [facility]  Rheumatology Clinic  Phone: [phone]    Telemedicine Visit Details  Type of service:  Video Conference via [employer]  Start Time: 8:00 AM  End Time: 8:15 AM     Medication Therapy Recommendations  Psoriatic arthritis (H)    Current Medication: adalimumab (HUMIRA *CF*) 40 MG/0.4ML prefilled syringe kit (Discontinued)   Rationale: Patient prefers not to take - Adherence - Adherence   Recommendation: Change Medication - Hadlima 40 MG/0.4ML Sosy - 40 mg every 14 days   Status: Accepted per CPA                  Again, thank you for allowing me to participate in the care of your patient.      Sincerely,    ELLA MARTINEZ, [facility]

## 2024-08-22 NOTE — PATIENT INSTRUCTIONS
"Recommendations from today's MTM visit:                                                       1. We are working on obtaining insurance coverage for Hadlima (adalimumab biosimilar). Once this is approved the pharmacy will call you to set up delivery so you can start 1 injection (40 mg) every 14 days.     2. A common side effect of Hadlima is injection site reactions (red, raised, itchy spot at injection site). You can use hydrocortisone cream and ice to treat these reactions if they occur.     3. Please send MTM a mychart if you receive any other letters from [facility] informing you of needing to change to a different adalimumab biosimilar.    Follow-up: Return in about 6 months (around 2/22/2025) for MTM Pharmacist Visit.    It was great speaking with you today.  I value your experience and would be very thankful for your time in providing feedback in our clinic survey. In the next few days, you may receive an email or text message from [employer] with a link to a survey related to your  clinical pharmacist.\"     To schedule another MTM appointment, please call the clinic directly or you may call the MTM scheduling line at [phone].    My Clinical Pharmacist's contact information:                                                      Please feel free to contact me with any questions or concerns you have.      Raeann Villalobos, PharmD  Medication Therapy Management Pharmacist  [facility]  Phone: [phone]     "

## 2024-08-25 NOTE — PROGRESS NOTES
ELECTROPHYSIOLOGY CLINIC VISIT    Assessment/Recommendations   Assessment/Plan:    Mr. Augie Pereira is a 68 year old male who has a medical history significant for AF s/p DCCVs, s/p PVI 2014 HTN, HLD, dilated ascending aorta 4.4 cm, psoriatic arthritis, and pulmonary nodules.     Paroxysmal Atrial Fibrillation:   I had a long discussion with the patient about AF, including the natural history of the disease, risk of embolic events, role of antithrombotic therapy, role of rate control therapy, the role of antiarrhythmic medications, and the role of an ablation. We discussed the AFFIRM trial.  1. Stroke Prophylaxis: CHADSVASC +age, +HTN 2, corresponding to a 2.2% annual stroke / systemic embolism event rate. Indicating need for chronic oral anticoagulation. He is appropriately on Eliquis. No bleeding issues.   2. Rate Control: Not currently requiring AVN agent.   3. Rhythm Control: Cardioversion, Antiarrhythmics and/or ablation are options for rhythm control. He had multiple DCCVs with recurrences and then had PVI in 2014. He has had a few isolated recurrences that were up to 15 hours, but self terminated. No episodes over last year.   4. Risk Factor Management: Statin, BP control, and TREY evaluation as indicated.     Dilated Aorta:   Has been stable. Will get updated echo at next follow-up to monitor aorta size  Tight BP and HR control    Follow up in 1 year with an echo.        History of Present Illness/Subjective    Mr. Prince Pereira is a 68 year old male who comes in today for EP follow-up of PAF.    Mr. Augie Pereira is a 68 year old male who has a medical history significant for AF s/p DCCVs, s/p PVI 2014 HTN, HLD, dilated ascending aorta 4.4 cm, psoriatic arthritis, and pulmonary nodules.     He has a history of AF for over 25 years. His first episode was in 1987. He had multiple DCCVs. He had recurrent persistent AF and had a PVI with Dr. Rodriguez in 2/13/2014. He had prolonged hospital  stay due to femoral hematoma. He had recurrent AF in 2017, 5/2020, 7/2021, and 8/1/23 that self terminated lasted up to 15 hours. An echo from 10/2023 showed LVEF 60-65%, normal RV size/function, and no significant valvular abnormalities. A coronary CTA from 4/2022 showed mild non-obstructive CAD and dilated proximal ascending aorta up to 4.4 cm.     He presents today for follow up. He reports feeling well. He denies chest discomfort, palpitations, abdominal fullness/bloating or peripheral edema, shortness of breath, paroxysmal nocturnal dyspnea, orthopnea, lightheadedness, dizziness, pre-syncope, or syncope. Presenting 12 lead ECG shows SR with PAC Vent Rate 82 bpm,  ms, QRS 88 ms, QTc 427 ms. Current cardiac medications include: Lipitor, Losartan, and Eliquis.         I have reviewed and updated the patient's Past Medical History, Social History, Family History and Medication List.     Cardiographics (Personally Reviewed) :   10/19/23 Echo:   Interpretation Summary  Left ventricular size, wall motion and function are normal. The ejection  fraction is 60-65%.  Right ventricular function, chamber size, wall motion, and thickness are  normal.  No significant valvular abnormalities present.  The ascending aorta is mildly dilated, measuring 4.2 cm at the ascending  aorta.  Compared with previous study of 10/05/2022, there have been no significant  changes. Specifically, ascending aorta dilation is stable at 4.2 cm.    4/4/22 CTA Coronary:  IMPRESSION:  1.   Mild nonobstructive coronary artery disease with mild proximal  right coronary artery stenosis.  2.   Dilated proximal ascending aorta measuring 4.4 cm x 4 cm. Mildly  dilated aortic root measuring 3.8 cm.  3.  Total Agatston score 39.1 placing the patient in the 36 percentile  when compared to age and gender matched control group.  4.  Please review Radiology report for incidental noncardiac findings  that will follow separately.       Physical Examination    BP (!) 141/86 (BP Location: Right arm, Patient Position: Chair, Cuff Size: Adult Regular)   Pulse 74   Wt 73.9 kg (163 lb)   SpO2 97%   BMI 23.39 kg/m    Wt Readings from Last 3 Encounters:   04/01/24 74.5 kg (164 lb 4.8 oz)   10/19/23 72.6 kg (160 lb 1.6 oz)   08/17/23 74.2 kg (163 lb 9.6 oz)     CONSITUTIONAL: no acute distress  HEENT: no icterus, no redness or discharge, neck supple  CV: no visible edema of visualized extremities. No JVD.   RESPIRATORY: respirations nonlabored, no cough  NEURO: AA&Ox3, speech fluent/appropriate, motor grossly nonfocal  PSYCH: cooperative, affect appropriate  DERM: no rashes on visualized face/neck/upper extremities         Medications  Allergies   Current Outpatient Medications   Medication Sig Dispense Refill    adalimumab-bwwd (HADLIMA) 40 MG/0.4ML prefilled syringe Inject 0.4 mLs (40 mg) subcutaneously every 14 days. 2.4 mL 1    apixaban ANTICOAGULANT (ELIQUIS) 5 MG tablet Take 1 tablet (5 mg) by mouth 2 times daily 180 tablet 3    atorvastatin (LIPITOR) 10 MG tablet Take 1 tablet (10 mg) by mouth daily 90 tablet 3    hydrocortisone (CORTAID) 1 % external cream Apply topically 2 times daily 120 g 4    losartan (COZAAR) 100 MG tablet Take 1 tablet (100 mg) by mouth at bedtime 90 tablet 3    triamcinolone (KENALOG) 0.1 % external cream Apply topically 2 times daily 454 g 3    Allergies   Allergen Reactions    Other [No Clinical Screening - See Comments] Rash     Cardioversion pads cause rash.         Lab Results (Personally Reviewed)    Chemistry/lipid CBC Cardiac Enzymes/BNP/TSH/INR   Lab Results   Component Value Date    BUN 11.9 04/01/2024     04/01/2024    CO2 24 04/01/2024     Creatinine   Date Value Ref Range Status   04/01/2024 0.86 0.67 - 1.17 mg/dL Final   07/01/2021 1.10 0.66 - 1.25 mg/dL Final       Lab Results   Component Value Date    CHOL 151 04/01/2024    HDL 61 04/01/2024    LDL 79 04/01/2024      Lab Results   Component Value Date    WBC 6.1  04/01/2024    HGB 12.8 (L) 04/01/2024    HCT 38.7 (L) 04/01/2024    MCV 90 04/01/2024     04/01/2024    Lab Results   Component Value Date    TSH 2.35 10/06/2011    INR 1.07 07/13/2015        The patient states understanding and is agreeable with the plan.   ARASH Kulkarni CNP  Electrophysiology Consult Service  Securely message with [employer]   Text page via [facility] Paging/Directory

## 2024-08-28 ENCOUNTER — OFFICE VISIT (OUTPATIENT)
Dept: CARDIOLOGY | Facility: CLINIC | Age: 69
End: 2024-08-28
Attending: INTERNAL MEDICINE
Payer: COMMERCIAL

## 2024-08-28 VITALS
HEART RATE: 74 BPM | DIASTOLIC BLOOD PRESSURE: 86 MMHG | WEIGHT: 163 LBS | OXYGEN SATURATION: 97 % | BODY MASS INDEX: 23.39 KG/M2 | SYSTOLIC BLOOD PRESSURE: 141 MMHG

## 2024-08-28 DIAGNOSIS — I48.0 PAROXYSMAL ATRIAL FIBRILLATION (H): ICD-10-CM

## 2024-08-28 PROCEDURE — 93010 ELECTROCARDIOGRAM REPORT: CPT | Performed by: INTERNAL MEDICINE

## 2024-08-28 PROCEDURE — G0463 HOSPITAL OUTPT CLINIC VISIT: HCPCS | Performed by: NURSE PRACTITIONER

## 2024-08-28 PROCEDURE — 99214 OFFICE O/P EST MOD 30 MIN: CPT | Performed by: NURSE PRACTITIONER

## 2024-08-28 PROCEDURE — 93005 ELECTROCARDIOGRAM TRACING: CPT

## 2024-08-28 ASSESSMENT — PAIN SCALES - GENERAL: PAINLEVEL: NO PAIN (0)

## 2024-08-28 NOTE — LETTER
8/28/2024      RE: Prince Pereira  [address]  [place] [address]       Dear Colleague,    Thank you for the opportunity to participate in the care of your patient, Prince Pereira, at the [facility] [place] at [facility]. Please see a copy of my visit note below.        ELECTROPHYSIOLOGY CLINIC VISIT    Assessment/Recommendations   Assessment/Plan:    Mr. Augie Pereira is a 68 year old male who has a medical history significant for AF s/p DCCVs, s/p PVI 2014 HTN, HLD, dilated ascending aorta 4.4 cm, psoriatic arthritis, and pulmonary nodules.     Paroxysmal Atrial Fibrillation:   I had a long discussion with the patient about AF, including the natural history of the disease, risk of embolic events, role of antithrombotic therapy, role of rate control therapy, the role of antiarrhythmic medications, and the role of an ablation. We discussed the AFFIRM trial.  1. Stroke Prophylaxis: CHADSVASC +age, +HTN 2, corresponding to a 2.2% annual stroke / systemic embolism event rate. Indicating need for chronic oral anticoagulation. He is appropriately on Eliquis. No bleeding issues.   2. Rate Control: Not currently requiring AVN agent.   3. Rhythm Control: Cardioversion, Antiarrhythmics and/or ablation are options for rhythm control. He had multiple DCCVs with recurrences and then had PVI in 2014. He has had a few isolated recurrences that were up to 15 hours, but self terminated. No episodes over last year.   4. Risk Factor Management: Statin, BP control, and TREY evaluation as indicated.     Dilated Aorta:   Has been stable. Will get updated echo at next follow-up to monitor aorta size  Tight BP and HR control    Follow up in 1 year with an echo.        History of Present Illness/Subjective    Mr. Prince Pereira is a 68 year old male who comes in today for EP follow-up of PAF.    Mr. Augie Pereira is a 68 year  old male who has a medical history significant for AF s/p DCCVs, s/p PVI 2014 HTN, HLD, dilated ascending aorta 4.4 cm, psoriatic arthritis, and pulmonary nodules.     He has a history of AF for over 25 years. His first episode was in 1987. He had multiple DCCVs. He had recurrent persistent AF and had a PVI with Dr. Rodriguez in 2/13/2014. He had prolonged hospital stay due to femoral hematoma. He had recurrent AF in 2017, 5/2020, 7/2021, and 8/1/23 that self terminated lasted up to 15 hours. An echo from 10/2023 showed LVEF 60-65%, normal RV size/function, and no significant valvular abnormalities. A coronary CTA from 4/2022 showed mild non-obstructive CAD and dilated proximal ascending aorta up to 4.4 cm.     He presents today for follow up. He reports feeling well. He denies chest discomfort, palpitations, abdominal fullness/bloating or peripheral edema, shortness of breath, paroxysmal nocturnal dyspnea, orthopnea, lightheadedness, dizziness, pre-syncope, or syncope. Presenting 12 lead ECG shows SR with PAC Vent Rate 82 bpm,  ms, QRS 88 ms, QTc 427 ms. Current cardiac medications include: Lipitor, Losartan, and Eliquis.         I have reviewed and updated the patient's Past Medical History, Social History, Family History and Medication List.     Cardiographics (Personally Reviewed) :   10/19/23 Echo:   Interpretation Summary  Left ventricular size, wall motion and function are normal. The ejection  fraction is 60-65%.  Right ventricular function, chamber size, wall motion, and thickness are  normal.  No significant valvular abnormalities present.  The ascending aorta is mildly dilated, measuring 4.2 cm at the ascending  aorta.  Compared with previous study of 10/05/2022, there have been no significant  changes. Specifically, ascending aorta dilation is stable at 4.2 cm.    4/4/22 CTA Coronary:  IMPRESSION:  1.   Mild nonobstructive coronary artery disease with mild proximal  right coronary artery stenosis.  2.    Dilated proximal ascending aorta measuring 4.4 cm x 4 cm. Mildly  dilated aortic root measuring 3.8 cm.  3.  Total Agatston score 39.1 placing the patient in the 36 percentile  when compared to age and gender matched control group.  4.  Please review Radiology report for incidental noncardiac findings  that will follow separately.       Physical Examination   BP (!) 141/86 (BP Location: Right arm, Patient Position: Chair, Cuff Size: Adult Regular)   Pulse 74   Wt 73.9 kg (163 lb)   SpO2 97%   BMI 23.39 kg/m    Wt Readings from Last 3 Encounters:   04/01/24 74.5 kg (164 lb 4.8 oz)   10/19/23 72.6 kg (160 lb 1.6 oz)   08/17/23 74.2 kg (163 lb 9.6 oz)     CONSITUTIONAL: no acute distress  HEENT: no icterus, no redness or discharge, neck supple  CV: no visible edema of visualized extremities. No JVD.   RESPIRATORY: respirations nonlabored, no cough  NEURO: AA&Ox3, speech fluent/appropriate, motor grossly nonfocal  PSYCH: cooperative, affect appropriate  DERM: no rashes on visualized face/neck/upper extremities         Medications  Allergies   Current Outpatient Medications   Medication Sig Dispense Refill     adalimumab-bwwd (HADLIMA) 40 MG/0.4ML prefilled syringe Inject 0.4 mLs (40 mg) subcutaneously every 14 days. 2.4 mL 1     apixaban ANTICOAGULANT (ELIQUIS) 5 MG tablet Take 1 tablet (5 mg) by mouth 2 times daily 180 tablet 3     atorvastatin (LIPITOR) 10 MG tablet Take 1 tablet (10 mg) by mouth daily 90 tablet 3     hydrocortisone (CORTAID) 1 % external cream Apply topically 2 times daily 120 g 4     losartan (COZAAR) 100 MG tablet Take 1 tablet (100 mg) by mouth at bedtime 90 tablet 3     triamcinolone (KENALOG) 0.1 % external cream Apply topically 2 times daily 454 g 3    Allergies   Allergen Reactions     Other [No Clinical Screening - See Comments] Rash     Cardioversion pads cause rash.         Lab Results (Personally Reviewed)    Chemistry/lipid CBC Cardiac Enzymes/BNP/TSH/INR   Lab Results   Component  Value Date    BUN 11.9 04/01/2024     04/01/2024    CO2 24 04/01/2024     Creatinine   Date Value Ref Range Status   04/01/2024 0.86 0.67 - 1.17 mg/dL Final   07/01/2021 1.10 0.66 - 1.25 mg/dL Final       Lab Results   Component Value Date    CHOL 151 04/01/2024    HDL 61 04/01/2024    LDL 79 04/01/2024      Lab Results   Component Value Date    WBC 6.1 04/01/2024    HGB 12.8 (L) 04/01/2024    HCT 38.7 (L) 04/01/2024    MCV 90 04/01/2024     04/01/2024    Lab Results   Component Value Date    TSH 2.35 10/06/2011    INR 1.07 07/13/2015        The patient states understanding and is agreeable with the plan.   ARASH Kulkarni CNP  Electrophysiology Consult Service  Securely message with [employer]   Text page via [facility] Paging/Directory           Please do not hesitate to contact me if you have any questions/concerns.     Sincerely,     ARASH Fatima CNP

## 2024-08-28 NOTE — NURSING NOTE
Chief Complaint   Patient presents with    Follow Up     1 yr follow up afib, ct pt  Meds- statin, losartan, eliquis         Vitals were taken and medications reconciled. EKG was performed.    Shane Corbett, EMT  12:45 PM

## 2024-08-29 LAB
ATRIAL RATE - MUSE: 82 BPM
DIASTOLIC BLOOD PRESSURE - MUSE: NORMAL MMHG
INTERPRETATION ECG - MUSE: NORMAL
P AXIS - MUSE: 64 DEGREES
PR INTERVAL - MUSE: 164 MS
QRS DURATION - MUSE: 88 MS
QT - MUSE: 366 MS
QTC - MUSE: 427 MS
R AXIS - MUSE: 12 DEGREES
SYSTOLIC BLOOD PRESSURE - MUSE: NORMAL MMHG
T AXIS - MUSE: -6 DEGREES
VENTRICULAR RATE- MUSE: 82 BPM

## 2024-09-19 ENCOUNTER — MYC MEDICAL ADVICE (OUTPATIENT)
Dept: INTERNAL MEDICINE | Facility: CLINIC | Age: 69
End: 2024-09-19
Payer: COMMERCIAL

## 2024-10-28 ENCOUNTER — OFFICE VISIT (OUTPATIENT)
Dept: INTERNAL MEDICINE | Facility: CLINIC | Age: 69
End: 2024-10-28
Payer: COMMERCIAL

## 2024-10-28 ENCOUNTER — ANCILLARY PROCEDURE (OUTPATIENT)
Dept: GENERAL RADIOLOGY | Facility: CLINIC | Age: 69
End: 2024-10-28
Attending: INTERNAL MEDICINE
Payer: COMMERCIAL

## 2024-10-28 VITALS
HEART RATE: 73 BPM | BODY MASS INDEX: 23.37 KG/M2 | WEIGHT: 162.9 LBS | OXYGEN SATURATION: 99 % | TEMPERATURE: 97.5 F | DIASTOLIC BLOOD PRESSURE: 79 MMHG | SYSTOLIC BLOOD PRESSURE: 137 MMHG

## 2024-10-28 DIAGNOSIS — M54.50 BILATERAL LOW BACK PAIN WITHOUT SCIATICA, UNSPECIFIED CHRONICITY: Primary | ICD-10-CM

## 2024-10-28 PROCEDURE — 99213 OFFICE O/P EST LOW 20 MIN: CPT | Performed by: INTERNAL MEDICINE

## 2024-10-28 PROCEDURE — 72100 X-RAY EXAM L-S SPINE 2/3 VWS: CPT | Performed by: STUDENT IN AN ORGANIZED HEALTH CARE EDUCATION/TRAINING PROGRAM

## 2024-10-28 PROCEDURE — 72170 X-RAY EXAM OF PELVIS: CPT | Mod: GC | Performed by: RADIOLOGY

## 2024-10-28 NOTE — PROGRESS NOTES
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  There is no preceding injury or trauma.  He noticed gradual onset of pain stiffness and some burning in the low back pelvic area aggravated by sitting aggravated by bending and twisting.  He would be aware of it when he wakes up at night but is not certain that it was waking him up at night.  It is more stiff and sore in the morning seems to improve as the day goes on.  Pain will radiate to the area of the trochanters and can alternate from side-to-side.  There is no pain radiating down the leg and there is no bowel or bladder disturbance in association with this.  Otherwise he has a skin nodule near the bridge of his nose on the left that he was concerned about.  Past Medical History:   Diagnosis Date    Atrial fibrillation (H)     paroxysmal with ablation 2014    Family history of prostate cancer     father  age 60    Herpes zoster 2013    Hypertension     Almendarez's neuroma of right foot     Psoriasis     psoriatic arthritis     Pulmonary nodules 2014    multiple noncalcified up to 6 mm needs f/u     Past Surgical History:   Procedure Laterality Date    ANESTHESIA CARDIOVERSION  10/6/2011    Procedure:ANESTHESIA CARDIOVERSION; CARDIOVERSION; Surgeon:GENERIC ANESTHESIA PROVIDER; Location:[facility]    ANESTHESIA CARDIOVERSION  2/10/2012    Procedure:ANESTHESIA CARDIOVERSION; CARDIOVERSION; Surgeon:GENERIC ANESTHESIA PROVIDER; [facility]    ANESTHESIA CARDIOVERSION  2013    Procedure: ANESTHESIA CARDIOVERSION;  Cardioversion;  Surgeon: Provider, Generic Anesthesia;  Location: [facility]    ANESTHESIA CARDIOVERSION  2013    Procedure: ANESTHESIA CARDIOVERSION;  Cardioversion;  Surgeon: Provider, Generic Anesthesia;  Location: [facility]    CATARACT IOL, RT/LT Left 2015    COLONOSCOPY      ENT SURGERY      tonsillectomy    HERNIORRHAPHY INGUINAL Right 7/15/2015    Procedure: HERNIORRHAPHY  INGUINAL;  Surgeon: Bk Watts MD;  Location: [facility]    Left Atrial Wide Area Circumferential radiofrequency ablation  2014      for atrial fib    PHACOEMULSIFICATION CLEAR CORNEA WITH STANDARD INTRAOCULAR LENS IMPLANT Right 2015    Procedure: PHACOEMULSIFICATION CLEAR CORNEA WITH STANDARD INTRAOCULAR LENS IMPLANT;  Surgeon: Petr Moyer MD;  Location: [facility]     Family History   Problem Relation Age of Onset    C.A.D. Maternal Grandfather          age 63    Hypertension Maternal Grandfather     Hypertension Mother     Alzheimer Disease Mother     Prostate Cancer Father          age 60    Alzheimer Disease Maternal Grandmother     Depression Daughter     Anxiety Disorder Daughter     Glaucoma No family hx of     Macular Degeneration No family hx of          Exam:  /79 (BP Location: Right arm, Patient Position: Sitting, Cuff Size: Adult Regular)   Pulse 73   Temp 97.5  F (36.4  C) (Oral)   Wt 73.9 kg (162 lb 14.4 oz)   SpO2 99%   BMI 23.37 kg/m    162 lbs 14.4 oz  The patient is alert, oriented with a clear sensorium.   Skin shows benign keratotic unpigmented lesion on the bridge of his nose to the left   head is normocephalic and atraumatic.    Back shows full range of motion some pain with forward flexion and with left lateral bending.  Some tenderness over the SI joints bilaterally.  No pain with extension or 1 leg extension.  Straight leg raising is negative both seated and lying.  His hips show full range of motion and no pain.    X-rays were reviewed showing  Results for orders placed or performed in visit on 10/28/24   XR Pelvis 1/2 Views     Status: None    Narrative    AP view pelvis radiograph(s) 10/28/2024 8:20 AM    History: Bilateral low back pain without sciatica, unspecified  chronicity    Comparison: None    Findings:    AP view of the pelvis were obtained.     No acute osseous abnormality.    Mild degenerative change of hips.    Sacrum and  innominate bones are partially obscured by overlying bowel  gas/fecal content.    Pelvic phlebolith.      Impression    Impression:  1. No acute osseous abnormality.  2. Mild degenerative change of the hips without substantial joint  space loss.    I have personally reviewed the examination and initial interpretation  and I agree with the findings.    MARIETTA CASTAÑEDA MD (Joe)         SYSTEM ID:  [id number]   XR Lumbar Spine 2/3 Views     Status: None    Narrative    Exam: XR LUMBAR SPINE 2/3 VIEWS, 10/28/2024 8:20 AM    Indication: Bilateral low back pain without sciatica, unspecified  chronicity    Comparison: 9/9/2014    Findings:   AP and lateral radiographs of the lumbar spine were obtained, which  demonstrate moderate degenerative changes, with notable hypertrophic  facet arthropathy beginning at L3-4 extending through the L5-S1  articulation. Multilevel small Schmorl's node endplate deformities.  There is mild to moderate intervertebral disc height loss at L4-5 and  L5-S1.    There is no acute fracture or subluxation suspected. Normal spinal  alignment.    Suspected at least moderate degree of neural foraminal stenosis at  L5-S1, and likely at L4-5. No suspicious osseous lesion.    There is a mild to moderate colonic stool burden, no concerning  radiographic abnormality within the included abdomen or pelvis. Mild  to moderate degenerative changes noted in the included hips.      Impression    Impression:   Lumbar spondylosis, with degenerative changes as detailed. Most  significantly there is hypertrophic facet arthropathy which presumably  lends to a certain degree of neural foraminal stenosis particularly at  L4-5 and L5-S1. There are also degenerative endplate changes at  multiple levels. No acute fracture or significant subluxation noted.     COCO PRYOR DO         SYSTEM ID:  [id number]         ASSESSMENT  1 Lumbar degenerative arthritis  2 Hypertension well controlled   3 Hyperlipidemia on  atorvastatin  4 history psoriatic arthritis on Humira  5 Family history prostate cancer  6 History AF S/P ablation  7 BPH  8 IGT  9 Dilated ascending aorta followed by cardiology    Plan  Will have him do a low back exercise program and refer to physical therapy for core strengthening.  Will x-ray to see the extent of the degenerative change.  Plan follow-up routine next year unless symptoms do not improve or resolve.    This note was completed using Dragon voice recognition software.      Brendan Ndiaye MD  General Internal Medicine  [facility]  [phone]

## 2024-11-27 NOTE — PROGRESS NOTES
Kody is a 69 year old who is being evaluated via a billable video visit.    How would you like to obtain your AVS? MyChart  If the video visit is dropped, the invitation should be resent by: Text to cell phone: [phone]  Will anyone else be joining your video visit? No    Video-Visit Details    Type of service:  Video Visit   Video End Time:2:17 PM  Originating Location (pt. Location): Home    Distant Location (provider location):  On-site  Platform used for Video Visit: [facility]  Rheumatology Clinic  Steve Pandey MD  2024     Name: Prince Pereira  MRN: [id number]  Age: 69 year old  : 1955  Referring provider: Brendan Ndiaye     Assessment and Plan:  # Psoriatic arthritis (diagnosed before ; less than 5% body surface area psoriasis: [id number]; RX Enbrel since ):   # Chronic low back pain, low grade:     The patient relates absence of inflammatory joint symptoms. He notes stable, occasional focal psoriasis, responsive to topical therapy. Video exam shows no psoriasis on head and neck.    Data: Laboratory evaluation  showed comprehensive metabolic panel, and CBC normal except for mild decreased hgb.  Imaging: Plain x-ray of the pelvis showed lumbar spondylosis and facet joint arthropathy with presumed foraminal stenosis.  Mild degenerative hip arthrosis.    Discussion: Psoriatic arthritis remains completely suppressed on adalimumab biosimilar monotherapy. Reliable return of symptoms with even short delay in etanercept dosing suggests the presence of ongoing inflammatory disease, however.  I recommend continuing adalimumab every other week. He may use ibuprofen 400 to 600 mg as needed for occasional joint pain. Continue PRN topical therapy for mild psoriasis.  Annual monitoring of creatinine and transaminases is suggested.    Return to clinic in one year.     Orders Placed This Encounter   Procedures    Comprehensive metabolic panel     Standing  "Status:   Future     Standing Expiration Date:   6/4/2025    CBC with platelets     Standing Status:   Future     Standing Expiration Date:   6/4/2025     Steve Pandey MD  Staff Rheumatologist, [facility]    On the day of the encounter, a total of 30 minutes was spent in chart review, and in counseling and coordination of care, regarding the patient's complex medical problem of psoriatic arthritis, high risk medication.    The longitudinal plan of care for the diagnosis(es)/condition(s) as documented were addressed during this visit. Due to the added complexity in care, I will continue to support Kody in the subsequent management and with ongoing continuity of care.        Follow-up: No follow-ups on file.     HPI:   Prince Chang has a history of psoriatic arthritis, psoriasis, and atrial fibrillation status post ablation who presents for follow up.  I last saw the patient in August 2023, when psoriatic arthritis was judged quiescent.  I recommended continuing etanercept monotherapy, and using ibuprofen as needed occasional joint pain.    Interval history December 3, 2024    In 8-2024, he developed \"nuisance\" low back pain. He saw Dr. Ndiaye, and \"69 year old back\" was the diagnosis.  Pain is worst with sitting. Pain is not better or worse in the mornings. He occasionallly loses sleep. He ordered a new mattress; he has not used any over the counter remedies. He is starting PTx    R shoulder occasionally gives some pain, worse with overuse or repetitive use or if he slleeps on R side.  He has mild discomfort in the fingers, associated with OA in DIPs    He has changed over to adlimumab biosimilar as of 8-2024 (switched from enbrel to adlimumab 2023). No problems, but he has had mild recurrence of multiple foci of oral ulcers (lifelong issue).    He has had no recurrence of psoriasis or nail changes other than occasional cracking and toenail fungus.      Interval history August 17, 2023    Patient " "was seen in dermatology on July 31, 2023.  Impression was of probable nummular dermatitis.  Differential diagnosis includes paradoxical psoriasis from TNF alpha inhibitor which could be just idiopathic or paradoxical eczematous reaction from Humira.  Recommendation was to increase use of moisturization and triamcinolone cream twice daily as needed.    Today, he reports that mild achiness and swelling in multiple fingers of both hands that he experienced several months ago is no longer a problem.  He has no morning stiffness.  Activities of daily living are not disrupted by joint symptoms.  He recently has performed heavy work with a maul and chainsaw at his cabin without difficulty.    Dime sized, intensely itchy erythematous patches on his legs have resolved with use of triamcinolone and hydrocortisone cream after his visit with dermatology.  He has persistent \"balloon\" nails with roughness, and notes persistent toenail fungus, but no recurrence of psoriasis in scalp extensor surfaces.      He notes several little cancre sores in the past week; he is not sure whether humira is different from enbrel wrt control of these ulcers. Sores usually last 7-10 days. Has never tried anbusol    Has had 2 episodes of Afib, last several weeks ago. Cards has recommended anticoagulation; he started eliquis.    Interval history January 28, 2022    Health is good. He still notes onset of chancre sores if he delays the enbrel by a few days. He has noted this once in the past 6 months. But no joint stiffness or pain or swelling. No persistent psoriasis in his lower legs or behind his ears or knees. No use of NSAIDs.  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    Nothing as severe as at the onset of psoriatic arthriitis > 15 years ago.    He has continued enbrel 50 mg q 10 days.   He is active, playing tennis every few weeks, working with a sit-stand desk, but less aerobic activity than previously.    He had 3 " doses of COVID19 vax, last one in early Fall.    Interval history 02-:    Health is good. He started lipitor for increased cholesterol per Dr. Ndiaye.  He is active, playing tennis once a week, working with a sit-stand desk, but less aerobic activity than previously. 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 Nothing as severe as at the onset of psoriatic arthriitis.  He has continued enbrel 50 mg q 10 days.   He still notes onset of chancre sores if he delays the enbrel by a few days. But no joint stiffness or pain or swelling. No persistent psoriasis in his lower legs or behind his ears or knees. No use of NSAIDs.    Taking losartan and statin. Will get an Echo on 2-2022 to followup A-fib history. He had no episodes of fib on a 2 week monitoring, but he may need anticoagulation anyway.    History 2-2020:     Background:  Very occasional psoriasis back in the 1970s, but has very rare skin involvement in general with the exception of a patch that he applied hydrocortisone to behind his ear and behind his knee. The patient states that his joint symptoms started in 1999 with plantar fasciitis that progressed to hand, feet, and lateral hip pain. He states he started with Naproxen, moved on to Methotrexate topical, then oral methotrexate, and finally Enbrel. He states the Methotrexate did not really help his symptoms. However, he reports that the Enbrel has nearly resolved his symptoms and also seems to improve his canker sores on the buccal mucosa stating this is his warning sign to take Enbrel. He states he takes Enbrel every 7 to 10 days, and if he stretches beyond 10 days between Enbrel he notes returning joint pain. The patient denies red or dry eyes. He also denies chest discomfort though he does report mild congestion at times. He denies any injection site reactions. He denies limitations in physical activity. He notes nail changes in his toes likely due to fungus. The patient  "reports that he takes Naproxen as needed for unrelated pain, but does not need it for his joints.     HISTORY CARRIED FORWARD:  To review, he was diagnosed with psoriatic arthritis 14+ years ago, and it has been well controlled with Enbrel  He manages to stretch his injections to once every 8-10 days, and he notes that the development of canker sores are reproducibly an indication to take his medication. He has not noticed any inflammation or irritation at the injection site. He is not experiencing any active inflammation or pain. The patient states that his range of motion is \"almost back to normal,\" with his continued use of Enbrel. He has some intermittent shoulder pain on R that is rotator cuff related, exacerbated by tennis.  Additionally, the patient has a past medical history of atrial fibrillation since 1988, that required recent cardioversion in February; he had a second episode days later that resolved with Flecanide. He was given flecainide to use if his symptoms return, but has not needed to use the medication to date. The patient currently takes metoprolol XL and aspirin for his atrial fibrillation. He also occasionally experiences reflux, and this has been managed with omeprazole once a day.   Overall, the patient has been quite pleased with his medication, and plans to continue using Enbrel to manage his psoriatic arthritis. He has had only a small patch of psoriasis on L shin now resolved almost with Triamcinolone.  He had a bout of herpes zoster in February 2013 that resolved. He had Valtrex and has no post herpetic pain. We discussed in the past the lack of consensus about risk vs. Benefit of Zostavax in patients on anti TNF therapy. He had no questions or concerns.   He saw Nina Smith late 2014 and then Dr. Pandey emergently in late December for question of possible GCA. CRP and ESR were normal. He did have a TA bx by Dr. Ramon Lagunas which was negative. His symptoms  gradually abated and " were absent a year ago.  He had a hernia repair in 2015 with pre op physical by Dr. Ndiaye, and interval followup by Dr. Hawthorne regarding prior stable pulmonary nodules and does not require followup.  Unfortunately he had a post op hematoma after surgery that opened and had to heal by secondary intention.  He was off Enbrel for about 8 weeks due to this. He did note right at the end of that slight return of hand stiffness and pain and slightly more psoriasis. This came back under excellent control after resuming and he is now close to asymptomatic taking the Enbrel on average every 10-14 days.     Review of Systems:   Pertinent items are noted in HPI or as below, remainder of complete ROS is negative.      No recent problems with hearing or vision. No swallowing problems.   No breathing difficulty, shortness of breath, coughing, or wheezing  No chest pain or palpitations  No heart burn, indigestion, abdominal pain, nausea, vomiting  No urination problems, no bloody, cloudy urine, no dysuria  No numbing, tingling, weakness  No headaches or confusion  No rashes. No easy bleeding or bruising.     +canker sores    Active Medications:   Current Outpatient Medications   Medication Sig Dispense Refill    adalimumab-bwwd (HADLIMA) 40 MG/0.4ML prefilled syringe Inject 0.4 mLs (40 mg) subcutaneously every 14 days. 2.4 mL 11    apixaban ANTICOAGULANT (ELIQUIS) 5 MG tablet Take 1 tablet (5 mg) by mouth 2 times daily. 180 tablet 3    atorvastatin (LIPITOR) 10 MG tablet Take 1 tablet (10 mg) by mouth daily 90 tablet 3    hydrocortisone (CORTAID) 1 % external cream Apply topically 2 times daily 120 g 4    losartan (COZAAR) 100 MG tablet Take 1 tablet (100 mg) by mouth at bedtime 90 tablet 3    triamcinolone (KENALOG) 0.1 % external cream Apply topically 2 times daily 454 g 3     No current facility-administered medications for this visit.           Allergies:   Other [no clinical screening - see comments]      Past Medical  History:  Atrial fibrillation   Herpes Zoster  Almendarez's neuroma of the right foot  Psoriasis   Psoriatic arthritis   Pulmonary nodules   Esophageal reflux  Enlargement of prostate benign      Past Surgical History:  Cardioversion 10/6/2011, 2/10/2012, 2/8/2013, 11/6/2013  Cataract IOL, right /left   Tonsillectomy  Colonoscopy  Herniorrhaphy inguinal right 7/15/2015  Left atrial wide area circumferential radiofrequency ablation 2/13/2014  Phacoemulsification clear cornea with standard intraocular lens implant 6/1/2015    Family History:   Maternal grandfather: coronary artery disease , hypertension   Mother: hypertension, alzheimer disease  Father: prostate cancer (age 60)      Social History:   Smoking Status: former smoker for 40 years   Smokeless Tobacco: Never Used  Alcohol Use: Yes   Occasional, 10 drink equivalents per week   Drug Use: No  PCP: Brendan Ndiaye   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      Physical Exam:   There were no vitals taken for this visit.   Wt Readings from Last 4 Encounters:   10/28/24 73.9 kg (162 lb 14.4 oz)   08/28/24 73.9 kg (163 lb)   04/01/24 74.5 kg (164 lb 4.8 oz)   10/19/23 72.6 kg (160 lb 1.6 oz)     Constitutional: Well-developed, appearing stated age; cooperative  Eyes: Normal EOM, PERRLA, vision, conjunctiva, sclera  ENT: Normal external ears, nose, hearing, lips, teeth, gums, throat. No mucous membrane lesions, normal saliva pool  Neck: No mass or thyroid enlargement  Resp: breathing unlabored  MS: No swelling MCP and PIP. Some angulation in bilateral 5th DIP in both hands. Dorsal medial aspect of left 1st DIP is slightly prominent; Excellent wrist, finger, elbow, shoulder range of motion bilaterally.   Skin: No nail pitting, alopecia, rash, nodules or lesions  Neuro: Normal cranial nerves  Psych: Normal judgement, orientation, memory, affect.     Laboratory:       Latest Ref Rng & Units 4/4/2022     9:44 AM 10/5/2022     9:42 AM 4/1/2024     7:52 AM   RHEUM  RESULTS   Albumin 3.5 - 5.2 g/dL  4.4  4.6    ALT 0 - 70 U/L  44  16    AST 0 - 45 U/L  38  23    Creatinine 0.67 - 1.17 mg/dL 0.84  0.84  0.86    GFR Estimate >60 mL/min/1.73m2 >90  >90  >90    Hematocrit 40.0 - 53.0 %   38.7    Hemoglobin 13.3 - 17.7 g/dL   12.8    WBC 4.0 - 11.0 10e3/uL   6.1    RBC Count 4.40 - 5.90 10e6/uL   4.32    RDW 10.0 - 15.0 %   12.3    MCHC 31.5 - 36.5 g/dL   33.1    MCV 78 - 100 fL   90    Platelet Count 150 - 450 10e3/uL   157

## 2024-12-03 ENCOUNTER — VIRTUAL VISIT (OUTPATIENT)
Dept: RHEUMATOLOGY | Facility: CLINIC | Age: 69
End: 2024-12-03
Payer: COMMERCIAL

## 2024-12-03 DIAGNOSIS — L40.50 PSORIATIC ARTHRITIS (H): ICD-10-CM

## 2024-12-03 PROCEDURE — 99214 OFFICE O/P EST MOD 30 MIN: CPT | Mod: 95 | Performed by: INTERNAL MEDICINE

## 2024-12-03 PROCEDURE — G2211 COMPLEX E/M VISIT ADD ON: HCPCS | Mod: 95 | Performed by: INTERNAL MEDICINE

## 2024-12-03 RX ORDER — ADALIMUMAB-BWWD 40 MG/.4ML
40 SOLUTION SUBCUTANEOUS
Qty: 2.4 ML | Refills: 11 | Status: SHIPPED | OUTPATIENT
Start: 2024-12-03

## 2024-12-03 NOTE — PATIENT INSTRUCTIONS
Diagnosis:  1.  Psoriatic arthritis: Both skin and joint inflammation are well controlled with humira, used only for a short time. I recommend no change in humira  2. Oral ulcers: make a trial of oral cream    Plan:  1.  Continue adalimumab biosimilar 40 mg subcu every other week.  Monitor for dosing cycle dependent joint and skin symptoms.  2.  Check liver function, kidney function, and complete blood count ~ once yearly.  3. Continue anbusol gel/ointment to painful ulcers prn.

## 2025-01-16 ENCOUNTER — THERAPY VISIT (OUTPATIENT)
Dept: PHYSICAL THERAPY | Facility: CLINIC | Age: 70
End: 2025-01-16
Payer: COMMERCIAL

## 2025-01-16 DIAGNOSIS — M54.50 BILATERAL LOW BACK PAIN WITHOUT SCIATICA, UNSPECIFIED CHRONICITY: Primary | ICD-10-CM

## 2025-02-04 ENCOUNTER — TELEPHONE (OUTPATIENT)
Dept: RHEUMATOLOGY | Facility: CLINIC | Age: 70
End: 2025-02-04
Payer: COMMERCIAL

## 2025-02-04 NOTE — TELEPHONE ENCOUNTER
PA Initiation    Medication: HADLIMA PUSHTOUCH 40 MG/0.4ML SC SOAJ  Insurance Company: [employer] - Phone [phone] Fax [phone]  Pharmacy Filling the Rx:    Filling Pharmacy Phone:    Filling Pharmacy Fax:    Start Date: 2/4/2025  [id number])        EUGENE Butt, [facility]  [facility]    [email or web address]     Phone: [phone]  Fax: [phone]

## 2025-02-06 NOTE — TELEPHONE ENCOUNTER
Prior Authorization Approval    Medication: HADLIMA PUSHTOUCH 40 MG/0.4ML SC SOAJ  Authorization Effective Date: 2/6/2025  Authorization Expiration Date: 2/5/2026  Approved Dose/Quantity: 2  Reference #:     Insurance Company: [employer] - Phone [phone] Fax [phone]  Expected CoPay: $    CoPay Card Available: No    Financial Assistance Needed: NA  Which Pharmacy is filling the prescription: [facility] - [place], [place] - [address]

## 2025-03-04 DIAGNOSIS — I10 HYPERTENSION, UNSPECIFIED TYPE: ICD-10-CM

## 2025-03-08 RX ORDER — LOSARTAN POTASSIUM 100 MG/1
100 TABLET ORAL
Qty: 90 TABLET | Refills: 1 | Status: SHIPPED | OUTPATIENT
Start: 2025-03-08

## 2025-03-09 NOTE — TELEPHONE ENCOUNTER
Last Written Prescription:  4/1/24  90 : 3  ----------------------  Last Visit Date: 10/28/24  Future Visit Date: 4/16/25  ----------------------    Refill decision: Medication refilled per  Medication Refill in Ambulatory Care  policy.  *The refill does match what is on file    Request from pharmacy:  Requested Prescriptions   Pending Prescriptions Disp Refills    losartan (COZAAR) 100 MG tablet [Pharmacy Med Name: Losartan Potassium Oral Tablet 100 MG] 90 tablet 0     Sig: TAKE ONE TABLET BY MOUTH AT BEDTIME       Angiotensin-II Receptors Failed - 3/8/2025 10:03 PM        Failed - Medication is active on med list and the sig matches. RN to manually verify dose and sig if red X/fail.     If the protocol passes (green check), you do not need to verify med dose and sig.    A prescription matches if they are the same clinical intention.    For Example: once daily and every morning are the same.    The protocol can not identify upper and lower case letters as matching and will fail.     For Example: Take 1 tablet (50 mg) by mouth daily     TAKE 1 TABLET (50 MG) BY MOUTH DAILY    For all fails (red x), verify dose and sig.    If the refill does match what is on file, the RN can still proceed to approve the refill request.       If they do not match, route to the appropriate provider.             Passed - Most recent blood pressure under 140/90 in past 12 months     BP Readings from Last 3 Encounters:   10/28/24 137/79   08/28/24 (!) 141/86   04/01/24 (!) 145/94       No data recorded            Passed - Has GFR on file in past 12 months and most recent value is normal        Passed - Medication indicated for associated diagnosis     The medication is prescribed for one or more of the following conditions:    Chronic Kidney Disease (CDK)    Heart Failure (HF)    Diabetes, Nephropathy   Hypertension    Coronary Artery Disease (CAD)   Raynaud's Disease   Ischemic cardiomyopathy   Cardiomyopathy   Pulmonary Hypertension           Passed - Recent (12 mo) or future (90days) visit within the authorizing provider's specialty     The patient must have completed an in-person or virtual visit within the past 12 months or has a future visit scheduled within the next 90 days with the authorizing provider s specialty.  Urgent care and e-visits do not qualify as an office visit for this protocol.          Passed - Patient is age 18 or older        Passed - Normal serum potassium on file in past 12 months     Recent Labs   Lab Test 04/01/24  0752   POTASSIUM 4.1

## 2025-04-15 SDOH — HEALTH STABILITY: PHYSICAL HEALTH: ON AVERAGE, HOW MANY DAYS PER WEEK DO YOU ENGAGE IN MODERATE TO STRENUOUS EXERCISE (LIKE A BRISK WALK)?: 3 DAYS

## 2025-04-15 SDOH — HEALTH STABILITY: PHYSICAL HEALTH: ON AVERAGE, HOW MANY MINUTES DO YOU ENGAGE IN EXERCISE AT THIS LEVEL?: 40 MIN

## 2025-04-15 ASSESSMENT — SOCIAL DETERMINANTS OF HEALTH (SDOH): HOW OFTEN DO YOU GET TOGETHER WITH FRIENDS OR RELATIVES?: THREE TIMES A WEEK

## 2025-04-16 ENCOUNTER — OFFICE VISIT (OUTPATIENT)
Dept: INTERNAL MEDICINE | Facility: CLINIC | Age: 70
End: 2025-04-16
Payer: COMMERCIAL

## 2025-04-16 ENCOUNTER — LAB (OUTPATIENT)
Dept: LAB | Facility: CLINIC | Age: 70
End: 2025-04-16
Payer: COMMERCIAL

## 2025-04-16 VITALS
BODY MASS INDEX: 23.28 KG/M2 | OXYGEN SATURATION: 99 % | DIASTOLIC BLOOD PRESSURE: 88 MMHG | SYSTOLIC BLOOD PRESSURE: 138 MMHG | WEIGHT: 162.6 LBS | HEART RATE: 68 BPM | HEIGHT: 70 IN

## 2025-04-16 DIAGNOSIS — I10 HYPERTENSION, UNSPECIFIED TYPE: ICD-10-CM

## 2025-04-16 DIAGNOSIS — D64.9 ANEMIA, UNSPECIFIED TYPE: ICD-10-CM

## 2025-04-16 DIAGNOSIS — R73.02 IGT (IMPAIRED GLUCOSE TOLERANCE): ICD-10-CM

## 2025-04-16 DIAGNOSIS — D64.9 ANEMIA, UNSPECIFIED TYPE: Primary | ICD-10-CM

## 2025-04-16 DIAGNOSIS — L40.50 PSORIATIC ARTHRITIS (H): ICD-10-CM

## 2025-04-16 DIAGNOSIS — M75.41 IMPINGEMENT SYNDROME OF SHOULDER REGION, RIGHT: ICD-10-CM

## 2025-04-16 DIAGNOSIS — N52.9 ERECTILE DYSFUNCTION, UNSPECIFIED ERECTILE DYSFUNCTION TYPE: ICD-10-CM

## 2025-04-16 DIAGNOSIS — Z12.5 SCREENING FOR PROSTATE CANCER: ICD-10-CM

## 2025-04-16 DIAGNOSIS — Z12.5 SCREENING FOR PROSTATE CANCER: Primary | ICD-10-CM

## 2025-04-16 DIAGNOSIS — E78.5 HYPERLIPIDEMIA LDL GOAL <70: ICD-10-CM

## 2025-04-16 LAB
ALBUMIN SERPL BCG-MCNC: 4.5 G/DL (ref 3.5–5.2)
ALP SERPL-CCNC: 63 U/L (ref 40–150)
ALT SERPL W P-5'-P-CCNC: 14 U/L (ref 0–70)
ANION GAP SERPL CALCULATED.3IONS-SCNC: 9 MMOL/L (ref 7–15)
AST SERPL W P-5'-P-CCNC: 19 U/L (ref 0–45)
BASOPHILS # BLD AUTO: 0 10E3/UL (ref 0–0.2)
BASOPHILS NFR BLD AUTO: 0 %
BILIRUB SERPL-MCNC: 0.5 MG/DL
BUN SERPL-MCNC: 18.1 MG/DL (ref 8–23)
CALCIUM SERPL-MCNC: 9.3 MG/DL (ref 8.8–10.4)
CHLORIDE SERPL-SCNC: 106 MMOL/L (ref 98–107)
CHOLEST SERPL-MCNC: 148 MG/DL
CREAT SERPL-MCNC: 0.88 MG/DL (ref 0.67–1.17)
EGFRCR SERPLBLD CKD-EPI 2021: >90 ML/MIN/1.73M2
EOSINOPHIL # BLD AUTO: 0.1 10E3/UL (ref 0–0.7)
EOSINOPHIL NFR BLD AUTO: 1 %
ERYTHROCYTE [DISTWIDTH] IN BLOOD BY AUTOMATED COUNT: 12.7 % (ref 10–15)
EST. AVERAGE GLUCOSE BLD GHB EST-MCNC: 117 MG/DL
FASTING STATUS PATIENT QL REPORTED: YES
FASTING STATUS PATIENT QL REPORTED: YES
FERRITIN SERPL-MCNC: 47 NG/ML (ref 31–409)
GLUCOSE SERPL-MCNC: 106 MG/DL (ref 70–99)
HBA1C MFR BLD: 5.7 %
HCO3 SERPL-SCNC: 26 MMOL/L (ref 22–29)
HCT VFR BLD AUTO: 38.9 % (ref 40–53)
HDLC SERPL-MCNC: 63 MG/DL
HGB BLD-MCNC: 13 G/DL (ref 13.3–17.7)
IMM GRANULOCYTES # BLD: 0 10E3/UL
IMM GRANULOCYTES NFR BLD: 0 %
IRON BINDING CAPACITY (ROCHE): 331 UG/DL (ref 240–430)
IRON SATN MFR SERPL: 21 % (ref 15–46)
IRON SERPL-MCNC: 68 UG/DL (ref 61–157)
LDLC SERPL CALC-MCNC: 73 MG/DL
LYMPHOCYTES # BLD AUTO: 2.3 10E3/UL (ref 0.8–5.3)
LYMPHOCYTES NFR BLD AUTO: 33 %
MCH RBC QN AUTO: 29.6 PG (ref 26.5–33)
MCHC RBC AUTO-ENTMCNC: 33.4 G/DL (ref 31.5–36.5)
MCV RBC AUTO: 89 FL (ref 78–100)
MONOCYTES # BLD AUTO: 0.8 10E3/UL (ref 0–1.3)
MONOCYTES NFR BLD AUTO: 11 %
NEUTROPHILS # BLD AUTO: 3.8 10E3/UL (ref 1.6–8.3)
NEUTROPHILS NFR BLD AUTO: 54 %
NONHDLC SERPL-MCNC: 85 MG/DL
NRBC # BLD AUTO: 0 10E3/UL
NRBC BLD AUTO-RTO: 0 /100
PLATELET # BLD AUTO: 162 10E3/UL (ref 150–450)
POTASSIUM SERPL-SCNC: 4.3 MMOL/L (ref 3.4–5.3)
PROT SERPL-MCNC: 6.9 G/DL (ref 6.4–8.3)
PSA SERPL DL<=0.01 NG/ML-MCNC: 3.03 NG/ML (ref 0–6.5)
RBC # BLD AUTO: 4.39 10E6/UL (ref 4.4–5.9)
SODIUM SERPL-SCNC: 141 MMOL/L (ref 135–145)
TRIGL SERPL-MCNC: 58 MG/DL
WBC # BLD AUTO: 7 10E3/UL (ref 4–11)

## 2025-04-16 PROCEDURE — 3079F DIAST BP 80-89 MM HG: CPT | Performed by: INTERNAL MEDICINE

## 2025-04-16 PROCEDURE — 83036 HEMOGLOBIN GLYCOSYLATED A1C: CPT | Performed by: INTERNAL MEDICINE

## 2025-04-16 PROCEDURE — 3075F SYST BP GE 130 - 139MM HG: CPT | Performed by: INTERNAL MEDICINE

## 2025-04-16 PROCEDURE — 83540 ASSAY OF IRON: CPT | Performed by: PATHOLOGY

## 2025-04-16 PROCEDURE — 82728 ASSAY OF FERRITIN: CPT | Performed by: PATHOLOGY

## 2025-04-16 PROCEDURE — 99397 PER PM REEVAL EST PAT 65+ YR: CPT | Performed by: INTERNAL MEDICINE

## 2025-04-16 PROCEDURE — 99000 SPECIMEN HANDLING OFFICE-LAB: CPT | Performed by: PATHOLOGY

## 2025-04-16 PROCEDURE — 80053 COMPREHEN METABOLIC PANEL: CPT | Performed by: PATHOLOGY

## 2025-04-16 PROCEDURE — 80061 LIPID PANEL: CPT | Performed by: PATHOLOGY

## 2025-04-16 PROCEDURE — G0103 PSA SCREENING: HCPCS | Performed by: PATHOLOGY

## 2025-04-16 PROCEDURE — 36415 COLL VENOUS BLD VENIPUNCTURE: CPT | Performed by: PATHOLOGY

## 2025-04-16 PROCEDURE — 85025 COMPLETE CBC W/AUTO DIFF WBC: CPT | Performed by: PATHOLOGY

## 2025-04-16 PROCEDURE — 83550 IRON BINDING TEST: CPT | Performed by: PATHOLOGY

## 2025-04-16 RX ORDER — SILDENAFIL 100 MG/1
100 TABLET, FILM COATED ORAL DAILY PRN
Qty: 30 TABLET | Refills: 3 | Status: SHIPPED | OUTPATIENT
Start: 2025-04-16

## 2025-04-16 RX ORDER — ATORVASTATIN CALCIUM 10 MG/1
10 TABLET, FILM COATED ORAL DAILY
Qty: 90 TABLET | Refills: 3 | Status: SHIPPED | OUTPATIENT
Start: 2025-04-16

## 2025-04-16 NOTE — PROGRESS NOTES
Prince Pereira is a 69 year old male that presents in clinic today for the following:     Chief Complaint   Patient presents with    Medicare Visit     The patient's allergies and medications were reviewed. The patient's vitals were obtained without incident.     [place], [place]   Primary [facility] Clinic

## 2025-04-16 NOTE — PROGRESS NOTES
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  He has been doing reasonably well he is doing his exercises for the back and this is 50% better.  He has however developed some right shoulder impingement.  This occurred in part after he did an aggressive pull-up.  He has had trouble laying on the shoulder at night on that side.  He has had previous rotator cuff issues about 15 years ago which improved with exercise and with physical therapy.  He has not been doing anything for the shoulder.  He monitors his blood pressure at home its excellent generally averaging about 117/70 or so.  He is physically active predominantly walking for exercise has had to give up tennis in part because of the shoulder.  He uses alcohol only on weekends he is eating a healthy diet his sleep is somewhat bothered by the current political situation.  He has had some ongoing issues with erectile dysfunction and difficulty intermittently we discussed the use of sildenafil and he agreed.  Past Medical History:   Diagnosis Date    Atrial fibrillation (H)     paroxysmal with ablation 2014    Family history of prostate cancer     father  age 60    Herpes zoster 2013    Hypertension     Almendarez's neuroma of right foot     Psoriasis     psoriatic arthritis     Pulmonary nodules 2014    multiple noncalcified up to 6 mm needs f/u     Past Surgical History:   Procedure Laterality Date    ANESTHESIA CARDIOVERSION  10/6/2011    Procedure:ANESTHESIA CARDIOVERSION; CARDIOVERSION; Surgeon:GENERIC ANESTHESIA PROVIDER; Location:[facility]    ANESTHESIA CARDIOVERSION  2/10/2012    Procedure:ANESTHESIA CARDIOVERSION; CARDIOVERSION; Surgeon:GENERIC ANESTHESIA PROVIDER; Location:[facility]    ANESTHESIA CARDIOVERSION  2013    Procedure: ANESTHESIA CARDIOVERSION;  Cardioversion;  Surgeon: Provider, Generic Anesthesia;  Location: [facility] OR    ANESTHESIA CARDIOVERSION  2013    Procedure: ANESTHESIA CARDIOVERSION;   "Cardioversion;  Surgeon: Provider, Generic Anesthesia;  Location: [facility] OR    CATARACT IOL, RT/LT Left 2015    COLONOSCOPY      ENT SURGERY      tonsillectomy    HERNIORRHAPHY INGUINAL Right 7/15/2015    Procedure: HERNIORRHAPHY INGUINAL;  Surgeon: Bk Watts MD;  Location: [facility] OR    Left Atrial Wide Area Circumferential radiofrequency ablation  2014      for atrial fib    PHACOEMULSIFICATION CLEAR CORNEA WITH STANDARD INTRAOCULAR LENS IMPLANT Right 2015    Procedure: PHACOEMULSIFICATION CLEAR CORNEA WITH STANDARD INTRAOCULAR LENS IMPLANT;  Surgeon: Petr Moyer MD;  Location: [facility]     Family History   Problem Relation Age of Onset    C.A.D. Maternal Grandfather          age 63    Hypertension Maternal Grandfather     Hypertension Mother     Alzheimer Disease Mother     Prostate Cancer Father          age 60    Alzheimer Disease Maternal Grandmother     Depression Daughter     Anxiety Disorder Daughter     Glaucoma No family hx of     Macular Degeneration No family hx of          Exam:  /88 (BP Location: Right arm, Patient Position: Sitting, Cuff Size: Adult Regular)   Pulse 68   Ht 1.778 m (5' 10\")   Wt 73.8 kg (162 lb 9.6 oz)   SpO2 99%   BMI 23.33 kg/m    162 lbs 9.6 oz  Physical Exam   The patient is alert, oriented with a clear sensorium.   Skin shows no lesions or rashes and good turgor.   Head is normocephalic and atraumatic.   Ears show normal TMs bilaterally.   Mouth shows clear oral mucosa.   Neck shows no nodes, thyromegaly or bruits.   Back is non tender.  Lungs are clear to percussion and auscultation.   Heart shows normal S1 and S2 without murmur or gallop.   Abdomen is soft and nontender without masses or organomegaly.   Genitalia show normal testes. No evidence of inguinal hernia.  Extremities show no edema and no evidence of active synovitis.  Positive drop arm test on the right positive Neer impingement sign negative Anderson impingement sign " good strength in the supraspinatus internal and external rotation  Neurologic examination shows cranial nerves II-XII intact. Motor shows 5/5 strength. Reflexes are symmetric. Cerebellar testing shows normal tandem gait.  Romberg negative.    Labs reviewed:  Results for orders placed or performed in visit on 04/16/25   Lipid Profile     Status: None   Result Value Ref Range    Cholesterol 148 <200 mg/dL    Triglycerides 58 <150 mg/dL    Direct Measure HDL 63 >=40 mg/dL    LDL Cholesterol Calculated 73 <100 mg/dL    Non HDL Cholesterol 85 <130 mg/dL    Patient Fasting > 8hrs? Yes     Narrative    Cholesterol  Desirable: < 200 mg/dL  Borderline High: 200 - 239 mg/dL  High: >= 240 mg/dL    Triglycerides  Normal: < 150 mg/dL  Borderline High: 150 - 199 mg/dL  High: 200-499 mg/dL  Very High: >= 500 mg/dL    Direct Measure HDL  Female: >= 50 mg/dL   Male: >= 40 mg/dL    LDL Cholesterol  Desirable: < 100 mg/dL  Above Desirable: 100 - 129 mg/dL   Borderline High: 130 - 159 mg/dL   High:  160 - 189 mg/dL   Very High: >= 190 mg/dL    Non HDL Cholesterol  Desirable: < 130 mg/dL  Above Desirable: 130 - 159 mg/dL  Borderline High: 160 - 189 mg/dL  High: 190 - 219 mg/dL  Very High: >= 220 mg/dL   Hemoglobin A1c     Status: Abnormal   Result Value Ref Range    Estimated Average Glucose 117 (H) <117 mg/dL    Hemoglobin A1C 5.7 (H) <5.7 %   Comprehensive metabolic panel     Status: Abnormal   Result Value Ref Range    Sodium 141 135 - 145 mmol/L    Potassium 4.3 3.4 - 5.3 mmol/L    Carbon Dioxide (CO2) 26 22 - 29 mmol/L    Anion Gap 9 7 - 15 mmol/L    Urea Nitrogen 18.1 8.0 - 23.0 mg/dL    Creatinine 0.88 0.67 - 1.17 mg/dL    GFR Estimate >90 >60 mL/min/1.73m2    Calcium 9.3 8.8 - 10.4 mg/dL    Chloride 106 98 - 107 mmol/L    Glucose 106 (H) 70 - 99 mg/dL    Alkaline Phosphatase 63 40 - 150 U/L    AST 19 0 - 45 U/L    ALT 14 0 - 70 U/L    Protein Total 6.9 6.4 - 8.3 g/dL    Albumin 4.5 3.5 - 5.2 g/dL    Bilirubin Total 0.5 <=1.2  mg/dL    Patient Fasting > 8hrs? Yes    PSA, screen     Status: Normal   Result Value Ref Range    Prostate Specific Antigen Screen 3.03 0.00 - 6.50 ng/mL    Narrative    This result is obtained using the Roche Elecsys total PSA method on the kirit e411 immunoassay analyzer, which is an ultrasensitive method. Results obtained with different assay methods or kits cannot be used interchangeably.  This test is intended for initial prostate cancer screening. PSA values exceeding the age-specific limits are suspicious for prostate disease, but additional testing, such as prostate biopsy, is needed to diagnose prostate pathology. The American Cancer Society recommends annual examination with digital rectal examination and serum PSA beginning at age 50 and for men with a life expectancy of at least 10 years after detection of prostate cancer. For men in high-risk groups, such as  Americans or men with a first-degree relative diagnosed at a younger age, testing should begin at a younger age. It is generally recommended that information be provided to patients about the benefits and limitations of testing and treatment so they can make informed decisions.   CBC with platelets and differential     Status: Abnormal   Result Value Ref Range    WBC Count 7.0 4.0 - 11.0 10e3/uL    RBC Count 4.39 (L) 4.40 - 5.90 10e6/uL    Hemoglobin 13.0 (L) 13.3 - 17.7 g/dL    Hematocrit 38.9 (L) 40.0 - 53.0 %    MCV 89 78 - 100 fL    MCH 29.6 26.5 - 33.0 pg    MCHC 33.4 31.5 - 36.5 g/dL    RDW 12.7 10.0 - 15.0 %    Platelet Count 162 150 - 450 10e3/uL    % Neutrophils 54 %    % Lymphocytes 33 %    % Monocytes 11 %    % Eosinophils 1 %    % Basophils 0 %    % Immature Granulocytes 0 %    NRBCs per 100 WBC 0 <1 /100    Absolute Neutrophils 3.8 1.6 - 8.3 10e3/uL    Absolute Lymphocytes 2.3 0.8 - 5.3 10e3/uL    Absolute Monocytes 0.8 0.0 - 1.3 10e3/uL    Absolute Eosinophils 0.1 0.0 - 0.7 10e3/uL    Absolute Basophils 0.0 0.0 - 0.2 10e3/uL     Absolute Immature Granulocytes 0.0 <=0.4 10e3/uL    Absolute NRBCs 0.0 10e3/uL   CBC with Platelets & Differential     Status: Abnormal    Narrative    The following orders were created for panel order CBC with Platelets & Differential.  Procedure                               Abnormality         Status                     ---------                               -----------         ------                     CBC with platelets and ...[[id number]]  Abnormal            Final result                 Please view results for these tests on the individual orders.       ASSESSMENT  1 R shoulder impingement  2 Hypertension well-controlled at home  3 Hyperlipidemia on atorvastatin  4 Psoriatic arthritis on Humira  5 Family history prostate cancer  6 History AF S/P ablation  7 BPH  8 IGT  9 Lumbar degenerative arthritis improved  10 Dilated ascending aorta followed by cardiology  11 erectile dysfunction    Plan  Will reassess his labs today he will get his COVID booster and his Tdap through the pharmacy.  Will refer to physical therapy for the shoulder.  Refilled his atorvastatin and will start sildenafil    This note was completed using Dragon voice recognition software.      Brendan Ndiaye MD  General Internal Medicine  [facility]  [phone]

## 2025-05-28 ENCOUNTER — VIRTUAL VISIT (OUTPATIENT)
Dept: URGENT CARE | Facility: CLINIC | Age: 70
End: 2025-05-28
Payer: COMMERCIAL

## 2025-05-28 DIAGNOSIS — B02.9 HERPES ZOSTER WITHOUT COMPLICATION: Primary | ICD-10-CM

## 2025-05-28 PROCEDURE — 98005 SYNCH AUDIO-VIDEO EST LOW 20: CPT

## 2025-05-28 RX ORDER — VALACYCLOVIR HYDROCHLORIDE 1 G/1
1000 TABLET, FILM COATED ORAL 3 TIMES DAILY
Qty: 21 TABLET | Refills: 0 | Status: SHIPPED | OUTPATIENT
Start: 2025-05-28 | End: 2025-06-04

## 2025-05-28 NOTE — PROGRESS NOTES
Assessment & Plan       ICD-10-CM    1. Herpes zoster without complication  B02.9 valACYclovir (VALTREX) 1000 mg tablet           Assessment & Plan     Herpes zoster without complication  - Likely recurrence of shingles, possibly due to reduced immune response from immunosuppressive medication.   - Prescribe valacyclovir, 3 times daily for 7 days. Start treatment immediately. Follow up with primary care provider during next annual physical to discuss potential need for another Shingrix injection or other preventive measures. Seek care if symptoms worsen during upcoming trip.         No follow-ups on file.    At the end of the encounter, I discussed results, diagnosis, medications. Discussed red flags for immediate return to clinic/ER, as well as indications for follow up if no improvement. Patient understood and agreed to plan. Patient was stable for discharge.    Subjective     Kody is a 69 year old male who presents to clinic today the following health issues:    History of Present Illness-  Kody Pereira, age 69, male  - shingles-like rash on the lower back at the beltline, similar to previous shingles episode as well as to bilateral thighs.   - Rash is not associated with significant pain, though did feel almost bruised like pain before the rash appeared.   - Currently on immunosuppressive medication, which may have reduced response to Shingrix vaccine  - No issues with valacyclovir during previous treatment for shingles     ROS: Pertinent ROS neg other than the symptoms noted above in the HPI.     Problem List:  2021-10: Lipoma  2021-10: External hemorrhoids  2021-10: Atypical nevus  2021-05: Hypertension  2021-01: Hypercholesteremia  2014-02: S/P ablation of atrial fibrillation  2014-02: Pulmonary nodules  2012-06: Psoriatic arthritis (H)  2010-03: Esophageal reflux  2007-11: Benign prostatic hyperplasia  1987-01: Atrial fibrillation (H)  Herpes zoster      Past Medical History:   Diagnosis Date     Atrial fibrillation (H)     paroxysmal with ablation 2014    Family history of prostate cancer     father  age 60    Herpes zoster 2013    Hypertension     Almendarez's neuroma of right foot     Psoriasis     psoriatic arthritis     Pulmonary nodules 2014    multiple noncalcified up to 6 mm needs f/u       Social History     Tobacco Use    Smoking status: Former     Current packs/day: 0.00     Average packs/day: 0.5 packs/day for 1 year (0.5 ttl pk-yrs)     Types: Cigarettes     Start date: 1979     Quit date: 1980     Years since quittin.4    Smokeless tobacco: Never   Substance Use Topics    Alcohol use: Yes     Alcohol/week: 8.3 standard drinks of alcohol     Types: 10 Standard drinks or equivalent per week     Comment: occasional               OBJECTIVE:  Vitals not done due to this being a virtual visit    GENERAL: healthy, alert and no distress  EYES: Eyes grossly normal to inspection,conjunctivae and sclerae normal  RESP: Able to speak in complete sentences, no audible wheeze or cough  SKIN: see uploaded images. Raised erythematous patches noted to thigh   NEURO: mentation intact and speech normal  PSYCH: mentation appears normal, affect normal/bright    Video-Visit Details    Type of service:  Video Visit  Video Start Time: 1228  Video End Time: 1233    Originating Location: Home    Distant Location:  [facility]     Platform used for Video Visit: ARASH Andrews CNP

## 2025-06-09 ASSESSMENT — ACTIVITIES OF DAILY LIVING (ADL)
PUTTING_ON_AN_UNDERSHIRT_OR_A_PULLOVER_SWEATER: 1
REMOVING_SOMETHING_FROM_YOUR_BACK_POCKET: 1
TOUCHING_THE_BACK_OF_YOUR_NECK: 4
AT_ITS_WORST?: 4
PUTTING_ON_A_SHIRT_THAT_BUTTONS_DOWN_THE_FRONT: 0
PLACING_AN_OBJECT_ON_A_HIGH_SHELF: 2
PLEASE_INDICATE_YOR_PRIMARY_REASON_FOR_REFERRAL_TO_THERAPY:: SHOULDER
WASHING_YOUR_HAIR?: 0
PUSHING_WITH_THE_INVOLVED_ARM: 0
WHEN_LYING_ON_THE_INVOLVED_SIDE: 2
PUTTING_ON_YOUR_PANTS: 0
WASHING_YOUR_BACK: 2
CARRYING_A_HEAVY_OBJECT_OF_10_POUNDS: 0
REACHING_FOR_SOMETHING_ON_A_HIGH_SHELF: 2

## 2025-06-10 ENCOUNTER — THERAPY VISIT (OUTPATIENT)
Dept: PHYSICAL THERAPY | Facility: CLINIC | Age: 70
End: 2025-06-10
Payer: COMMERCIAL

## 2025-06-10 DIAGNOSIS — M75.41 IMPINGEMENT SYNDROME OF SHOULDER REGION, RIGHT: ICD-10-CM

## 2025-06-10 PROCEDURE — 97110 THERAPEUTIC EXERCISES: CPT | Mod: GP | Performed by: PHYSICAL THERAPIST

## 2025-06-10 PROCEDURE — 97161 PT EVAL LOW COMPLEX 20 MIN: CPT | Mod: GP | Performed by: PHYSICAL THERAPIST

## 2025-06-10 NOTE — PROGRESS NOTES
PHYSICAL THERAPY EVALUATION  Type of Visit: Evaluation       Fall Risk Screen:  Have you fallen and had an injury in the past year?: No      Subjective         Presenting condition or subjective complaint: right shoulder pain, improving over the past 4-6 weeks. most noticeable reaching behind, throwing overhand. Pt reports an onset of R shoulder pain after doing a pull up this past winter.   Date of onset: 04/16/25    Relevant medical history: Arthritis; Heart problems; High blood pressure   Dates & types of surgery: Hernia repair circa 2014. Ablation for a fib circa 2015    Prior diagnostic imaging/testing results:       Prior therapy history for the same diagnosis, illness or injury: Yes Saw PT for 1-2 visits over a decade ago, had some home exercises        Living Environment  Social support: With a significant other or spouse   Type of home: House   Stairs to enter the home: No       Ramp: No   Stairs inside the home: Yes 2 Is there a railing: Yes     Help at home: None  Equipment owned:       Employment: No    Hobbies/Interests: kayaking, fishing, tennis, home repair (own and adult children)    Patient goals for therapy: reaching, extending to the back    Pain assessment: See objective evaluation for additional pain details     Objective   SHOULDER EVALUATION  PAIN: Pain Level at Rest: 2/10  Pain Level with Use: 7/10  Pain Location: R lateral shoulder  Pain Quality: Aching  Pain Frequency: intermittent  Pain is Worst: nighttime  Pain is Exacerbated By: lying on involved side, reaching behind back, throwing  Pain is Relieved By: rest  Pain Progression: Improved  INTEGUMENTARY (edema, incisions):   POSTURE: Sitting Posture: Rounded shoulders  GAIT:   Weightbearing Status:   Assistive Device(s):   Gait Deviations:   BALANCE/PROPRIOCEPTION:   WEIGHTBEARING ALIGNMENT:   ROM:   (Degrees) Left AROM Left PROM Right AROM  Right PROM   Shoulder Flexion 170  165, pain +    Shoulder Extension       Shoulder Abduction 170   165, pain + (shoulder hike)    Shoulder Adduction       Shoulder Internal Rotation Thumb to T8  Thumb to L1, pain +    Shoulder External Rotation WNL  WNL    Shoulder Horizontal Abduction       Shoulder Horizontal Adduction       Shoulder Flexion ER       Shoulder Flexion IR       Elbow Extension       Elbow Flexion       Pain:   End feel:     STRENGTH:   Pain: - none + mild ++ moderate +++ severe  Strength Scale: 0-5/5 Left Right   Shoulder Flexion 5 5   Shoulder Extension     Shoulder Abduction 5 5-, + (mild)   Shoulder Adduction     Shoulder Internal Rotation 5 5   Shoulder External Rotation 5- 5-   Shoulder Horizontal Abduction     Shoulder Horizontal Adduction     Elbow Flexion     Elbow Extension     Mid Trap     Lower Trap     Rhomboid     Serratus Anterior       FLEXIBILITY:   SPECIAL TESTS: Positive Neer's; Positive Hawk Mandeep; Negative Drop Arm; Negative ER lag  PALPATION: TTP at infraspinatus  JOINT MOBILITY: Hypomobile AC jt  CERVICAL SCREEN:     Assessment & Plan   CLINICAL IMPRESSIONS  Medical Diagnosis: R shoulder pain    Treatment Diagnosis: R shoulder pain   Impression/Assessment: Patient is a 69 year old male with R shoulder complaints.  The following significant findings have been identified: Pain, Decreased ROM/flexibility, Decreased joint mobility, Decreased strength, Impaired muscle performance, and Impaired posture. These impairments interfere with their ability to perform self care tasks, recreational activities, and household chores as compared to previous level of function.     Clinical Decision Making (Complexity):  Clinical Presentation: Stable/Uncomplicated  Clinical Presentation Rationale: based on medical and personal factors listed in PT evaluation  Clinical Decision Making (Complexity): Low complexity    PLAN OF CARE  Treatment Interventions:  Interventions: Manual Therapy, Neuromuscular Re-education, Therapeutic Activity, Therapeutic Exercise    Long Term Goals     PT Goal 1  Goal  Identifier: Reaching  Goal Description: Pt will be able to reach behind back fully w/o increased pain  Rationale: to maximize safety and independence with self cares  Goal Progress: pain 4/10  Target Date: 09/02/25      Frequency of Treatment: 1x week  Duration of Treatment: 4 weeks then 2 x month for 8 weeks    Recommended Referrals to Other Professionals:   Education Assessment:   Learner/Method: Patient;No Barriers to Learning    Risks and benefits of evaluation/treatment have been explained.   Patient/Family/caregiver agrees with Plan of Care.     Evaluation Time:     PT Eval, Low Complexity Minutes (79813): 25       Signing Clinician: Justin Le PT        [facility]                                                                                   OUTPATIENT PHYSICAL THERAPY      PLAN OF TREATMENT FOR OUTPATIENT REHABILITATION   Patient's Last Name, First Name, Prince Walker YOB: 1955   Provider's Name   [facility]   Medical Record No.  [id number]     Onset Date: 04/16/25  Start of Care Date: 06/10/25     Medical Diagnosis:  R shoulder pain      PT Treatment Diagnosis:  R shoulder pain Plan of Treatment  Frequency/Duration: 1x week/ 4 weeks then 2 x month for 8 weeks    Certification date from 06/10/25 to 09/02/25         See note for plan of treatment details and functional goals     Justin Le, PT                         I CERTIFY THE NEED FOR THESE SERVICES FURNISHED UNDER        THIS PLAN OF TREATMENT AND WHILE UNDER MY CARE     (Physician attestation of this document indicates review and certification of the therapy plan).              Referring Provider:  Brendan Ndiaye    Initial Assessment  See Epic Evaluation- Start of Care Date: 06/10/25

## 2025-06-15 ENCOUNTER — HEALTH MAINTENANCE LETTER (OUTPATIENT)
Age: 70
End: 2025-06-15

## 2025-08-10 DIAGNOSIS — I48.0 PAROXYSMAL ATRIAL FIBRILLATION (H): ICD-10-CM

## 2025-08-18 ENCOUNTER — HOSPITAL ENCOUNTER (OUTPATIENT)
Dept: CARDIOLOGY | Facility: CLINIC | Age: 70
Discharge: HOME | End: 2025-08-18
Attending: NURSE PRACTITIONER | Admitting: NURSE PRACTITIONER
Payer: COMMERCIAL

## 2025-08-18 DIAGNOSIS — I48.0 PAROXYSMAL ATRIAL FIBRILLATION (H): ICD-10-CM

## 2025-08-18 LAB — LVEF ECHO: NORMAL

## 2025-08-18 PROCEDURE — 93306 TTE W/DOPPLER COMPLETE: CPT | Mod: 26 | Performed by: INTERNAL MEDICINE

## 2025-08-18 PROCEDURE — 93306 TTE W/DOPPLER COMPLETE: CPT

## 2025-08-19 ENCOUNTER — OFFICE VISIT (OUTPATIENT)
Dept: CARDIOLOGY | Facility: CLINIC | Age: 70
End: 2025-08-19
Payer: COMMERCIAL

## 2025-08-19 VITALS
DIASTOLIC BLOOD PRESSURE: 88 MMHG | OXYGEN SATURATION: 100 % | HEIGHT: 70 IN | HEART RATE: 74 BPM | WEIGHT: 159 LBS | BODY MASS INDEX: 22.76 KG/M2 | SYSTOLIC BLOOD PRESSURE: 138 MMHG

## 2025-08-19 DIAGNOSIS — I48.0 PAROXYSMAL ATRIAL FIBRILLATION (H): ICD-10-CM

## 2025-08-19 PROCEDURE — 99204 OFFICE O/P NEW MOD 45 MIN: CPT | Performed by: INTERNAL MEDICINE

## 2025-08-19 PROCEDURE — 3079F DIAST BP 80-89 MM HG: CPT | Performed by: INTERNAL MEDICINE

## 2025-08-19 PROCEDURE — 3075F SYST BP GE 130 - 139MM HG: CPT | Performed by: INTERNAL MEDICINE

## 2025-09-02 ENCOUNTER — MYC MEDICAL ADVICE (OUTPATIENT)
Dept: CARDIOLOGY | Facility: CLINIC | Age: 70
End: 2025-09-02
Payer: COMMERCIAL

## 2025-09-02 DIAGNOSIS — I48.0 PAROXYSMAL ATRIAL FIBRILLATION (H): ICD-10-CM

## (undated) RX ORDER — METOPROLOL TARTRATE 50 MG
TABLET ORAL
Status: DISPENSED
Start: 2022-04-04

## (undated) RX ORDER — METOPROLOL TARTRATE 1 MG/ML
INJECTION, SOLUTION INTRAVENOUS
Status: DISPENSED
Start: 2022-04-04

## (undated) RX ORDER — NITROGLYCERIN 0.4 MG/1
TABLET SUBLINGUAL
Status: DISPENSED
Start: 2022-04-04

## (undated) RX ORDER — IVABRADINE 5 MG/1
TABLET, FILM COATED ORAL
Status: DISPENSED
Start: 2022-04-04